# Patient Record
Sex: FEMALE | Race: WHITE | Employment: OTHER | URBAN - METROPOLITAN AREA
[De-identification: names, ages, dates, MRNs, and addresses within clinical notes are randomized per-mention and may not be internally consistent; named-entity substitution may affect disease eponyms.]

---

## 2017-05-10 ENCOUNTER — OFFICE VISIT (OUTPATIENT)
Dept: FAMILY MEDICINE CLINIC | Facility: CLINIC | Age: 77
End: 2017-05-10

## 2017-05-10 VITALS
OXYGEN SATURATION: 98 % | WEIGHT: 200 LBS | SYSTOLIC BLOOD PRESSURE: 138 MMHG | HEART RATE: 92 BPM | RESPIRATION RATE: 20 BRPM | TEMPERATURE: 98 F | DIASTOLIC BLOOD PRESSURE: 78 MMHG | BODY MASS INDEX: 33.32 KG/M2 | HEIGHT: 65 IN

## 2017-05-10 DIAGNOSIS — J02.9 PHARYNGITIS, UNSPECIFIED ETIOLOGY: Primary | ICD-10-CM

## 2017-05-10 PROCEDURE — 87880 STREP A ASSAY W/OPTIC: CPT | Performed by: NURSE PRACTITIONER

## 2017-05-10 PROCEDURE — 99213 OFFICE O/P EST LOW 20 MIN: CPT | Performed by: NURSE PRACTITIONER

## 2017-05-10 NOTE — PATIENT INSTRUCTIONS
Comfort measures explained and discussed:   · OTC Tylenol/ibuprofen as needed. · Push fluids- warm or cool liquids, whichever is soothing for patient. · Avoid caffeine. · Do not share utensils or drinks with anyone. · Good handwashing.    · Get ple

## 2017-05-10 NOTE — PROGRESS NOTES
CHIEF COMPLAINT:   Patient presents with:  Sore Throat        HPI:   Pancho Venegas is a 77year old female presents to clinic with complaint of sore throat. Patient has had for 1 days. Symptoms have been waxing and waning since onset.   Patient reports fo normocephalic  EYES: conjunctiva clear, EOM intact  EARS: TM's clear, non-injected, no bulging, retraction, or fluid bilaterally  NOSE: nostrils patent, scant nasal discharge, nasal mucosa pink  THROAT: oral mucosa pink, moist. Posterior pharynx erythemato

## 2017-05-15 RX ORDER — LISINOPRIL AND HYDROCHLOROTHIAZIDE 25; 20 MG/1; MG/1
TABLET ORAL
Qty: 90 TABLET | Refills: 0 | Status: SHIPPED | OUTPATIENT
Start: 2017-05-15 | End: 2017-07-06

## 2017-05-15 NOTE — TELEPHONE ENCOUNTER
Hypertensive Medications  Protocol Criteria:  · Appointment scheduled in the past 6 months or in the next 3 months  · BMP or CMP in the past 12 months  · Creatinine result < 2  Recent Visits       Provider Department Primary Dx    4 months ago Henok Laureano

## 2017-07-06 ENCOUNTER — OFFICE VISIT (OUTPATIENT)
Dept: INTERNAL MEDICINE CLINIC | Facility: CLINIC | Age: 77
End: 2017-07-06

## 2017-07-06 VITALS
HEART RATE: 78 BPM | HEIGHT: 64.5 IN | SYSTOLIC BLOOD PRESSURE: 117 MMHG | BODY MASS INDEX: 35.03 KG/M2 | TEMPERATURE: 98 F | WEIGHT: 207.69 LBS | DIASTOLIC BLOOD PRESSURE: 76 MMHG | RESPIRATION RATE: 16 BRPM

## 2017-07-06 DIAGNOSIS — Z78.0 POSTMENOPAUSAL: ICD-10-CM

## 2017-07-06 DIAGNOSIS — Z23 NEED FOR VACCINATION: ICD-10-CM

## 2017-07-06 DIAGNOSIS — Z01.419 ENCOUNTER FOR GYNECOLOGICAL EXAMINATION WITHOUT ABNORMAL FINDING: ICD-10-CM

## 2017-07-06 DIAGNOSIS — Z86.010 HISTORY OF COLON POLYPS: ICD-10-CM

## 2017-07-06 DIAGNOSIS — C50.912 MALIGNANT NEOPLASM OF LEFT FEMALE BREAST, UNSPECIFIED ESTROGEN RECEPTOR STATUS, UNSPECIFIED SITE OF BREAST (HCC): ICD-10-CM

## 2017-07-06 DIAGNOSIS — I10 ESSENTIAL HYPERTENSION: Primary | ICD-10-CM

## 2017-07-06 DIAGNOSIS — Z80.0 FAMILY HISTORY OF COLON CANCER: ICD-10-CM

## 2017-07-06 DIAGNOSIS — N95.1 MENOPAUSAL STATE: ICD-10-CM

## 2017-07-06 DIAGNOSIS — Z00.00 ENCOUNTER FOR ANNUAL HEALTH EXAMINATION: ICD-10-CM

## 2017-07-06 DIAGNOSIS — Z00.00 MEDICARE ANNUAL WELLNESS VISIT, INITIAL: ICD-10-CM

## 2017-07-06 PROCEDURE — 90670 PCV13 VACCINE IM: CPT | Performed by: INTERNAL MEDICINE

## 2017-07-06 PROCEDURE — G0009 ADMIN PNEUMOCOCCAL VACCINE: HCPCS | Performed by: INTERNAL MEDICINE

## 2017-07-06 PROCEDURE — G0101 CA SCREEN;PELVIC/BREAST EXAM: HCPCS | Performed by: INTERNAL MEDICINE

## 2017-07-06 PROCEDURE — G0438 PPPS, INITIAL VISIT: HCPCS | Performed by: INTERNAL MEDICINE

## 2017-07-06 RX ORDER — LISINOPRIL AND HYDROCHLOROTHIAZIDE 25; 20 MG/1; MG/1
1 TABLET ORAL
Qty: 90 TABLET | Refills: 1 | Status: SHIPPED | OUTPATIENT
Start: 2017-07-06 | End: 2018-03-01

## 2017-07-06 NOTE — PATIENT INSTRUCTIONS
Problem List Items Addressed This Visit        Unprioritized    Breast cancer (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 PT 1 cN0 M0.   She was treated with lumpectomy, sentinel node resection, rad stable and well controlled. No chest pain, palpitations, shortness of breath or lower extremity edema. Recheck labs have been ordered.          Relevant Medications    Lisinopril-Hydrochlorothiazide 20-25 MG Oral Tab    Other Relevant Orders    CBC WITH D Postmenopausal        Relevant Orders    XR DEXA BONE DENSITOMETRY (CPT=77080)    XR DEXA BONE DENSITOMETRY (CPT=77080)    Need for vaccination        Relevant Orders    PNEUMOCOCCAL VACC, 13 JI IM         Chandrika Strange's SCREENING SCHEDULE   Tests on thi found for this or any previous visit.  Limited to patients who meet one of the following criteria:   • Men who are 73-68 years old and have smoked more than 100 cigarettes in their lifetime   • Anyone with a family history    Colorectal Cancer Screening  Co Please get this Mammogram regularly   Immunizations      Influenza  Covered Annually No orders found for this or any previous visit.  Please get every year    Pneumococcal 13 (Prevnar)  Covered Once after 65   Orders placed or performed in visit on 07/06/17 Directives.

## 2017-07-06 NOTE — ASSESSMENT & PLAN NOTE
Family history of colon cancer– personal history of multiple colon polyps. Last colonoscopy done in 2013. Due for a follow-up evaluation–advised to follow-up with gastroenterology by November 2017.   Colonoscopy,5 Years due on 11/07/2017

## 2017-07-06 NOTE — ASSESSMENT & PLAN NOTE
Normal exam.  Labs as ordered. Skin check with multiple nevi–referral to dermatology provided. Vision and hearing seem normal at this time. Patient is status post bilateral IOL–done per [facility].   Yearly eye exams have been normal thus far wit

## 2017-07-06 NOTE — ASSESSMENT & PLAN NOTE
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 PT 1 cN0 M0. She was treated with lumpectomy, sentinel node resection, radiation therapy and hormonal therapy for 5 years.   She did have a stress test done in 201

## 2017-07-06 NOTE — ASSESSMENT & PLAN NOTE
Last cta in 2015 as follows  CONCLUSION:     #1 mild partially calcified plaque stenosis in the mid right coronary  artery of less than 30%     #2 the left anterior descending artery tapers distally but revealed no  significant stenosis     #3 moderate con

## 2017-07-06 NOTE — ASSESSMENT & PLAN NOTE
Patient is being monitored per colonoscopies. Currently asymptomatic. Recheck labs have been ordered.

## 2017-07-06 NOTE — PROGRESS NOTES
HPI:   French Goode is a 69year old female who presents for a Medicare Initial Annual Wellness visit (Once after 12 month Medicare anniversary) .       Her last annual assessment has been over 1 year: Annual Physical due on 05/26/1942         Patient Ca Unspecified essential hypertension. She  has a past surgical history that includes other surgical history; other surgical history; other surgical history; lumpectomy left (); radiation left (); and .     Her family history includes Breast Can strain to understand conversations:  No   I have to worry about missing the telephone ring or doorbell:  No I have trouble hearing conversations in a noisy background such as a crowded room or restaurant:  No   I get confused about where sounds come from: gallop   Abdomen:   Soft, non-tender, bowel sounds active all four quadrants,  no masses, no organomegaly   Pelvic: Deferred   Extremities: Extremities normal, atraumatic, no cyanosis or edema   Pulses: 2+ and symmetric   Skin: Skin color, texture, turgor EXAM    Postmenopausal  -     XR DEXA BONE DENSITOMETRY (CPT=77080); Future  -     XR DEXA BONE DENSITOMETRY (CPT=77080);  Future    Need for vaccination  -     PNEUMOCOCCAL VACC, 13 JI IM    Menopausal state Medicare annual wellness visit, initial    O aortic root.     Dictated by (CST):  Lb Becerra MD on 4/09/2015 at 11:41    Currently on lisinopril hydrochlorothiazide 20/25 1 tablet once daily. Blood pressure 117/76, pulse 78, temperature 97.9 °F (36.6 °C), temperature source Oral, resp.  rate masses. Colonoscopy,5 Years due on 11/07/2017    Immunization History   Administered Date(s) Administered   • TDAP 01/01/2010   • Zoster (Shingles) 08/01/2015     Prevnar 13 provided today. Menopausal state     DEXA scan has been ordered. Functional Ability     Bathing or Showering: Able without help    Toileting: Able without help    Dressing: Able without help    Eating: Able without help    Driving: Able without help    Preparing your meals: Able without help    Managing money/bills: ENTRY)    Recall \"Tree\": Correct (LATE ENTRY)       This section provided for quick review of chart, separate sheet to patient  [address] Internal Lab or Procedure External Lab or Procedure   Diabetes Screening 15 (Prevnar)  Covered Once after 65 No vaccine history found Please get once after your 65th birthday    Pneumococcal 23 (Pneumovax)  Covered Once after 65 No vaccine history found Please get once after your 65th birthday    Hepatitis B for Moderate/High R Spirometry Testing Annually Spirometry date:  No flowsheet data found.          Template: [facility] MEDICARE ANNUAL ASSESSMENT FEMALE [94581]

## 2017-07-07 ENCOUNTER — HOSPITAL ENCOUNTER (OUTPATIENT)
Dept: BONE DENSITY | Facility: HOSPITAL | Age: 77
Discharge: HOME | End: 2017-07-07
Attending: INTERNAL MEDICINE
Payer: MEDICARE

## 2017-07-07 ENCOUNTER — LAB ENCOUNTER (OUTPATIENT)
Dept: LAB | Facility: HOSPITAL | Age: 77
End: 2017-07-07
Attending: INTERNAL MEDICINE
Payer: MEDICARE

## 2017-07-07 DIAGNOSIS — Z78.0 POSTMENOPAUSAL: ICD-10-CM

## 2017-07-07 DIAGNOSIS — I10 ESSENTIAL HYPERTENSION: ICD-10-CM

## 2017-07-07 LAB
ALBUMIN SERPL BCP-MCNC: 3.7 G/DL (ref 3.5–4.8)
ALBUMIN/GLOB SERPL: 1.3 {RATIO} (ref 1–2)
ALP SERPL-CCNC: 60 U/L (ref 32–100)
ALT SERPL-CCNC: 18 U/L (ref 14–54)
ANION GAP SERPL CALC-SCNC: 9 MMOL/L (ref 0–18)
AST SERPL-CCNC: 18 U/L (ref 15–41)
BASOPHILS # BLD: 0 K/UL (ref 0–0.2)
BASOPHILS NFR BLD: 1 %
BILIRUB SERPL-MCNC: 0.8 MG/DL (ref 0.3–1.2)
BILIRUB UR QL: NEGATIVE
BUN SERPL-MCNC: 19 MG/DL (ref 8–20)
BUN/CREAT SERPL: 20.7 (ref 10–20)
CALCIUM SERPL-MCNC: 9.1 MG/DL (ref 8.5–10.5)
CHLORIDE SERPL-SCNC: 101 MMOL/L (ref 95–110)
CHOLEST SERPL-MCNC: 229 MG/DL (ref 110–200)
CO2 SERPL-SCNC: 29 MMOL/L (ref 22–32)
COLOR UR: YELLOW
CREAT SERPL-MCNC: 0.92 MG/DL (ref 0.5–1.5)
CREAT UR-MCNC: 146.9 MG/DL
EOSINOPHIL # BLD: 0.1 K/UL (ref 0–0.7)
EOSINOPHIL NFR BLD: 2 %
ERYTHROCYTE [DISTWIDTH] IN BLOOD BY AUTOMATED COUNT: 13.4 % (ref 11–15)
GLOBULIN PLAS-MCNC: 2.8 G/DL (ref 2.5–3.7)
GLUCOSE SERPL-MCNC: 84 MG/DL (ref 70–99)
GLUCOSE UR-MCNC: NEGATIVE MG/DL
HCT VFR BLD AUTO: 40.2 % (ref 35–48)
HDLC SERPL-MCNC: 50 MG/DL
HGB BLD-MCNC: 13.4 G/DL (ref 12–16)
HYALINE CASTS #/AREA URNS AUTO: 1 /LPF
KETONES UR-MCNC: NEGATIVE MG/DL
LDLC SERPL CALC-MCNC: 148 MG/DL (ref 0–99)
LYMPHOCYTES # BLD: 1.1 K/UL (ref 1–4)
LYMPHOCYTES NFR BLD: 15 %
MCH RBC QN AUTO: 30 PG (ref 27–32)
MCHC RBC AUTO-ENTMCNC: 33.4 G/DL (ref 32–37)
MCV RBC AUTO: 89.8 FL (ref 80–100)
MICROALBUMIN UR-MCNC: 0 MG/DL (ref 0–1.8)
MICROALBUMIN/CREAT UR: 0 MG/G{CREAT} (ref 0–20)
MONOCYTES # BLD: 0.5 K/UL (ref 0–1)
MONOCYTES NFR BLD: 6 %
NEUTROPHILS # BLD AUTO: 6 K/UL (ref 1.8–7.7)
NEUTROPHILS NFR BLD: 77 %
NITRITE UR QL STRIP.AUTO: NEGATIVE
NONHDLC SERPL-MCNC: 179 MG/DL
OSMOLALITY UR CALC.SUM OF ELEC: 289 MOSM/KG (ref 275–295)
PH UR: 7 [PH] (ref 5–8)
PLATELET # BLD AUTO: 231 K/UL (ref 140–400)
PMV BLD AUTO: 9.7 FL (ref 7.4–10.3)
POTASSIUM SERPL-SCNC: 3.6 MMOL/L (ref 3.3–5.1)
PROT SERPL-MCNC: 6.5 G/DL (ref 5.9–8.4)
PROT UR-MCNC: NEGATIVE MG/DL
RBC # BLD AUTO: 4.47 M/UL (ref 3.7–5.4)
RBC #/AREA URNS AUTO: 4 /HPF
SODIUM SERPL-SCNC: 139 MMOL/L (ref 136–144)
SP GR UR STRIP: 1.02 (ref 1–1.03)
TRIGL SERPL-MCNC: 154 MG/DL (ref 1–149)
TSH SERPL-ACNC: 3.85 UIU/ML (ref 0.45–5.33)
UROBILINOGEN UR STRIP-ACNC: <2
VIT C UR-MCNC: NEGATIVE MG/DL
WBC # BLD AUTO: 7.8 K/UL (ref 4–11)
WBC #/AREA URNS AUTO: 5 /HPF

## 2017-07-07 PROCEDURE — 77080 DXA BONE DENSITY AXIAL: CPT | Performed by: INTERNAL MEDICINE

## 2017-07-07 PROCEDURE — 80061 LIPID PANEL: CPT

## 2017-07-07 PROCEDURE — 82570 ASSAY OF URINE CREATININE: CPT

## 2017-07-07 PROCEDURE — 84443 ASSAY THYROID STIM HORMONE: CPT

## 2017-07-07 PROCEDURE — 85025 COMPLETE CBC W/AUTO DIFF WBC: CPT

## 2017-07-07 PROCEDURE — 80053 COMPREHEN METABOLIC PANEL: CPT

## 2017-07-07 PROCEDURE — 36415 COLL VENOUS BLD VENIPUNCTURE: CPT

## 2017-07-07 PROCEDURE — 82043 UR ALBUMIN QUANTITATIVE: CPT

## 2017-07-07 PROCEDURE — 81001 URINALYSIS AUTO W/SCOPE: CPT

## 2017-07-27 ENCOUNTER — TELEPHONE (OUTPATIENT)
Dept: INTERNAL MEDICINE CLINIC | Facility: CLINIC | Age: 77
End: 2017-07-27

## 2017-07-27 NOTE — TELEPHONE ENCOUNTER
Pt said Hossein Dong was to mail POA kit to her- did not receive  Discussed at last appt  Requesting to home address (confirmed)

## 2017-07-27 NOTE — TELEPHONE ENCOUNTER
Pt was called. Results below relayed, w/understanding.     Notes Recorded by Cheryl Cheney MD on 7/9/2017 at 8:06 PM CDT  The bone density scan looks normal.

## 2017-07-27 NOTE — TELEPHONE ENCOUNTER
\"A Personal Decision\" booklet m/o to pt as well. Pt was called and notified. Address on file verified w/pt.

## 2017-10-04 ENCOUNTER — TELEPHONE (OUTPATIENT)
Dept: GASTROENTEROLOGY | Facility: CLINIC | Age: 77
End: 2017-10-04

## 2017-10-04 DIAGNOSIS — Z86.010 HX OF COLONIC POLYPS: Primary | ICD-10-CM

## 2017-10-04 NOTE — TELEPHONE ENCOUNTER
The patient's chart has been reviewed. Okay to schedule pt for 5 year colonoscopy recall r/t history of colon polyps with Dr. Baldo Stephen. Advise IV [place] sedation with split dose Colyte (eRx) preparation.    May substitute Colyte/TriLyte as Belinda Penningtonmin

## 2017-10-04 NOTE — TELEPHONE ENCOUNTER
Pt called to schedule repeat CLN. Pt is aware of at least 72hr call back time. Please call [phone].  Pt states it is ok to leave a detailed message thank you

## 2017-10-04 NOTE — TELEPHONE ENCOUNTER
Last Procedure:  11/7/2012  Last Diagnosis:  family history of colon cancer and history of serrated adenoma  Recalled for (years): 5 years  Sedation used previously:  IV sedation  Last Prep Used (if known):  Miralax prep  Quality of prep (if known):  Prep

## 2017-10-05 ENCOUNTER — TELEPHONE (OUTPATIENT)
Dept: INTERNAL MEDICINE CLINIC | Facility: CLINIC | Age: 77
End: 2017-10-05

## 2017-10-05 DIAGNOSIS — N63.10 BREAST MASS, RIGHT: ICD-10-CM

## 2017-10-05 DIAGNOSIS — R92.2 DENSE BREAST TISSUE: Primary | ICD-10-CM

## 2017-10-05 DIAGNOSIS — Z98.890 HISTORY OF LUMPECTOMY OF LEFT BREAST: ICD-10-CM

## 2017-10-05 NOTE — TELEPHONE ENCOUNTER
Scheduled for:  Colonoscopy - 66400  Provider Name:  Dr. Chloe Bates  Date:  1/2/18  Location:  [facility]  Sedation:  IV  Time:  11:00 am (pt is aware to arrive at 10:00 am)  Prep:  Colyte, Prep instructions were given to pt over the phone, pt verbalized understanding.

## 2017-10-05 NOTE — TELEPHONE ENCOUNTER
TAD - please see message below and advise if ok to generate order for B/L diagnostic mammo w/US instead. (order pend for sign off)    (Last mammo - 11/08/2016 w/recommendation for six month B/L US f/u.  Pt never followed through and is now due for B/L mamm

## 2017-10-05 NOTE — TELEPHONE ENCOUNTER
Selene Fournier from [facility] called in stating pt's US of the breast does not qualify for this pt. Selene Fournier asking if a bilateral diagnostic mammogram with US if needed can be ordered for pt.   Selene Fournier also asking if someone from Dr. Colin Israel office can call pt t

## 2017-10-30 ENCOUNTER — TELEPHONE (OUTPATIENT)
Dept: GASTROENTEROLOGY | Facility: CLINIC | Age: 77
End: 2017-10-30

## 2017-10-30 DIAGNOSIS — Z86.010 HX OF COLONIC POLYPS: Primary | ICD-10-CM

## 2017-10-30 NOTE — TELEPHONE ENCOUNTER
Pt called to reschedule CLN 1/2/18. Please call [phone] thank you. Pt states it is ok to leave a message. Pt states she would like procedure done anytime after 1/15/18 and would prefer mornings.

## 2017-10-31 ENCOUNTER — HOSPITAL ENCOUNTER (OUTPATIENT)
Dept: MAMMOGRAPHY | Facility: HOSPITAL | Age: 77
Discharge: HOME | End: 2017-10-31
Attending: INTERNAL MEDICINE
Payer: MEDICARE

## 2017-10-31 DIAGNOSIS — N63.10 BREAST MASS, RIGHT: ICD-10-CM

## 2017-10-31 DIAGNOSIS — Z98.890 HISTORY OF LUMPECTOMY OF LEFT BREAST: ICD-10-CM

## 2017-10-31 DIAGNOSIS — R92.2 DENSE BREAST TISSUE: ICD-10-CM

## 2017-10-31 PROCEDURE — 77066 DX MAMMO INCL CAD BI: CPT | Performed by: INTERNAL MEDICINE

## 2017-11-01 NOTE — TELEPHONE ENCOUNTER
CBLM to schedule procedure. Please transfer to Terrie Corbett at ext [phone] or [phone] for scheduling. Or please transfer to [place] in GI if unavailable.

## 2017-11-02 NOTE — TELEPHONE ENCOUNTER
Rescheduled for:  Colonoscopy - 44457    Provider Name:  Dr. Jose Elias Abraham  Date:    FROM - 1/2/18                      TO - 1/17/18  Location:  [facility]  Sedation:  IV  Time:    FROM - 11:00 am                   TO - 9:15 am (pt is aware to arrive at 8:15 am)  Prep:  Col

## 2018-01-10 ENCOUNTER — TELEPHONE (OUTPATIENT)
Dept: GASTROENTEROLOGY | Facility: CLINIC | Age: 78
End: 2018-01-10

## 2018-01-10 DIAGNOSIS — Z86.010 PERSONAL HISTORY OF COLONIC POLYPS: Primary | ICD-10-CM

## 2018-01-10 NOTE — TELEPHONE ENCOUNTER
Rescheduled for:  Colonoscopy - 21842    Provider Name:  Dr. Nicky Nolan  Date:   FROM - 1/17/18                         TO - 3/5/18  Location:  [facility]  Sedation:  IV  Time:   FROM - 9:15 am                     TO - 9:15 am (pt is aware to arrive at 8:15 am)  Prep:  C

## 2018-01-15 ENCOUNTER — TELEPHONE (OUTPATIENT)
Dept: GASTROENTEROLOGY | Facility: CLINIC | Age: 78
End: 2018-01-15

## 2018-01-15 NOTE — TELEPHONE ENCOUNTER
I spoke with pt    Her concerns are related to the split dose prep and waking up at 3:15am to drink 2nd half of prep. She states she rescheduled and does not recall this time being as earlier previously.   I advised reasoning was because she was previously

## 2018-01-15 NOTE — TELEPHONE ENCOUNTER
Spoke with pt and reviewed new revised prep instructions.   She is in agreement with this plan and wrote down new instructions to follow

## 2018-03-01 RX ORDER — LISINOPRIL AND HYDROCHLOROTHIAZIDE 25; 20 MG/1; MG/1
1 TABLET ORAL
Qty: 90 TABLET | Refills: 1 | Status: SHIPPED | OUTPATIENT
Start: 2018-03-01 | End: 2018-03-02

## 2018-03-01 NOTE — TELEPHONE ENCOUNTER
pt is requesting a refill for LISINOP/HCTZ TAB send to [facility] and 1 week supply sent to local [facility] pharmacy in [facility] on file. Pt asking for it to be done asap please. She is running out.   Hypertensive Medications  Protocol Criteria:  · Appointment s

## 2018-03-02 ENCOUNTER — TELEPHONE (OUTPATIENT)
Dept: OTHER | Age: 78
End: 2018-03-02

## 2018-03-02 RX ORDER — LISINOPRIL AND HYDROCHLOROTHIAZIDE 25; 20 MG/1; MG/1
1 TABLET ORAL
Qty: 7 TABLET | Refills: 0 | Status: SHIPPED | OUTPATIENT
Start: 2018-03-02 | End: 2018-09-06

## 2018-03-05 ENCOUNTER — HOSPITAL ENCOUNTER (OUTPATIENT)
Facility: HOSPITAL | Age: 78
Setting detail: HOSPITAL OUTPATIENT SURGERY
Discharge: HOME | End: 2018-03-05
Attending: INTERNAL MEDICINE | Admitting: INTERNAL MEDICINE
Payer: MEDICARE

## 2018-03-05 ENCOUNTER — SURGERY (OUTPATIENT)
Age: 78
End: 2018-03-05

## 2018-03-05 VITALS
WEIGHT: 210 LBS | HEIGHT: 65 IN | OXYGEN SATURATION: 98 % | DIASTOLIC BLOOD PRESSURE: 90 MMHG | RESPIRATION RATE: 14 BRPM | HEART RATE: 72 BPM | SYSTOLIC BLOOD PRESSURE: 133 MMHG | BODY MASS INDEX: 34.99 KG/M2

## 2018-03-05 DIAGNOSIS — Z86.010 HX OF COLONIC POLYPS: ICD-10-CM

## 2018-03-05 PROCEDURE — 0DBK8ZX EXCISION OF ASCENDING COLON, VIA NATURAL OR ARTIFICIAL OPENING ENDOSCOPIC, DIAGNOSTIC: ICD-10-PCS | Performed by: INTERNAL MEDICINE

## 2018-03-05 PROCEDURE — 0DBN8ZX EXCISION OF SIGMOID COLON, VIA NATURAL OR ARTIFICIAL OPENING ENDOSCOPIC, DIAGNOSTIC: ICD-10-PCS | Performed by: INTERNAL MEDICINE

## 2018-03-05 PROCEDURE — 0DBL8ZX EXCISION OF TRANSVERSE COLON, VIA NATURAL OR ARTIFICIAL OPENING ENDOSCOPIC, DIAGNOSTIC: ICD-10-PCS | Performed by: INTERNAL MEDICINE

## 2018-03-05 PROCEDURE — 45385 COLONOSCOPY W/LESION REMOVAL: CPT | Performed by: INTERNAL MEDICINE

## 2018-03-05 PROCEDURE — G0500 MOD SEDAT ENDO SERVICE >5YRS: HCPCS | Performed by: INTERNAL MEDICINE

## 2018-03-05 RX ORDER — MIDAZOLAM HYDROCHLORIDE 1 MG/ML
1 INJECTION INTRAMUSCULAR; INTRAVENOUS EVERY 5 MIN PRN
Status: DISCONTINUED | OUTPATIENT
Start: 2018-03-05 | End: 2018-03-05

## 2018-03-05 RX ORDER — SODIUM CHLORIDE, SODIUM LACTATE, POTASSIUM CHLORIDE, CALCIUM CHLORIDE 600; 310; 30; 20 MG/100ML; MG/100ML; MG/100ML; MG/100ML
INJECTION, SOLUTION INTRAVENOUS CONTINUOUS
Status: DISCONTINUED | OUTPATIENT
Start: 2018-03-05 | End: 2018-03-05

## 2018-03-05 RX ORDER — MIDAZOLAM HYDROCHLORIDE 1 MG/ML
INJECTION INTRAMUSCULAR; INTRAVENOUS
Status: DISCONTINUED | OUTPATIENT
Start: 2018-03-05 | End: 2018-03-05

## 2018-03-05 RX ORDER — SODIUM CHLORIDE 0.9 % (FLUSH) 0.9 %
10 SYRINGE (ML) INJECTION AS NEEDED
Status: DISCONTINUED | OUTPATIENT
Start: 2018-03-05 | End: 2018-03-05

## 2018-03-05 NOTE — OPERATIVE REPORT
[facility] Endoscopy Report      Date of Procedure:  03/05/18      Preoperative Diagnosis:  1. Personal history of adenomatous colon polyps  2. Family history of colon cancer  3.   Colorectal cancer screening      Postoperative Diagnosis: removed as follows:    1. In the distal ascending/proximal transverse colon there was a 4 mm sessile polyp which was cold snare excised and retrieved via suction.   2.  In the mid transverse colon there was a 6 mm sessile polyp which was cold snare excised

## 2018-03-05 NOTE — OPERATIVE REPORT
History & Physical Examination    Patient Name: Cassandra Funez  MRN: [id number]  CSN: [id number]  YOB: 1940    Diagnosis: Personal history of adenomatous polyps, family history of colon cancer and colorectal cancer screening        Prescriptio SURGICAL HISTORY      Comment: Mastoidectomy  2007: RADIATION LEFT  Family History   Problem Relation Age of Onset   • Cancer Father      Cancer-brain   • Other[other] [OTHER] Mother    • Cancer Sister      Cancer-colon   • Breast Cancer Self 66     lt ana

## 2018-03-05 NOTE — H&P
History & Physical Examination    Patient Name: Rosanne Bangura  MRN: [id number]  CSN: [id number]  YOB: 1940    Diagnosis: Personal history of adenomatous polyps, family history of colon cancer and colorectal cancer screening        Prescriptio SURGICAL HISTORY      Comment: Mastoidectomy  2007: RADIATION LEFT  Family History   Problem Relation Age of Onset   • Cancer Father      Cancer-brain   • Other[other] [OTHER] Mother    • Cancer Sister      Cancer-colon   • Breast Cancer Self 66     lt ana

## 2018-03-12 ENCOUNTER — TELEPHONE (OUTPATIENT)
Dept: GASTROENTEROLOGY | Facility: CLINIC | Age: 78
End: 2018-03-12

## 2018-03-12 NOTE — TELEPHONE ENCOUNTER
----- Message from Jan Espinal MD sent at 3/10/2018  1:59 PM CST -----  I spoke to the patient. She is feeling well. She had #4 subcentimeter adenomatous polyps removed. I have discussed the significance.   Uncomplicated diverticulosis was prese

## 2018-04-10 ENCOUNTER — OFFICE VISIT (OUTPATIENT)
Dept: INTERNAL MEDICINE CLINIC | Facility: CLINIC | Age: 78
End: 2018-04-10

## 2018-04-10 VITALS
RESPIRATION RATE: 16 BRPM | DIASTOLIC BLOOD PRESSURE: 89 MMHG | WEIGHT: 215 LBS | HEIGHT: 64.5 IN | SYSTOLIC BLOOD PRESSURE: 157 MMHG | BODY MASS INDEX: 36.26 KG/M2 | HEART RATE: 80 BPM

## 2018-04-10 DIAGNOSIS — I10 ESSENTIAL HYPERTENSION: Primary | ICD-10-CM

## 2018-04-10 DIAGNOSIS — E78.2 MIXED HYPERLIPIDEMIA: ICD-10-CM

## 2018-04-10 PROCEDURE — 99214 OFFICE O/P EST MOD 30 MIN: CPT | Performed by: INTERNAL MEDICINE

## 2018-04-10 PROCEDURE — G0463 HOSPITAL OUTPT CLINIC VISIT: HCPCS | Performed by: INTERNAL MEDICINE

## 2018-04-10 NOTE — ASSESSMENT & PLAN NOTE
Blood pressure 157/89, pulse 80, resp. rate 16, height 5' 4.5\" (1.638 m), weight 215 lb (97.5 kg), not currently breastfeeding. Blood pressure remains quite elevated.   She has been under considerable amount of stress at this time which most likely is a c

## 2018-04-10 NOTE — ASSESSMENT & PLAN NOTE
Last labs completed in July 2017–elevated triglycerides as well as LDL cholesterol. Patient has been on diet control alone. She is due for recheck labs. Did have a CT angiogram in 2016 which did show about 30% plaque–nonobstructive in the LAD.   With zena

## 2018-04-10 NOTE — PROGRESS NOTES
HPI:    Patient ID: Anupama Mcintyre is a 69year old female.     Notes Recorded by Tyesha Alvarez MD on 3/10/2018 at 1:59 PM CST  I spoke to the patient. Kenton Gomez is feeling well.  She had #4 subcentimeter adenomatous polyps removed.  I have discussed the Allergic/Immunologic: Negative. Neurological: Negative. Hematological: Negative. Psychiatric/Behavioral: Negative.                Current Outpatient Prescriptions:  Lisinopril-Hydrochlorothiazide 20-25 MG Oral Tab Take 1 tablet by mouth once deo range of motion. Lymphadenopathy:     She has no cervical adenopathy. Neurological: She is alert and oriented to person, place, and time. She has normal reflexes. No cranial nerve deficit. She exhibits normal muscle tone.  Coordination normal.   Skin: N Random Urine [E]      Urinalysis, Routine [E]    Meds This Visit:  No prescriptions requested or ordered in this encounter    Imaging & Referrals:  None       #[id number]

## 2018-04-10 NOTE — PATIENT INSTRUCTIONS
Problem List Items Addressed This Visit        Unprioritized    Hypertension - Primary     Blood pressure 157/89, pulse 80, resp. rate 16, height 5' 4.5\" (1.638 m), weight 215 lb (97.5 kg), not currently breastfeeding.   Blood pressure remains quite elevat

## 2018-08-27 RX ORDER — LISINOPRIL AND HYDROCHLOROTHIAZIDE 25; 20 MG/1; MG/1
TABLET ORAL
Qty: 90 TABLET | Refills: 1 | Status: SHIPPED | OUTPATIENT
Start: 2018-08-27 | End: 2019-05-20

## 2018-09-06 ENCOUNTER — OFFICE VISIT (OUTPATIENT)
Dept: INTERNAL MEDICINE CLINIC | Facility: CLINIC | Age: 78
End: 2018-09-06
Payer: MEDICARE

## 2018-09-06 VITALS
BODY MASS INDEX: 35.73 KG/M2 | OXYGEN SATURATION: 97 % | HEIGHT: 64.5 IN | WEIGHT: 211.88 LBS | DIASTOLIC BLOOD PRESSURE: 80 MMHG | RESPIRATION RATE: 20 BRPM | HEART RATE: 77 BPM | TEMPERATURE: 98 F | SYSTOLIC BLOOD PRESSURE: 131 MMHG

## 2018-09-06 DIAGNOSIS — Z12.31 VISIT FOR SCREENING MAMMOGRAM: ICD-10-CM

## 2018-09-06 DIAGNOSIS — I10 ESSENTIAL HYPERTENSION: Primary | ICD-10-CM

## 2018-09-06 DIAGNOSIS — E78.2 MIXED HYPERLIPIDEMIA: ICD-10-CM

## 2018-09-06 PROCEDURE — G0463 HOSPITAL OUTPT CLINIC VISIT: HCPCS | Performed by: INTERNAL MEDICINE

## 2018-09-06 PROCEDURE — 99214 OFFICE O/P EST MOD 30 MIN: CPT | Performed by: INTERNAL MEDICINE

## 2018-09-06 NOTE — ASSESSMENT & PLAN NOTE
Blood pressure 131/80, pulse 77, temperature 98 °F (36.7 °C), temperature source Oral, resp. rate 20, height 5' 4.5\" (1.638 m), weight 211 lb 14.4 oz (96.1 kg), SpO2 97 %, not currently breastfeeding. Stable blood pressure, well controlled at this time.

## 2018-09-06 NOTE — PATIENT INSTRUCTIONS
Problem List Items Addressed This Visit        Unprioritized    Hypertension - Primary     Blood pressure 131/80, pulse 77, temperature 98 °F (36.7 °C), temperature source Oral, resp.  rate 20, height 5' 4.5\" (1.638 m), weight 211 lb 14.4 oz (96.1 kg), SpO

## 2018-09-06 NOTE — ASSESSMENT & PLAN NOTE
History of hyperlipidemia and has been on diet control alone. She does not want to start statins. She is due for labs–these have been ordered and are on the computer. She is encouraged to have these done prior to the next office visit fasting.

## 2018-09-06 NOTE — PROGRESS NOTES
HPI:    Patient ID: Won Reyez is a 69year old female. Labs ordered in April have not been completed. Last labs completed in July 2017 did show borderline elevation in the LDL cholesterol–at 148.   She did have about 30% nonobstructive plaque in the Review of Systems   Constitutional: Negative. Negative for malaise/fatigue. HENT: Negative. Eyes: Negative. Respiratory: Negative. Negative for shortness of breath. Cardiovascular: Negative. Negative for palpitations, orthopnea and PND. thyromegaly present. Cardiovascular: Normal rate, regular rhythm, normal heart sounds and intact distal pulses. No murmur heard. Pulmonary/Chest: Effort normal and breath sounds normal. She has no wheezes. She has no rales.  She exhibits no tenderness ([id number])          Return in about 2 months (around 11/6/2018), or if symptoms worsen or fail to improve. PT UNDERSTANDS AND AGREES TO FOLLOW DIRECTIONS AND ADVICE    No orders of the defined types were placed in this encounter.       Meds This Visit:

## 2018-09-18 ENCOUNTER — TELEPHONE (OUTPATIENT)
Dept: INTERNAL MEDICINE CLINIC | Facility: CLINIC | Age: 78
End: 2018-09-18

## 2018-09-18 ENCOUNTER — LAB ENCOUNTER (OUTPATIENT)
Dept: LAB | Facility: HOSPITAL | Age: 78
End: 2018-09-18
Attending: INTERNAL MEDICINE
Payer: MEDICARE

## 2018-09-18 DIAGNOSIS — E78.2 MIXED HYPERLIPIDEMIA: ICD-10-CM

## 2018-09-18 DIAGNOSIS — I10 ESSENTIAL HYPERTENSION: ICD-10-CM

## 2018-09-18 LAB
ALBUMIN SERPL BCP-MCNC: 3.8 G/DL (ref 3.5–4.8)
ALBUMIN/GLOB SERPL: 1.2 {RATIO} (ref 1–2)
ALP SERPL-CCNC: 55 U/L (ref 32–100)
ALT SERPL-CCNC: 19 U/L (ref 14–54)
ANION GAP SERPL CALC-SCNC: 7 MMOL/L (ref 0–18)
AST SERPL-CCNC: 22 U/L (ref 15–41)
BASOPHILS # BLD: 0.1 K/UL (ref 0–0.2)
BASOPHILS NFR BLD: 1 %
BILIRUB SERPL-MCNC: 0.7 MG/DL (ref 0.3–1.2)
BILIRUB UR QL: NEGATIVE
BUN SERPL-MCNC: 19 MG/DL (ref 8–20)
BUN/CREAT SERPL: 21.8 (ref 10–20)
CALCIUM SERPL-MCNC: 9.6 MG/DL (ref 8.5–10.5)
CHLORIDE SERPL-SCNC: 108 MMOL/L (ref 95–110)
CHOLEST SERPL-MCNC: 241 MG/DL (ref 110–200)
CO2 SERPL-SCNC: 26 MMOL/L (ref 22–32)
COLOR UR: YELLOW
CREAT SERPL-MCNC: 0.87 MG/DL (ref 0.5–1.5)
CREAT UR-MCNC: 250.8 MG/DL
EOSINOPHIL # BLD: 0.2 K/UL (ref 0–0.7)
EOSINOPHIL NFR BLD: 3 %
ERYTHROCYTE [DISTWIDTH] IN BLOOD BY AUTOMATED COUNT: 13.3 % (ref 11–15)
GLOBULIN PLAS-MCNC: 3.1 G/DL (ref 2.5–3.7)
GLUCOSE SERPL-MCNC: 94 MG/DL (ref 70–99)
GLUCOSE UR-MCNC: NEGATIVE MG/DL
HCT VFR BLD AUTO: 42.7 % (ref 35–48)
HDLC SERPL-MCNC: 57 MG/DL
HGB BLD-MCNC: 14.1 G/DL (ref 12–16)
HYALINE CASTS #/AREA URNS AUTO: 3 /LPF
KETONES UR-MCNC: NEGATIVE MG/DL
LDLC SERPL CALC-MCNC: 152 MG/DL (ref 0–99)
LYMPHOCYTES # BLD: 2.3 K/UL (ref 1–4)
LYMPHOCYTES NFR BLD: 37 %
MCH RBC QN AUTO: 29.9 PG (ref 27–32)
MCHC RBC AUTO-ENTMCNC: 33 G/DL (ref 32–37)
MCV RBC AUTO: 90.7 FL (ref 80–100)
MICROALBUMIN UR-MCNC: 3.1 MG/DL (ref 0–1.8)
MICROALBUMIN/CREAT UR: 12.4 MG/G{CREAT} (ref 0–20)
MONOCYTES # BLD: 0.4 K/UL (ref 0–1)
MONOCYTES NFR BLD: 7 %
NEUTROPHILS # BLD AUTO: 3.3 K/UL (ref 1.8–7.7)
NEUTROPHILS NFR BLD: 53 %
NITRITE UR QL STRIP.AUTO: NEGATIVE
NONHDLC SERPL-MCNC: 184 MG/DL
OSMOLALITY UR CALC.SUM OF ELEC: 294 MOSM/KG (ref 275–295)
PATIENT FASTING: YES
PH UR: 5 [PH] (ref 5–8)
PLATELET # BLD AUTO: 256 K/UL (ref 140–400)
PMV BLD AUTO: 9.2 FL (ref 7.4–10.3)
POTASSIUM SERPL-SCNC: 3.9 MMOL/L (ref 3.3–5.1)
PROT SERPL-MCNC: 6.9 G/DL (ref 5.9–8.4)
PROT UR-MCNC: NEGATIVE MG/DL
RBC # BLD AUTO: 4.71 M/UL (ref 3.7–5.4)
RBC #/AREA URNS AUTO: 9 /HPF
SODIUM SERPL-SCNC: 141 MMOL/L (ref 136–144)
SP GR UR STRIP: 1.02 (ref 1–1.03)
TRIGL SERPL-MCNC: 160 MG/DL (ref 1–149)
TSH SERPL-ACNC: 4.04 UIU/ML (ref 0.45–5.33)
UROBILINOGEN UR STRIP-ACNC: <2
VIT C UR-MCNC: 40 MG/DL
WBC # BLD AUTO: 6.3 K/UL (ref 4–11)
WBC #/AREA URNS AUTO: 28 /HPF

## 2018-09-18 PROCEDURE — 85025 COMPLETE CBC W/AUTO DIFF WBC: CPT

## 2018-09-18 PROCEDURE — 82570 ASSAY OF URINE CREATININE: CPT

## 2018-09-18 PROCEDURE — 82043 UR ALBUMIN QUANTITATIVE: CPT

## 2018-09-18 PROCEDURE — 36415 COLL VENOUS BLD VENIPUNCTURE: CPT

## 2018-09-18 PROCEDURE — 80053 COMPREHEN METABOLIC PANEL: CPT

## 2018-09-18 PROCEDURE — 80061 LIPID PANEL: CPT

## 2018-09-18 PROCEDURE — 81001 URINALYSIS AUTO W/SCOPE: CPT

## 2018-09-18 PROCEDURE — 84443 ASSAY THYROID STIM HORMONE: CPT

## 2018-09-18 NOTE — TELEPHONE ENCOUNTER
Pt called requesting zpack, [place] and would like to have medication on hand .  Please send to      [facility] [place] [address]

## 2018-09-19 RX ORDER — AZITHROMYCIN 250 MG/1
TABLET, FILM COATED ORAL
Qty: 1 PACKAGE | Refills: 0 | Status: SHIPPED | OUTPATIENT
Start: 2018-09-19 | End: 2018-11-16

## 2018-11-01 ENCOUNTER — HOSPITAL ENCOUNTER (OUTPATIENT)
Dept: MAMMOGRAPHY | Facility: HOSPITAL | Age: 78
Discharge: HOME | End: 2018-11-01
Attending: INTERNAL MEDICINE
Payer: MEDICARE

## 2018-11-01 DIAGNOSIS — Z12.31 VISIT FOR SCREENING MAMMOGRAM: ICD-10-CM

## 2018-11-01 PROCEDURE — 77063 BREAST TOMOSYNTHESIS BI: CPT | Performed by: INTERNAL MEDICINE

## 2018-11-01 PROCEDURE — 77067 SCR MAMMO BI INCL CAD: CPT | Performed by: INTERNAL MEDICINE

## 2018-11-06 ENCOUNTER — HOSPITAL ENCOUNTER (OUTPATIENT)
Dept: MAMMOGRAPHY | Facility: HOSPITAL | Age: 78
Discharge: HOME | End: 2018-11-06
Attending: INTERNAL MEDICINE
Payer: MEDICARE

## 2018-11-06 ENCOUNTER — HOSPITAL ENCOUNTER (OUTPATIENT)
Dept: ULTRASOUND IMAGING | Facility: HOSPITAL | Age: 78
Discharge: HOME | End: 2018-11-06
Attending: INTERNAL MEDICINE
Payer: MEDICARE

## 2018-11-06 DIAGNOSIS — R92.8 ABNORMAL MAMMOGRAM: ICD-10-CM

## 2018-11-06 PROCEDURE — 77061 BREAST TOMOSYNTHESIS UNI: CPT | Performed by: INTERNAL MEDICINE

## 2018-11-06 PROCEDURE — 76642 ULTRASOUND BREAST LIMITED: CPT | Performed by: INTERNAL MEDICINE

## 2018-11-06 PROCEDURE — 77065 DX MAMMO INCL CAD UNI: CPT | Performed by: INTERNAL MEDICINE

## 2018-11-16 ENCOUNTER — OFFICE VISIT (OUTPATIENT)
Dept: INTERNAL MEDICINE CLINIC | Facility: CLINIC | Age: 78
End: 2018-11-16
Payer: MEDICARE

## 2018-11-16 VITALS
HEIGHT: 64.5 IN | BODY MASS INDEX: 35.75 KG/M2 | HEART RATE: 79 BPM | DIASTOLIC BLOOD PRESSURE: 80 MMHG | WEIGHT: 212 LBS | RESPIRATION RATE: 16 BRPM | SYSTOLIC BLOOD PRESSURE: 136 MMHG

## 2018-11-16 DIAGNOSIS — R92.8 ABNORMAL MAMMOGRAM OF RIGHT BREAST: ICD-10-CM

## 2018-11-16 DIAGNOSIS — Z00.00 ENCOUNTER FOR ANNUAL HEALTH EXAMINATION: ICD-10-CM

## 2018-11-16 DIAGNOSIS — Z13.39 SCREENING FOR ALCOHOL PROBLEM: ICD-10-CM

## 2018-11-16 DIAGNOSIS — I10 ESSENTIAL HYPERTENSION: ICD-10-CM

## 2018-11-16 DIAGNOSIS — Z80.0 FAMILY HISTORY OF COLON CANCER: ICD-10-CM

## 2018-11-16 DIAGNOSIS — E78.2 MIXED HYPERLIPIDEMIA: ICD-10-CM

## 2018-11-16 DIAGNOSIS — Z23 NEED FOR VACCINATION: ICD-10-CM

## 2018-11-16 DIAGNOSIS — Z85.3 HISTORY OF BREAST CANCER: Primary | ICD-10-CM

## 2018-11-16 DIAGNOSIS — Z00.00 MEDICARE ANNUAL WELLNESS VISIT, SUBSEQUENT: ICD-10-CM

## 2018-11-16 PROCEDURE — G0442 ANNUAL ALCOHOL SCREEN 15 MIN: HCPCS | Performed by: INTERNAL MEDICINE

## 2018-11-16 PROCEDURE — G0439 PPPS, SUBSEQ VISIT: HCPCS | Performed by: INTERNAL MEDICINE

## 2018-11-16 PROCEDURE — 90732 PPSV23 VACC 2 YRS+ SUBQ/IM: CPT | Performed by: INTERNAL MEDICINE

## 2018-11-16 PROCEDURE — 99497 ADVNCD CARE PLAN 30 MIN: CPT | Performed by: INTERNAL MEDICINE

## 2018-11-16 PROCEDURE — G0447 BEHAVIOR COUNSEL OBESITY 15M: HCPCS | Performed by: INTERNAL MEDICINE

## 2018-11-16 PROCEDURE — G0009 ADMIN PNEUMOCOCCAL VACCINE: HCPCS | Performed by: INTERNAL MEDICINE

## 2018-11-16 NOTE — ASSESSMENT & PLAN NOTE
Normal exam.  Labs as ordered. Skin check looks normal excepting for senile purpura. Vision and hearing seem normal at this time. Patient is status post bilateral IOL–done per [facility].   Yearly eye exams have been normal thus far without glauc

## 2018-11-16 NOTE — ASSESSMENT & PLAN NOTE
Colonoscopy due on 03/05/2021  She has been asymptomatic at this time. No blood in stools, black stools or change in bowel habits.

## 2018-11-16 NOTE — PATIENT INSTRUCTIONS
Problem List Items Addressed This Visit        Unprioritized    Body mass index (BMI) of 35.0-35.9 in adult     Strict diet control to reduce portion sizes as well as starches in the diet.   Reduce fatty foods, fried foods, desserts, nuts, fruits and juices degeneration. Breast exam completed– normal to palpation with small palpable masses that are not adherent to the tissue underlying the skin. Mammogram just completed–follow-up requested in 6 months–orders provided. No changes in the nipples.   No dischar SCHEDULE   Tests on this list are recommended by your physician but may not be covered, or covered at this frequency, by your insurer. Please check with your insurance carrier before scheduling to verify coverage.    PREVENTATIVE SERVICES  INDICATIONS AND S Screening  Covered up to Age 76     Colonoscopy Screen   Covered every 10 years- more often if abnormal Colonoscopy due on 03/05/2021 Update Health Maintenance if applicable    Flex Sigmoidoscopy Screen  Covered every 5 years No results found for this or a placed or performed in visit on 07/06/17   • PNEUMOCOCCAL VACC, 13 JI IM    Please get once after your 65th birthday    Pneumococcal 23 (Pneumovax)  Covered Once after 65 Orders placed or performed in visit on 11/16/18   • PNEUMOCOCCAL IMM (PNEUMOVAX)

## 2018-11-16 NOTE — ASSESSMENT & PLAN NOTE
Diagnosed with left lower outer quadrant infiltrating well-differentiated ductal carcinoma– pT1 cN0 M0 in 2006  Treated with lumpectomy, sentinel node dissection, radiation treatment as well as hormonal treatment for 5 years.   She has remained disease-free

## 2018-11-16 NOTE — ASSESSMENT & PLAN NOTE
Strict diet control to reduce portion sizes as well as starches in the diet. Reduce fatty foods, fried foods, desserts, nuts, fruits and juices in the diet. Increase vegetables and lean protein. Exercise for about 15-20 minutes daily.   Follow-up at the

## 2018-11-16 NOTE — ASSESSMENT & PLAN NOTE
Lipid panel shows persistent elevation in the LDL cholesterol. She does not want to start on statins. Advised strict diet control and consider starting on red yeast rice capsules if willing and recheck labs in the next 4-6 months.

## 2018-11-16 NOTE — ASSESSMENT & PLAN NOTE
Blood pressure 136/80, pulse 79, resp. rate 16, height 5' 4.5\" (1.638 m), weight 212 lb (96.2 kg), not currently breastfeeding. Blood pressure remains stable on lisinopril hydrochlorothiazide 20/25 1 tablet daily.   Continue the same dose of medication

## 2018-11-16 NOTE — PROGRESS NOTES
HPI:   Guilford Dubin is a 69year old female who presents for a Medicare Subsequent Annual Wellness visit (Pt already had Initial Annual Wellness).     Her last annual assessment has been over 1 year: Annual Physical due on 07/06/2018         Fall/Risk As Hypertension     History of breast cancer     Menopausal state     Medicare annual wellness visit, subsequent     Mixed hyperlipidemia    Wt Readings from Last 3 Encounters:  11/16/18 : 212 lb (96.2 kg)  09/06/18 : 211 lb 14.4 oz (96.1 kg)  04/10/18 : 215 Cancer (age of onset: 73) in her self; Cancer in her father and sister; Other in her mother. SOCIAL HISTORY:   She  reports that  has never smoked. she has never used smokeless tobacco. She reports that she drinks alcohol.  She reports that she does not u understanding the speech of women and children:  No I have trouble understanding the speaker in a large room such as at a meeting or place of [place]:  Sometimes   Many people I talk to seem to mumble (or don't speak clearly):  Sometimes People get annoyed tenderness or effusion. Lymphadenopathy:     She has no cervical adenopathy. Neurological: She is alert and oriented to person, place, and time. She displays normal reflexes. No cranial nerve deficit, sensory deficit or motor deficit.  Coordination and management of an abnormal stress test–this did not show any significant coronary artery disease secondary to radiation.          Relevant Orders    NARCISA DIAGNOSTIC RIGHT (CPT=77065)    US BREAST RIGHT TARGETED(CPT=76442)    Hypertension     Blood pressure 13 Refuses the flu shot but due for the Pneumovax 23–provided today. Mixed hyperlipidemia     Lipid panel shows persistent elevation in the LDL cholesterol. She does not want to start on statins.   Advised strict diet control and consider starti Timothy Darling MD, 11/16/2018     General Health     In the past six months, have you lost more than 10 pounds without trying?: 2 - No  Has your appetite been poor?: No  How does the patient maintain a good energy level?: Daily Walks;Stretching  How would you saad Maintenance if applicable    Chlamydia  Annually if high risk No results found for: CHLAMYDIA No flowsheet data found.     Screening Mammogram      Mammogram  Annually to 76, then as discussed There are no preventive care reminders to display for this patie data found. Template: [facility] MEDICARE ANNUAL ASSESSMENT FEMALE Jessica Valadez [71790]  James Carbajal was screened today and had CAGE screening score of Total Score: 0 putting her at low risk of alcohol abuse.    James Carbajal is a 69year old fem

## 2019-05-20 RX ORDER — LISINOPRIL AND HYDROCHLOROTHIAZIDE 25; 20 MG/1; MG/1
TABLET ORAL
Qty: 90 TABLET | Refills: 1 | Status: SHIPPED | OUTPATIENT
Start: 2019-05-20 | End: 2019-11-10

## 2019-06-24 ENCOUNTER — LAB ENCOUNTER (OUTPATIENT)
Dept: LAB | Facility: HOSPITAL | Age: 79
End: 2019-06-24
Attending: INTERNAL MEDICINE
Payer: MEDICARE

## 2019-06-24 ENCOUNTER — HOSPITAL ENCOUNTER (OUTPATIENT)
Dept: MAMMOGRAPHY | Facility: HOSPITAL | Age: 79
Discharge: HOME | End: 2019-06-24
Attending: INTERNAL MEDICINE
Payer: MEDICARE

## 2019-06-24 ENCOUNTER — HOSPITAL ENCOUNTER (OUTPATIENT)
Dept: ULTRASOUND IMAGING | Facility: HOSPITAL | Age: 79
Discharge: HOME | End: 2019-06-24
Attending: INTERNAL MEDICINE
Payer: MEDICARE

## 2019-06-24 DIAGNOSIS — E78.2 MIXED HYPERLIPIDEMIA: ICD-10-CM

## 2019-06-24 DIAGNOSIS — Z85.3 HISTORY OF BREAST CANCER: ICD-10-CM

## 2019-06-24 DIAGNOSIS — R92.8 ABNORMAL MAMMOGRAM OF RIGHT BREAST: ICD-10-CM

## 2019-06-24 DIAGNOSIS — I10 ESSENTIAL HYPERTENSION: ICD-10-CM

## 2019-06-24 PROCEDURE — 80061 LIPID PANEL: CPT

## 2019-06-24 PROCEDURE — 77061 BREAST TOMOSYNTHESIS UNI: CPT | Performed by: INTERNAL MEDICINE

## 2019-06-24 PROCEDURE — 80053 COMPREHEN METABOLIC PANEL: CPT

## 2019-06-24 PROCEDURE — 85025 COMPLETE CBC W/AUTO DIFF WBC: CPT

## 2019-06-24 PROCEDURE — 76642 ULTRASOUND BREAST LIMITED: CPT | Performed by: INTERNAL MEDICINE

## 2019-06-24 PROCEDURE — 36415 COLL VENOUS BLD VENIPUNCTURE: CPT

## 2019-06-24 PROCEDURE — 77065 DX MAMMO INCL CAD UNI: CPT | Performed by: INTERNAL MEDICINE

## 2019-07-02 ENCOUNTER — OFFICE VISIT (OUTPATIENT)
Dept: INTERNAL MEDICINE CLINIC | Facility: CLINIC | Age: 79
End: 2019-07-02
Payer: MEDICARE

## 2019-07-02 VITALS
RESPIRATION RATE: 18 BRPM | HEIGHT: 64.5 IN | DIASTOLIC BLOOD PRESSURE: 82 MMHG | HEART RATE: 76 BPM | BODY MASS INDEX: 36.26 KG/M2 | WEIGHT: 215 LBS | SYSTOLIC BLOOD PRESSURE: 126 MMHG

## 2019-07-02 DIAGNOSIS — I10 ESSENTIAL HYPERTENSION: Primary | ICD-10-CM

## 2019-07-02 DIAGNOSIS — E78.2 MIXED HYPERLIPIDEMIA: ICD-10-CM

## 2019-07-02 DIAGNOSIS — Z78.0 MENOPAUSE: ICD-10-CM

## 2019-07-02 DIAGNOSIS — R92.8 ABNORMALITY OF BOTH BREASTS ON SCREENING MAMMOGRAM: ICD-10-CM

## 2019-07-02 DIAGNOSIS — N95.1 MENOPAUSAL STATE: ICD-10-CM

## 2019-07-02 PROCEDURE — 99214 OFFICE O/P EST MOD 30 MIN: CPT | Performed by: INTERNAL MEDICINE

## 2019-07-02 PROCEDURE — G0463 HOSPITAL OUTPT CLINIC VISIT: HCPCS | Performed by: INTERNAL MEDICINE

## 2019-07-02 NOTE — ASSESSMENT & PLAN NOTE
Panel shows improvement in the LDL cholesterol but significant elevation in the triglycerides.   Patient refuses to start on statins and hence was taking an over-the-counter red yeast rice Cereal.  She is advised to watch the starches, desserts, sugars in h

## 2019-07-02 NOTE — ASSESSMENT & PLAN NOTE
Blood pressure 126/82, pulse 76, resp. rate 18, height 5' 4.5\" (1.638 m), weight 215 lb (97.5 kg), not currently breastfeeding. Patient looks stable, well controlled on lisinopril hydrochlorothiazide 20/20 5-1 tablet daily.   Drink plenty of fluids to

## 2019-07-02 NOTE — PROGRESS NOTES
HPI:    Patient ID: Jaden Morelos is a 67year old female. Written by Cheryl Cheney MD on 6/25/2019  9:40 PM   The cholesterol panel shows elevation in the triglycerides as well as borderline elevation in the LDL cholesterol.    We will need to watch th aggravating her hyperlipidemia include fatty foods. Pertinent negatives include no leg pain or shortness of breath. Current antihyperlipidemic treatment includes exercise and diet change. The current treatment provides no improvement of lipids.  Compliance well-developed and well-nourished. HENT:   Right Ear: External ear normal.   Left Ear: External ear normal.   Nose: Nose normal.   Mouth/Throat: Oropharynx is clear and moist. No oropharyngeal exudate.    Eyes: Pupils are equal, round, and reactive to lig for a DEXA scan with the next mammogram.  Will follow-up after completed.          Relevant Orders    XR DEXA BONE DENSITOMETRY (CPT=77041)    Mixed hyperlipidemia     Panel shows improvement in the LDL cholesterol but significant elevation in the triglycer

## 2019-07-13 ENCOUNTER — APPOINTMENT (OUTPATIENT)
Dept: GENERAL RADIOLOGY | Facility: HOSPITAL | Age: 79
End: 2019-07-13
Attending: EMERGENCY MEDICINE
Payer: MEDICARE

## 2019-07-13 ENCOUNTER — HOSPITAL ENCOUNTER (EMERGENCY)
Facility: HOSPITAL | Age: 79
Discharge: HOME | End: 2019-07-13
Attending: EMERGENCY MEDICINE
Payer: MEDICARE

## 2019-07-13 VITALS
BODY MASS INDEX: 34.15 KG/M2 | RESPIRATION RATE: 20 BRPM | WEIGHT: 200 LBS | HEART RATE: 80 BPM | DIASTOLIC BLOOD PRESSURE: 97 MMHG | TEMPERATURE: 96 F | SYSTOLIC BLOOD PRESSURE: 181 MMHG | HEIGHT: 64 IN | OXYGEN SATURATION: 97 %

## 2019-07-13 DIAGNOSIS — S63.259A DISLOCATION OF FINGER, INITIAL ENCOUNTER: Primary | ICD-10-CM

## 2019-07-13 DIAGNOSIS — T14.8XXA AVULSION FRACTURE: ICD-10-CM

## 2019-07-13 PROCEDURE — 26725 TREAT FINGER FRACTURE EACH: CPT

## 2019-07-13 PROCEDURE — 73140 X-RAY EXAM OF FINGER(S): CPT | Performed by: EMERGENCY MEDICINE

## 2019-07-13 PROCEDURE — 99283 EMERGENCY DEPT VISIT LOW MDM: CPT

## 2019-07-13 NOTE — ED NOTES
DISCHARGE INSTRUCTIONS GIVEN TO PATIENT. VERBALIZED UNDERSTANDING. PATIENT IS A/OX4. NO DISTRESS.   LEFT AMBULATORY STEADY GAIT WITH FRIEND Problem: Goal Outcome Summary  Goal: Goal Outcome Summary  1) pt will be hemodynamically stable  2) pt will tolerate up titration of IV flolan   OT 6C:  Pt. tolerated amb. 1000+ ft. with iv pole and SBA on RA, o2 ranging 92%-97%, HR 80s-103.  Recommend d/c to home with A prn and OP CR; [facility] in [place].

## 2019-07-13 NOTE — ED PROVIDER NOTES
Patient Seen in: [facility] AND [facility] Emergency Department    History   Patient presents with:  Upper Extremity Injury (musculoskeletal)    Stated Complaint: finger injury    HPI    78-year-old female presents for complaint of deformity to her left middle f Current:BP (!) 127/101   Pulse 84   Temp (!) 96.4 °F (35.8 °C) (Temporal)   Resp 16   Ht 162.6 cm (5' 4\")   Wt 90.7 kg   LMP  (LMP Unknown)   SpO2 94%   BMI 34.33 kg/m²         Physical Exam   Constitutional: She is oriented to person, place, and time.  Sh PROCEDURE: XR FINGER(S) (MIN 2 VIEWS), LEFT 3RD (CPT=73140)  COMPARISON: [facility], XR FINGER(S) (MIN 2 VIEWS), LEFT 3RD (CPT=73140), 7/13/2019, 12:19. INDICATIONS: Left 3rd digit post reduction. TECHNIQUE: 3 views were obtained.    Daniela Barragan PROCEDURE: XR FINGER(S) (MIN 2 VIEWS), LEFT 3RD (CPT=73140)  COMPARISON: None. INDICATIONS: Left 3rd digit pain, swelling, and decreased mobility post fall today. TECHNIQUE: 3 views were obtained.    FINDINGS:  BONES: There is dorsal and ulnar dislocation We recommend that you schedule follow up care with a primary care provider within the next three months to obtain basic health screening including reassessment of your blood pressure.     Medications Prescribed:  Current Discharge Medication List

## 2019-11-10 RX ORDER — LISINOPRIL AND HYDROCHLOROTHIAZIDE 25; 20 MG/1; MG/1
TABLET ORAL
Qty: 90 TABLET | Refills: 1 | Status: SHIPPED | OUTPATIENT
Start: 2019-11-10 | End: 2020-05-05

## 2019-11-10 NOTE — TELEPHONE ENCOUNTER
Refill passed per [facility] protocol.   Hypertensive Medications  Protocol Criteria:  · Appointment scheduled in the past 6 months or in the next 3 months  · BMP or CMP in the past 12 months  · Creatinine result < 2  Recent Outpatient Visits

## 2019-12-06 ENCOUNTER — APPOINTMENT (OUTPATIENT)
Dept: LAB | Facility: HOSPITAL | Age: 79
End: 2019-12-06
Attending: INTERNAL MEDICINE
Payer: MEDICARE

## 2019-12-06 ENCOUNTER — HOSPITAL ENCOUNTER (OUTPATIENT)
Dept: BONE DENSITY | Facility: HOSPITAL | Age: 79
Discharge: HOME | End: 2019-12-06
Attending: INTERNAL MEDICINE
Payer: MEDICARE

## 2019-12-06 ENCOUNTER — HOSPITAL ENCOUNTER (OUTPATIENT)
Dept: MAMMOGRAPHY | Facility: HOSPITAL | Age: 79
Discharge: HOME | End: 2019-12-06
Attending: INTERNAL MEDICINE
Payer: MEDICARE

## 2019-12-06 DIAGNOSIS — R92.8 ABNORMALITY OF BOTH BREASTS ON SCREENING MAMMOGRAM: ICD-10-CM

## 2019-12-06 DIAGNOSIS — N95.1 MENOPAUSAL STATE: ICD-10-CM

## 2019-12-06 DIAGNOSIS — E78.2 MIXED HYPERLIPIDEMIA: ICD-10-CM

## 2019-12-06 DIAGNOSIS — Z78.0 MENOPAUSE: ICD-10-CM

## 2019-12-06 DIAGNOSIS — I10 ESSENTIAL HYPERTENSION: ICD-10-CM

## 2019-12-06 PROCEDURE — 80061 LIPID PANEL: CPT

## 2019-12-06 PROCEDURE — 36415 COLL VENOUS BLD VENIPUNCTURE: CPT

## 2019-12-06 PROCEDURE — 77066 DX MAMMO INCL CAD BI: CPT | Performed by: INTERNAL MEDICINE

## 2019-12-06 PROCEDURE — 77080 DXA BONE DENSITY AXIAL: CPT | Performed by: INTERNAL MEDICINE

## 2019-12-06 PROCEDURE — 77062 BREAST TOMOSYNTHESIS BI: CPT | Performed by: INTERNAL MEDICINE

## 2019-12-06 PROCEDURE — 80053 COMPREHEN METABOLIC PANEL: CPT

## 2019-12-10 ENCOUNTER — OFFICE VISIT (OUTPATIENT)
Dept: INTERNAL MEDICINE CLINIC | Facility: CLINIC | Age: 79
End: 2019-12-10
Payer: MEDICARE

## 2019-12-10 VITALS
HEART RATE: 80 BPM | HEIGHT: 64.5 IN | DIASTOLIC BLOOD PRESSURE: 78 MMHG | BODY MASS INDEX: 35.72 KG/M2 | SYSTOLIC BLOOD PRESSURE: 124 MMHG | WEIGHT: 211.81 LBS | RESPIRATION RATE: 20 BRPM | TEMPERATURE: 97 F

## 2019-12-10 DIAGNOSIS — Z80.0 FAMILY HISTORY OF COLON CANCER: ICD-10-CM

## 2019-12-10 DIAGNOSIS — N95.1 MENOPAUSAL STATE: ICD-10-CM

## 2019-12-10 DIAGNOSIS — E78.2 MIXED HYPERLIPIDEMIA: ICD-10-CM

## 2019-12-10 DIAGNOSIS — Z28.21 IMMUNIZATION NOT CARRIED OUT BECAUSE OF PATIENT REFUSAL: ICD-10-CM

## 2019-12-10 DIAGNOSIS — Z00.00 ENCOUNTER FOR ANNUAL HEALTH EXAMINATION: ICD-10-CM

## 2019-12-10 DIAGNOSIS — Z00.00 MEDICARE ANNUAL WELLNESS VISIT, SUBSEQUENT: Primary | ICD-10-CM

## 2019-12-10 DIAGNOSIS — I10 ESSENTIAL HYPERTENSION: ICD-10-CM

## 2019-12-10 DIAGNOSIS — Z86.010 HISTORY OF COLON POLYPS: ICD-10-CM

## 2019-12-10 DIAGNOSIS — Z85.3 HISTORY OF BREAST CANCER: ICD-10-CM

## 2019-12-10 PROCEDURE — G0447 BEHAVIOR COUNSEL OBESITY 15M: HCPCS | Performed by: INTERNAL MEDICINE

## 2019-12-10 PROCEDURE — 99497 ADVNCD CARE PLAN 30 MIN: CPT | Performed by: INTERNAL MEDICINE

## 2019-12-10 PROCEDURE — G0439 PPPS, SUBSEQ VISIT: HCPCS | Performed by: INTERNAL MEDICINE

## 2019-12-10 NOTE — PROGRESS NOTES
HPI:   Danna Jennings is a 67year old female who presents for a Medicare Subsequent Annual Wellness visit (Pt already had Initial Annual Wellness).       Annual Physical due on 12/10/2020        Fall/Risk Assessment   She has been screened for Falls and i Curtis Quiroga MD as PCP - General (Internal Medicine)  Ynes Benites MD as PCP - [facility]    Patient Active Problem List:     Family history of colon cancer     History of colon polyps     Hypertension     History of breast cancer     Menopausal state     Medicare a lumpectomy left (); radiation left (); ; and colonoscopy (N/A, 3/5/2018). Her family history includes Breast Cancer (age of onset: 72) in her self; Cancer in her father and sister; Other in her mother.    SOCIAL HISTORY:   She  reports that s noisy background such as a crowded room or restaurant:  Sometimes   I get confused about where sounds come from:  No I misunderstand some words in a sentence and need to ask people to repeat themselves:  No   I especially have trouble understanding the spe no mass, no nipple discharge, no skin change and no tenderness. Breasts are symmetrical.   Abdominal: Soft. Bowel sounds are normal. She exhibits no distension and no mass. There is no hepatosplenomegaly. There is no tenderness.  There is no rebound and no TRI  MULTIDOSE VIAL (98207) FLU CLINIC 11/16/2018        ASSESSMENT AND OTHER RELEVANT CHRONIC CONDITIONS:   David Merino is a 67year old female who presents for a Medicare Assessment.      Problem List Items Addressed This Visit        Unprioritized    B 07/06/2017      Pneumovax 23          11/16/2018      TDAP                  01/01/2010      Zoster Vaccine Live (Zostavax)                          08/01/2015 11/18/2015    Deferred                Date(s) Deferred    >=3 YRS TRI  MU annual health examination  -     ADVANCE CARE PLANNING FIRST 30 MINS         Diet assessment: good     PLAN:  The patient indicates understanding of these issues and agrees to the plan. Reinforced healthy diet, lifestyle, and exercise. Lab work ordered. (CPT=77080) 12/07/2019    No flowsheet data found. Pap and Pelvic      Pap: Every 3 yrs age 21-68 or Pap+HPV every 5 yrs age 33-67, age 72 and older at high risk There are no preventive care reminders to display for this patient.  Update [facility] Creatinine  Annually Creatinine (mg/dL)   Date Value   12/06/2019 0.88    No flowsheet data found. Drug Serum Conc  Annually No results found for: DIGOXIN, DIG, VALP No flowsheet data found.                Template: [facility]IL[facility] MEDICARE ANNUAL ASSESSMENT FEM

## 2019-12-10 NOTE — ASSESSMENT & PLAN NOTE
Normal exam.  Labs as ordered. Body mass index is 35.79 kg/m². Skin check normal.  No significant abnormal nevi. Breast exam completed–no palpable abnormalities, discharge from the nipples or axillary adenopathy.   Mammogram just completed looked stable

## 2019-12-10 NOTE — PATIENT INSTRUCTIONS
Problem List Items Addressed This Visit        Unprioritized    Body mass index (BMI) of 35.0-35.9 in adult    Relevant Orders    BEHAVIOR  OBESITY 15M    Family history of colon cancer    History of breast cancer    History of colon polyps     [place]air 11/18/2015    Deferred                Date(s) Deferred    >=3 YRS TRI  MULTIDOSE VIAL (70085) FLU CLINIC                          11/16/2018    Recommend shingles vaccine–Shingrix 2 doses, 2-4 months apart.   Patient is advised to discuss this with the phar pounds     Covered at least every 3 years,         Glucose (mg/dL)   Date Value   12/06/2019 75    Medicare covers annually or at 6-month intervals for prediabetic patients        Cardiovascular Disease Screening     Cholesterol, covered every 5 yrs includ related to osteoporosis or estrogen deficiency. Biennial benefit unless patient has history of long-term glucocorticoid use for medical condition    Last Dexa Scan:   XR DEXA BONE DENSITOMETRY (CPT=77080) 12/07/2019    No flowsheet data found.     Recomme by Medicare Part B) No orders found for this or any previous visit. This may be covered with your pharmacy  prescription benefits     Recommended Websites for Advanced Directives    [email or web address]  An informatio

## 2019-12-10 NOTE — ASSESSMENT & PLAN NOTE
Lipid panel shows improvement in the LDL cholesterol but continues to remain above accepted normal of about 100. Currently at 132. Continue on strict diet control. Restrict fatty foods, fried foods, desserts and sugars in the diet.   [facility]

## 2019-12-10 NOTE — ASSESSMENT & PLAN NOTE
Blood pressure 124/78, pulse 80, temperature 97.4 °F (36.3 °C), temperature source Oral, resp. rate 20, height 5' 4.5\" (1.638 m), weight 211 lb 12.8 oz (96.1 kg), not currently breastfeeding.   Stable blood pressure, well controlled on lisinopril hydrochlo

## 2019-12-10 NOTE — ASSESSMENT & PLAN NOTE
Family history of colon cancer, personal history of multiple colon polyps. Last colonoscopy 2017 and is due for a follow-up in 2021.

## 2020-02-17 ENCOUNTER — HOSPITAL ENCOUNTER (OUTPATIENT)
Dept: CT IMAGING | Facility: HOSPITAL | Age: 80
Discharge: HOME | End: 2020-02-17
Attending: INTERNAL MEDICINE

## 2020-02-17 DIAGNOSIS — Z13.9 ENCOUNTER FOR SCREENING: ICD-10-CM

## 2020-02-17 NOTE — IMAGING NOTE
Patient seen at [facility] for [facility]THS:    PRELIMINARY SCORE:  452    BP: 140/78  Cholesterol results from 12/6/2019 reviewed. All results and risk factors reviewed with patient; all questions and concerns addressed. Educational handouts provided.   Instructed ulysses

## 2020-03-12 ENCOUNTER — HOSPITAL ENCOUNTER (OUTPATIENT)
Dept: ULTRASOUND IMAGING | Facility: HOSPITAL | Age: 80
Discharge: HOME | End: 2020-03-12
Attending: INTERNAL MEDICINE

## 2020-03-12 DIAGNOSIS — Z13.9 ENCOUNTER FOR SCREENING: ICD-10-CM

## 2020-05-05 RX ORDER — LISINOPRIL AND HYDROCHLOROTHIAZIDE 25; 20 MG/1; MG/1
TABLET ORAL
Qty: 90 TABLET | Refills: 1 | Status: SHIPPED | OUTPATIENT
Start: 2020-05-05 | End: 2020-10-23

## 2020-07-02 ENCOUNTER — LAB ENCOUNTER (OUTPATIENT)
Dept: LAB | Facility: HOSPITAL | Age: 80
End: 2020-07-02
Attending: INTERNAL MEDICINE
Payer: MEDICARE

## 2020-07-02 DIAGNOSIS — I10 ESSENTIAL HYPERTENSION: ICD-10-CM

## 2020-07-02 LAB
ALBUMIN SERPL-MCNC: 3.6 G/DL (ref 3.4–5)
ALBUMIN/GLOB SERPL: 1 {RATIO} (ref 1–2)
ALP LIVER SERPL-CCNC: 76 U/L (ref 55–142)
ALT SERPL-CCNC: 23 U/L (ref 13–56)
ANION GAP SERPL CALC-SCNC: 3 MMOL/L (ref 0–18)
AST SERPL-CCNC: 9 U/L (ref 15–37)
BASOPHILS # BLD AUTO: 0.07 X10(3) UL (ref 0–0.2)
BASOPHILS NFR BLD AUTO: 0.9 %
BILIRUB SERPL-MCNC: 0.4 MG/DL (ref 0.1–2)
BILIRUB UR QL: NEGATIVE
BUN BLD-MCNC: 19 MG/DL (ref 7–18)
BUN/CREAT SERPL: 19.8 (ref 10–20)
CALCIUM BLD-MCNC: 10 MG/DL (ref 8.5–10.1)
CHLORIDE SERPL-SCNC: 109 MMOL/L (ref 98–112)
CHOLEST SMN-MCNC: 226 MG/DL (ref ?–200)
CO2 SERPL-SCNC: 31 MMOL/L (ref 21–32)
COLOR UR: YELLOW
CREAT BLD-MCNC: 0.96 MG/DL (ref 0.55–1.02)
DEPRECATED RDW RBC AUTO: 43 FL (ref 35.1–46.3)
EOSINOPHIL # BLD AUTO: 0.22 X10(3) UL (ref 0–0.7)
EOSINOPHIL NFR BLD AUTO: 2.8 %
ERYTHROCYTE [DISTWIDTH] IN BLOOD BY AUTOMATED COUNT: 12.9 % (ref 11–15)
GLOBULIN PLAS-MCNC: 3.6 G/DL (ref 2.8–4.4)
GLUCOSE BLD-MCNC: 83 MG/DL (ref 70–99)
GLUCOSE UR-MCNC: NEGATIVE MG/DL
HCT VFR BLD AUTO: 43 % (ref 35–48)
HDLC SERPL-MCNC: 45 MG/DL (ref 40–59)
HGB BLD-MCNC: 14.1 G/DL (ref 12–16)
IMM GRANULOCYTES # BLD AUTO: 0.01 X10(3) UL (ref 0–1)
IMM GRANULOCYTES NFR BLD: 0.1 %
KETONES UR-MCNC: NEGATIVE MG/DL
LDLC SERPL CALC-MCNC: 145 MG/DL (ref ?–100)
LYMPHOCYTES # BLD AUTO: 2.73 X10(3) UL (ref 1–4)
LYMPHOCYTES NFR BLD AUTO: 35.3 %
M PROTEIN MFR SERPL ELPH: 7.2 G/DL (ref 6.4–8.2)
MCH RBC QN AUTO: 30.1 PG (ref 26–34)
MCHC RBC AUTO-ENTMCNC: 32.8 G/DL (ref 31–37)
MCV RBC AUTO: 91.9 FL (ref 80–100)
MONOCYTES # BLD AUTO: 0.53 X10(3) UL (ref 0.1–1)
MONOCYTES NFR BLD AUTO: 6.9 %
NEUTROPHILS # BLD AUTO: 4.17 X10 (3) UL (ref 1.5–7.7)
NEUTROPHILS # BLD AUTO: 4.17 X10(3) UL (ref 1.5–7.7)
NEUTROPHILS NFR BLD AUTO: 54 %
NITRITE UR QL STRIP.AUTO: NEGATIVE
NONHDLC SERPL-MCNC: 181 MG/DL (ref ?–130)
OSMOLALITY SERPL CALC.SUM OF ELEC: 297 MOSM/KG (ref 275–295)
PATIENT FASTING Y/N/NP: YES
PATIENT FASTING Y/N/NP: YES
PH UR: 6 [PH] (ref 5–8)
PLATELET # BLD AUTO: 246 10(3)UL (ref 150–450)
POTASSIUM SERPL-SCNC: 3.9 MMOL/L (ref 3.5–5.1)
PROT UR-MCNC: 30 MG/DL
RBC # BLD AUTO: 4.68 X10(6)UL (ref 3.8–5.3)
RBC #/AREA URNS AUTO: 6 /HPF
SODIUM SERPL-SCNC: 143 MMOL/L (ref 136–145)
SP GR UR STRIP: 1.02 (ref 1–1.03)
TRIGL SERPL-MCNC: 180 MG/DL (ref 30–149)
UROBILINOGEN UR STRIP-ACNC: <2
VLDLC SERPL CALC-MCNC: 36 MG/DL (ref 0–30)
WBC # BLD AUTO: 7.7 X10(3) UL (ref 4–11)
WBC #/AREA URNS AUTO: 69 /HPF

## 2020-07-02 PROCEDURE — 81001 URINALYSIS AUTO W/SCOPE: CPT

## 2020-07-02 PROCEDURE — 85025 COMPLETE CBC W/AUTO DIFF WBC: CPT

## 2020-07-02 PROCEDURE — 80053 COMPREHEN METABOLIC PANEL: CPT

## 2020-07-02 PROCEDURE — 36415 COLL VENOUS BLD VENIPUNCTURE: CPT

## 2020-07-02 PROCEDURE — 80061 LIPID PANEL: CPT

## 2020-08-07 ENCOUNTER — OFFICE VISIT (OUTPATIENT)
Dept: INTERNAL MEDICINE CLINIC | Facility: CLINIC | Age: 80
End: 2020-08-07
Payer: MEDICARE

## 2020-08-07 VITALS
HEART RATE: 75 BPM | BODY MASS INDEX: 34.6 KG/M2 | WEIGHT: 205.13 LBS | DIASTOLIC BLOOD PRESSURE: 79 MMHG | SYSTOLIC BLOOD PRESSURE: 126 MMHG | HEIGHT: 64.5 IN

## 2020-08-07 DIAGNOSIS — E78.2 MIXED HYPERLIPIDEMIA: ICD-10-CM

## 2020-08-07 DIAGNOSIS — Z12.31 VISIT FOR SCREENING MAMMOGRAM: ICD-10-CM

## 2020-08-07 DIAGNOSIS — R82.81 PYURIA: ICD-10-CM

## 2020-08-07 DIAGNOSIS — R93.1 ELEVATED CORONARY ARTERY CALCIUM SCORE: ICD-10-CM

## 2020-08-07 DIAGNOSIS — I10 ESSENTIAL HYPERTENSION: Primary | ICD-10-CM

## 2020-08-07 PROCEDURE — G0463 HOSPITAL OUTPT CLINIC VISIT: HCPCS | Performed by: INTERNAL MEDICINE

## 2020-08-07 PROCEDURE — 99214 OFFICE O/P EST MOD 30 MIN: CPT | Performed by: INTERNAL MEDICINE

## 2020-08-07 NOTE — PATIENT INSTRUCTIONS
Problem List Items Addressed This Visit        Unprioritized    Hypertension - Primary     Blood pressure 126/79, pulse 75, height 5' 4.5\" (1.638 m), weight 205 lb 1.6 oz (93 kg), not currently breastfeeding.   Blood pressure looks stable, well controlled Visit for screening mammogram        Relevant Orders    [facility] LAKIHSA 2D+3D SCREENING BILAT (CPT=77067/68399)

## 2020-08-07 NOTE — ASSESSMENT & PLAN NOTE
Blood pressure 126/79, pulse 75, height 5' 4.5\" (1.638 m), weight 205 lb 1.6 oz (93 kg), not currently breastfeeding. Blood pressure looks stable, well controlled at this time on lisinopril hydrochlorothiazide 20/20 5-1 tablet daily.   Renal functions–sli

## 2020-08-07 NOTE — ASSESSMENT & PLAN NOTE
Persistent elevation in the LDL cholesterol. Currently at 145. Was 132 in the past.  This is higher than the upper limit of normal of about 100. Patient does watch her diet carefully.   She does walk on a regular basis and has not had any chest pain palp

## 2020-08-07 NOTE — PROGRESS NOTES
HPI:    Patient ID: Karin Logan is a 79year old female. Urine test continues to show a few pus cells. We will discuss this at the next visit.    The blood sugar, liver functions, electrolytes and calcium levels look normal.   The kidney functions ar diseases include obesity. Factors aggravating her hyperlipidemia include fatty foods. Pertinent negatives include no leg pain or shortness of breath. Current antihyperlipidemic treatment includes exercise and diet change.  The current treatment provides no HENT:   Right Ear: External ear normal.   Left Ear: External ear normal.   Nose: Nose normal.   Mouth/Throat: Oropharynx is clear and moist. No oropharyngeal exudate. Eyes: Pupils are equal, round, and reactive to light.  Conjunctivae and EOM are normal small sample of urine to provide for testing. Repeat urine test and kidney function test have been ordered, will follow-up after completion. Patient is otherwise asymptomatic.           Relevant Orders    BASIC METABOLIC PANEL (8) (Completed)    COMP META ordered in this encounter       Imaging & Referrals:  CARDIO - INTERNAL  NARCISA LAKISHA 2D+3D SCREENING BILAT (CPT=77067/23228)       ZA#[id number]

## 2020-08-18 ENCOUNTER — APPOINTMENT (OUTPATIENT)
Dept: LAB | Facility: HOSPITAL | Age: 80
End: 2020-08-18
Attending: INTERNAL MEDICINE
Payer: MEDICARE

## 2020-08-18 LAB
ANION GAP SERPL CALC-SCNC: 5 MMOL/L (ref 0–18)
BACTERIA UR QL AUTO: NEGATIVE /HPF
BILIRUB UR QL: NEGATIVE
BUN BLD-MCNC: 19 MG/DL (ref 7–18)
BUN/CREAT SERPL: 18.1 (ref 10–20)
CALCIUM BLD-MCNC: 10.1 MG/DL (ref 8.5–10.1)
CHLORIDE SERPL-SCNC: 108 MMOL/L (ref 98–112)
CO2 SERPL-SCNC: 28 MMOL/L (ref 21–32)
COLOR UR: YELLOW
CREAT BLD-MCNC: 1.05 MG/DL (ref 0.55–1.02)
GLUCOSE BLD-MCNC: 83 MG/DL (ref 70–99)
GLUCOSE UR-MCNC: NEGATIVE MG/DL
HYALINE CASTS #/AREA URNS AUTO: 3 /LPF
KETONES UR-MCNC: NEGATIVE MG/DL
NITRITE UR QL STRIP.AUTO: NEGATIVE
OSMOLALITY SERPL CALC.SUM OF ELEC: 293 MOSM/KG (ref 275–295)
PATIENT FASTING Y/N/NP: NO
PH UR: 6 [PH] (ref 5–8)
POTASSIUM SERPL-SCNC: 4.1 MMOL/L (ref 3.5–5.1)
PROT UR-MCNC: 30 MG/DL
RBC #/AREA URNS AUTO: 12 /HPF
SODIUM SERPL-SCNC: 141 MMOL/L (ref 136–145)
SP GR UR STRIP: 1.02 (ref 1–1.03)
UROBILINOGEN UR STRIP-ACNC: <2
WBC #/AREA URNS AUTO: 16 /HPF

## 2020-08-18 PROCEDURE — 36415 COLL VENOUS BLD VENIPUNCTURE: CPT | Performed by: INTERNAL MEDICINE

## 2020-08-18 PROCEDURE — 80048 BASIC METABOLIC PNL TOTAL CA: CPT | Performed by: INTERNAL MEDICINE

## 2020-08-18 PROCEDURE — 81001 URINALYSIS AUTO W/SCOPE: CPT | Performed by: INTERNAL MEDICINE

## 2020-08-18 PROCEDURE — 87086 URINE CULTURE/COLONY COUNT: CPT | Performed by: INTERNAL MEDICINE

## 2020-10-23 RX ORDER — LISINOPRIL AND HYDROCHLOROTHIAZIDE 25; 20 MG/1; MG/1
TABLET ORAL
Qty: 90 TABLET | Refills: 1 | Status: SHIPPED | OUTPATIENT
Start: 2020-10-23 | End: 2021-04-19

## 2020-11-14 ENCOUNTER — TELEPHONE (OUTPATIENT)
Dept: INTERNAL MEDICINE CLINIC | Facility: CLINIC | Age: 80
End: 2020-11-14

## 2020-11-14 DIAGNOSIS — Z20.828 EXPOSURE TO SARS-ASSOCIATED CORONAVIRUS: Primary | ICD-10-CM

## 2020-11-14 NOTE — TELEPHONE ENCOUNTER
Patient  asking to have a covid 19 test.  Patient was with a friend on Sunday 11-8-2020 who has now tested positive for COVID 19 on Wednesday 11-. Patient is asymptomatic at this time. Patient is asymptomatic. [facility] order pended.     Patient has b

## 2020-11-14 NOTE — TELEPHONE ENCOUNTER
Patient is requesting to be tested due to exposure but does not have symptoms. informed patient the nurse will back them back to discuss. Yes

## 2020-11-16 NOTE — TELEPHONE ENCOUNTER
Pt was called and was informed of Dr. Jose Francisco Mayo message below and she verbalized understanding. Pt has a appt for tomorrow.    Future Appointments   Date Time Provider Cata Presley   11/17/2020  9:00 AM [address] LAB EM [facility]   12/7/2020 12

## 2020-11-17 ENCOUNTER — APPOINTMENT (OUTPATIENT)
Dept: LAB | Facility: HOSPITAL | Age: 80
End: 2020-11-17
Attending: INTERNAL MEDICINE
Payer: MEDICARE

## 2020-11-17 DIAGNOSIS — Z20.828 EXPOSURE TO SARS-ASSOCIATED CORONAVIRUS: ICD-10-CM

## 2020-12-23 ENCOUNTER — TELEPHONE (OUTPATIENT)
Dept: CARDIOLOGY CLINIC | Facility: CLINIC | Age: 80
End: 2020-12-23

## 2020-12-23 NOTE — TELEPHONE ENCOUNTER
Patient declined opening appointment 01-4-21 with Dr. Hua Arita. A busy week for her so she will keep consult on 2-1/21.

## 2020-12-24 ENCOUNTER — HOSPITAL ENCOUNTER (OUTPATIENT)
Dept: MAMMOGRAPHY | Facility: HOSPITAL | Age: 80
Discharge: HOME | End: 2020-12-24
Attending: INTERNAL MEDICINE
Payer: MEDICARE

## 2020-12-24 ENCOUNTER — LAB ENCOUNTER (OUTPATIENT)
Dept: LAB | Facility: HOSPITAL | Age: 80
End: 2020-12-24
Attending: INTERNAL MEDICINE
Payer: MEDICARE

## 2020-12-24 DIAGNOSIS — Z12.31 VISIT FOR SCREENING MAMMOGRAM: ICD-10-CM

## 2020-12-24 DIAGNOSIS — I10 ESSENTIAL HYPERTENSION: ICD-10-CM

## 2020-12-24 PROCEDURE — 80061 LIPID PANEL: CPT

## 2020-12-24 PROCEDURE — 80053 COMPREHEN METABOLIC PANEL: CPT

## 2020-12-24 PROCEDURE — 81001 URINALYSIS AUTO W/SCOPE: CPT

## 2020-12-24 PROCEDURE — 84443 ASSAY THYROID STIM HORMONE: CPT

## 2020-12-24 PROCEDURE — 85025 COMPLETE CBC W/AUTO DIFF WBC: CPT

## 2020-12-24 PROCEDURE — 36415 COLL VENOUS BLD VENIPUNCTURE: CPT

## 2020-12-24 PROCEDURE — 77063 BREAST TOMOSYNTHESIS BI: CPT | Performed by: INTERNAL MEDICINE

## 2020-12-24 PROCEDURE — 77067 SCR MAMMO BI INCL CAD: CPT | Performed by: INTERNAL MEDICINE

## 2020-12-29 ENCOUNTER — OFFICE VISIT (OUTPATIENT)
Dept: INTERNAL MEDICINE CLINIC | Facility: CLINIC | Age: 80
End: 2020-12-29
Payer: MEDICARE

## 2020-12-29 VITALS
TEMPERATURE: 98 F | HEIGHT: 64.5 IN | HEART RATE: 80 BPM | WEIGHT: 206 LBS | RESPIRATION RATE: 20 BRPM | BODY MASS INDEX: 34.74 KG/M2 | DIASTOLIC BLOOD PRESSURE: 80 MMHG | SYSTOLIC BLOOD PRESSURE: 136 MMHG

## 2020-12-29 DIAGNOSIS — Z00.00 MEDICARE ANNUAL WELLNESS VISIT, SUBSEQUENT: Primary | ICD-10-CM

## 2020-12-29 DIAGNOSIS — Z85.3 HISTORY OF BREAST CANCER: ICD-10-CM

## 2020-12-29 DIAGNOSIS — Z00.00 ENCOUNTER FOR ANNUAL HEALTH EXAMINATION: ICD-10-CM

## 2020-12-29 DIAGNOSIS — Z86.010 HISTORY OF COLON POLYPS: ICD-10-CM

## 2020-12-29 DIAGNOSIS — E78.2 MIXED HYPERLIPIDEMIA: ICD-10-CM

## 2020-12-29 DIAGNOSIS — R31.21 ASYMPTOMATIC MICROSCOPIC HEMATURIA: ICD-10-CM

## 2020-12-29 DIAGNOSIS — Z80.0 FAMILY HISTORY OF COLON CANCER: ICD-10-CM

## 2020-12-29 DIAGNOSIS — I10 ESSENTIAL HYPERTENSION: ICD-10-CM

## 2020-12-29 PROCEDURE — G0439 PPPS, SUBSEQ VISIT: HCPCS | Performed by: INTERNAL MEDICINE

## 2020-12-29 PROCEDURE — G0447 BEHAVIOR COUNSEL OBESITY 15M: HCPCS | Performed by: INTERNAL MEDICINE

## 2020-12-29 NOTE — ASSESSMENT & PLAN NOTE
Lipid panel and liver function tests have been stable, LDL levels much better at this point. Patient prefers not to take medication. She has been exercising on a regular basis–walks about 3 miles daily.   She does not have any chest pain, palpitations or

## 2020-12-29 NOTE — PATIENT INSTRUCTIONS
Problem List Items Addressed This Visit        Unprioritized    Asymptomatic microscopic hematuria    Relevant Orders    US KIDNEY/BLADDER (CPT=76770)    Body mass index (BMI) of 35.0-35.9 in adult     Patient is advised to start on a strict diet control p abnormalities, discharge from the nipples or axillary adenopathy. Mammogram just completed looked stable. Repeat in 1 year, 2021 December. Bone density scan looked normal for age.   Due for a follow-up evaluation in 2021  No cervical or inguinal lymphade Orders    COMP METABOLIC PANEL (14)    CBC WITH DIFFERENTIAL WITH PLATELET    LIPID PANEL      Other Visit Diagnoses     Encounter for annual health examination        Body mass index (BMI) of 34.0-34.9 in adult        Relevant Orders    BEHAVIOR  O meet one of the following criteria:   • Men who are 73-68 years old and have smoked more than 100 cigarettes in their lifetime   • Anyone with a family history    Colorectal Cancer Screening  Covered up to Age 76     Colonoscopy Screen   Covered every 10 y Immunizations      Influenza  Covered Annually No orders found for this or any previous visit.  Please get every year    Pneumococcal 13 (Prevnar)  Covered Once after 65 Orders placed or performed in visit on 07/06/17   • PNEUMOCOCCAL VACC, 13 JI IM    P Directives.

## 2020-12-29 NOTE — ASSESSMENT & PLAN NOTE
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 She was treated with lumpectomy, sentinel node dissection, radiation as well as hormonal treatment for 5 years.   She has been asymptomatic and h

## 2020-12-29 NOTE — ASSESSMENT & PLAN NOTE
Patient is advised to start on a strict diet control protocol with reduction in starches, food, desserts in the diet. Smaller portion sizes of meals. Avoid unnecessary snacks. Continue to exercise as directed. Follow-up in about 6 months to 1 year.

## 2020-12-29 NOTE — ASSESSMENT & PLAN NOTE
Normal exam.  Labs as ordered. Body mass index is 34.81 kg/m². Skin check normal.  No significant abnormal nevi. Breast exam completed–no palpable abnormalities, discharge from the nipples or axillary adenopathy.   Mammogram just completed looked stable

## 2020-12-29 NOTE — ASSESSMENT & PLAN NOTE
Patient has been asymptomatic. No change in bowel movements, blood in stools or black stools. No anemia. Follow-up colonoscopy due in March 2021.

## 2020-12-29 NOTE — ASSESSMENT & PLAN NOTE
Family history of colon cancer as well as personal history of multiple colon polyps. Her last colonoscopy was completed in 2017 and due in 5 years–next due in March 2021.

## 2020-12-29 NOTE — PROGRESS NOTES
HPI:   Darrick Rodriguez is a 79year old female who presents for a Medicare Subsequent Annual Wellness visit (Pt already had Initial Annual Wellness).       Her last annual assessment has been over 1 year: Annual Physical due on 12/29/2021         Fall/Risk Care Team: Patient Care Team:  Lisa Rivera MD as PCP - General (Internal Medicine)  Lisa Rivera MD as PCP - [facility]    Patient Active Problem List:     Family history of colon cancer     History of colon polyps     Hypertension     History of breast can history that includes other surgical history; other surgical history; other surgical history; lumpectomy left (); radiation left (); ; and colonoscopy (N/A, 3/5/2018).     Her family history includes Breast Cancer (age of onset: 72) in her self; the telephone ring or doorbell: No I have trouble hearing conversations in a noisy background such as a crowded room or restaurant: Sometimes   I get confused about where sounds come from: No I misunderstand some words in a sentence and need to ask people change and no tenderness. Left breast exhibits no inverted nipple, no mass, no nipple discharge, no skin change and no tenderness. Breasts are symmetrical.   Abdominal: Soft. Bowel sounds are normal. She exhibits no distension and no mass.  There is no hepa Vaccine Live (Zostavax) 08/01/2015, 11/18/2015   Pended Date(s) Pended   • Influenza Vaccine Refused 12/10/2019   Deferred Date(s) Deferred   • >=3 YRS TRI  MULTIDOSE VIAL (79979) FLU CLINIC 11/16/2018        ASSESSMENT AND OTHER RELEVANT CHRONIC CONDITION fluids and recheck labs as directed. Slight hematuria. Follow-up ultrasound on the kidneys have been requested. Medicare annual wellness visit, subsequent - Primary     Normal exam.  Labs as ordered. Body mass index is 34.81 kg/m².    Skin chec regular basis–walks about 3 miles daily. She does not have any chest pain, palpitations or shortness of breath. CT calcium scoring heart scan with some plaque noted in the right coronary artery. She has had similar evaluation first CT angiogram in 2015. Cholesterol (mg/dL)   Date Value   12/24/2020 128 (H)        EKG - w/ Initial Preventative Physical Exam only, or if medically necessary Electrocardiogram date     Colorectal Cancer Screening      Colonoscopy Screen every 10 years Colonoscopy due on 03/05/ injectable drug abusers     Tetanus Toxoid  Only covered with a cut with metal- TD and TDaP Not covered by Medicare Part B) No vaccine history found This may be covered with your prescription benefits, but Medicare does not cover unless Medically needed restriction as well as avoidance of unnecessary snacks based on Chandrika Strange's interest and willingness to change.     Assist: We used behavior change techniques  Including self-help and counseling to aid in [address] achieving these agreed-upon goals

## 2020-12-29 NOTE — ASSESSMENT & PLAN NOTE
Blood pressure 136/80, pulse 80, temperature 97.8 °F (36.6 °C), temperature source Axillary, resp. rate 20, height 5' 4.5\" (1.638 m), weight 206 lb (93.4 kg), not currently breastfeeding. Blood pressures tend to fluctuate due to anxiety.   Currently on

## 2021-01-05 ENCOUNTER — TELEPHONE (OUTPATIENT)
Dept: GASTROENTEROLOGY | Facility: CLINIC | Age: 81
End: 2021-01-05

## 2021-01-05 NOTE — TELEPHONE ENCOUNTER
----- Message from Kurt Hilton, [address] sent at 3/12/2018 10:10 AM CDT -----  Regarding: Recall colon  Recall colon in 3 years per GS.  Colon done 3-5-18

## 2021-01-15 ENCOUNTER — TELEPHONE (OUTPATIENT)
Dept: GASTROENTEROLOGY | Facility: CLINIC | Age: 81
End: 2021-01-15

## 2021-01-15 DIAGNOSIS — Z86.010 HISTORY OF COLON POLYPS: ICD-10-CM

## 2021-01-15 DIAGNOSIS — Z80.0 FH: COLON CANCER: Primary | ICD-10-CM

## 2021-01-15 NOTE — TELEPHONE ENCOUNTER
Last Procedure, Date, MD:  Colonoscopy with polypectomy with Dr. Claudell Public on 03/05/2018    Last Diagnosis:  Cancer screen, family hx colon cancer, personal hx adenomatous colon polyps    Recalled for (mth/yrs): 3 years    Sedation used previously:  IV

## 2021-01-18 RX ORDER — SODIUM, POTASSIUM,MAG SULFATES 17.5-3.13G
SOLUTION, RECONSTITUTED, ORAL ORAL
Qty: 1 BOTTLE | Refills: 0 | Status: SHIPPED | OUTPATIENT
Start: 2021-01-18 | End: 2021-04-05

## 2021-01-18 NOTE — TELEPHONE ENCOUNTER
The patient's chart has been reviewed. Okay to schedule pt for 3 year colonoscopy recall r/t family history of colon cancer, history of colon polyps with Dr. López .      Advise IV twilight or MAC sedation with split dose Suprep or Colyte/TriLyte or e

## 2021-01-21 NOTE — TELEPHONE ENCOUNTER
Patient calling for update on scheduling colonoscopy, requesting any day for the last two weeks of March or after. Please call at:[phone],thanks.

## 2021-01-21 NOTE — TELEPHONE ENCOUNTER
Scheduled for:  Colonoscopy 07820   Provider Name:  Dr Malave Host  Date:  04/21/2021  Location:  [facility]  Sedation:  MAC  Time:  3277 (pt is aware to arrive at 0815)  Prep:  [facility] [address]  Meds/Allergies Reconciled?:  Physician reviewed  Diagnosis with codes:  Hx o

## 2021-02-01 ENCOUNTER — OFFICE VISIT (OUTPATIENT)
Dept: CARDIOLOGY CLINIC | Facility: CLINIC | Age: 81
End: 2021-02-01
Payer: MEDICARE

## 2021-02-01 VITALS
WEIGHT: 203 LBS | BODY MASS INDEX: 34.24 KG/M2 | HEIGHT: 64.5 IN | DIASTOLIC BLOOD PRESSURE: 86 MMHG | SYSTOLIC BLOOD PRESSURE: 164 MMHG | HEART RATE: 84 BPM

## 2021-02-01 DIAGNOSIS — I25.84 CORONARY ARTERY CALCIFICATION: Primary | ICD-10-CM

## 2021-02-01 DIAGNOSIS — I25.10 CORONARY ARTERY CALCIFICATION: Primary | ICD-10-CM

## 2021-02-01 DIAGNOSIS — E78.2 MIXED HYPERLIPIDEMIA: ICD-10-CM

## 2021-02-01 PROCEDURE — 99204 OFFICE O/P NEW MOD 45 MIN: CPT | Performed by: INTERNAL MEDICINE

## 2021-02-01 PROCEDURE — 93000 ELECTROCARDIOGRAM COMPLETE: CPT | Performed by: INTERNAL MEDICINE

## 2021-02-01 NOTE — PROGRESS NOTES
Name:  Espinoza Giles  :     Date of consultation:   2021    Referring physician: Jamil Powers MD    Reason for Visit:  Patient presents with:  Consult  Test Results: Heart scan  Hypertension  Hyperlipidemia      HPI:   Patient was seen her blood pressure    • History of perforated ear drum    • Unspecified essential hypertension       Social History:  Social History    Tobacco Use      Smoking status: Never Smoker      Smokeless tobacco: Never Used    Alcohol use:  Yes      Alcohol/week: 0.0 1.3 03/21/2015     12/24/2020    K 3.7 12/24/2020     12/24/2020    CO2 30.0 12/24/2020        ASSESSMENT AND PLAN   1. Coronary artery calcification  Her coronary calcium as above dominant 5 to 6 years.   Recommend repeat Dm stress test.  We

## 2021-02-02 ENCOUNTER — ORDER TRANSCRIPTION (OUTPATIENT)
Dept: ADMINISTRATIVE | Facility: HOSPITAL | Age: 81
End: 2021-02-02

## 2021-02-02 DIAGNOSIS — Z01.818 PREOP EXAMINATION: Primary | ICD-10-CM

## 2021-02-08 ENCOUNTER — HOSPITAL ENCOUNTER (OUTPATIENT)
Dept: ULTRASOUND IMAGING | Facility: HOSPITAL | Age: 81
Discharge: HOME | End: 2021-02-08
Attending: INTERNAL MEDICINE
Payer: MEDICARE

## 2021-02-08 DIAGNOSIS — R31.21 ASYMPTOMATIC MICROSCOPIC HEMATURIA: ICD-10-CM

## 2021-02-08 PROCEDURE — 76770 US EXAM ABDO BACK WALL COMP: CPT | Performed by: INTERNAL MEDICINE

## 2021-03-02 ENCOUNTER — TELEPHONE (OUTPATIENT)
Dept: GASTROENTEROLOGY | Facility: CLINIC | Age: 81
End: 2021-03-02

## 2021-03-02 DIAGNOSIS — Z86.010 PERSONAL HISTORY OF COLONIC POLYPS: Primary | ICD-10-CM

## 2021-03-02 DIAGNOSIS — Z80.0 FAMILY HISTORY OF COLON CANCER: ICD-10-CM

## 2021-03-02 NOTE — TELEPHONE ENCOUNTER
Rescheduled for:  Colonoscopy - 14473               Provider Name:  Dr. Hali Brothers  Date:  FROM - 4/21/21                TO - 6/22/21  Location:  [facility]  Sedation:  MAC  Time:  FROM - 9:15 am                TO - 11:15 am (pt is aware to arrive at 10:15 am)

## 2021-03-03 DIAGNOSIS — Z23 NEED FOR VACCINATION: ICD-10-CM

## 2021-03-06 ENCOUNTER — LAB ENCOUNTER (OUTPATIENT)
Dept: LAB | Facility: HOSPITAL | Age: 81
End: 2021-03-06
Attending: INTERNAL MEDICINE
Payer: MEDICARE

## 2021-03-06 DIAGNOSIS — Z01.818 PREOP EXAMINATION: ICD-10-CM

## 2021-03-06 LAB — SARS-COV-2 RNA RESP QL NAA+PROBE: NOT DETECTED

## 2021-03-09 ENCOUNTER — HOSPITAL ENCOUNTER (OUTPATIENT)
Dept: NUCLEAR MEDICINE | Facility: HOSPITAL | Age: 81
Discharge: HOME | End: 2021-03-09
Attending: INTERNAL MEDICINE
Payer: MEDICARE

## 2021-03-09 ENCOUNTER — HOSPITAL ENCOUNTER (OUTPATIENT)
Dept: CV DIAGNOSTICS | Facility: HOSPITAL | Age: 81
Discharge: HOME | End: 2021-03-09
Attending: INTERNAL MEDICINE
Payer: MEDICARE

## 2021-03-09 DIAGNOSIS — I25.10 CORONARY ARTERY CALCIFICATION: ICD-10-CM

## 2021-03-09 DIAGNOSIS — I25.84 CORONARY ARTERY CALCIFICATION: ICD-10-CM

## 2021-03-09 PROCEDURE — 93016 CV STRESS TEST SUPVJ ONLY: CPT | Performed by: INTERNAL MEDICINE

## 2021-03-09 PROCEDURE — 93017 CV STRESS TEST TRACING ONLY: CPT | Performed by: INTERNAL MEDICINE

## 2021-03-09 PROCEDURE — 78452 HT MUSCLE IMAGE SPECT MULT: CPT | Performed by: INTERNAL MEDICINE

## 2021-03-09 PROCEDURE — 93018 CV STRESS TEST I&R ONLY: CPT | Performed by: INTERNAL MEDICINE

## 2021-03-09 NOTE — IMAGING NOTE
Patient here in cardiac diagnostics for outpatient nuclear exercise stress test ordered by Dr. Mandeep Wiseman. Baseline resting ECG with ST-T wave abnormality. Asymptomatic. I reviewed baseline stress ECG with Dr. Jake White, cardiology go-to for today.  Orders received

## 2021-03-11 ENCOUNTER — TELEPHONE (OUTPATIENT)
Dept: CARDIOLOGY CLINIC | Facility: CLINIC | Age: 81
End: 2021-03-11

## 2021-03-11 DIAGNOSIS — R94.39 ABNORMAL STRESS TEST: Primary | ICD-10-CM

## 2021-03-11 RX ORDER — METOPROLOL TARTRATE 50 MG/1
50 TABLET, FILM COATED ORAL
Qty: 2 TABLET | Refills: 0 | Status: SHIPPED | OUTPATIENT
Start: 2021-03-11 | End: 2021-03-11

## 2021-03-11 NOTE — TELEPHONE ENCOUNTER
Spoke with Regis Zapien, reviewed stress test results, advised on  recommendation, patient will proceed with CTA test, advised about lopressor 50mg the night before the test and morning of the test another 50 mg. Patient verbalizes understanding.

## 2021-03-17 RX ORDER — METOPROLOL TARTRATE 5 MG/5ML
5 INJECTION INTRAVENOUS SEE ADMIN INSTRUCTIONS
Status: DISCONTINUED | OUTPATIENT
Start: 2021-03-18 | End: 2021-03-20

## 2021-03-17 RX ORDER — NITROGLYCERIN 0.4 MG/1
0.4 TABLET SUBLINGUAL ONCE
Status: DISCONTINUED | OUTPATIENT
Start: 2021-03-18 | End: 2021-03-20

## 2021-03-17 RX ORDER — DILTIAZEM HYDROCHLORIDE 5 MG/ML
5 INJECTION INTRAVENOUS SEE ADMIN INSTRUCTIONS
Status: DISCONTINUED | OUTPATIENT
Start: 2021-03-18 | End: 2021-03-20

## 2021-03-18 ENCOUNTER — CLINICAL ABSTRACT (OUTPATIENT)
Dept: CARDIOLOGY | Age: 81
End: 2021-03-18

## 2021-03-18 ENCOUNTER — HOSPITAL ENCOUNTER (OUTPATIENT)
Dept: CT IMAGING | Facility: HOSPITAL | Age: 81
Discharge: HOME | End: 2021-03-18
Attending: INTERNAL MEDICINE
Payer: MEDICARE

## 2021-03-18 VITALS
HEART RATE: 70 BPM | OXYGEN SATURATION: 98 % | SYSTOLIC BLOOD PRESSURE: 124 MMHG | RESPIRATION RATE: 17 BRPM | HEIGHT: 64 IN | DIASTOLIC BLOOD PRESSURE: 67 MMHG | BODY MASS INDEX: 34.83 KG/M2 | WEIGHT: 204 LBS

## 2021-03-18 DIAGNOSIS — R94.39 ABNORMAL STRESS TEST: ICD-10-CM

## 2021-03-18 LAB — CREAT BLD-MCNC: 0.9 MG/DL

## 2021-03-18 PROCEDURE — 75574 CT ANGIO HRT W/3D IMAGE: CPT | Performed by: INTERNAL MEDICINE

## 2021-03-18 PROCEDURE — 82565 ASSAY OF CREATININE: CPT

## 2021-03-18 RX ORDER — DILTIAZEM HYDROCHLORIDE 5 MG/ML
INJECTION INTRAVENOUS
Status: DISPENSED
Start: 2021-03-18 | End: 2021-03-18

## 2021-03-18 RX ORDER — METOPROLOL TARTRATE 5 MG/5ML
INJECTION INTRAVENOUS
Status: DISPENSED
Start: 2021-03-18 | End: 2021-03-18

## 2021-03-18 RX ADMIN — DILTIAZEM HYDROCHLORIDE 5 MG: 5 INJECTION INTRAVENOUS at 11:30:00

## 2021-03-18 RX ADMIN — METOPROLOL TARTRATE 5 MG: 5 INJECTION INTRAVENOUS at 10:55:00

## 2021-03-18 RX ADMIN — METOPROLOL TARTRATE 5 MG: 5 INJECTION INTRAVENOUS at 11:05:00

## 2021-03-18 RX ADMIN — DILTIAZEM HYDROCHLORIDE 5 MG: 5 INJECTION INTRAVENOUS at 11:41:00

## 2021-03-18 RX ADMIN — Medication 100 MG: at 09:00:00

## 2021-03-18 RX ADMIN — DILTIAZEM HYDROCHLORIDE 5 MG: 5 INJECTION INTRAVENOUS at 11:20:00

## 2021-03-18 RX ADMIN — Medication 100 MG: at 09:45:00

## 2021-03-18 RX ADMIN — DILTIAZEM HYDROCHLORIDE 5 MG: 5 INJECTION INTRAVENOUS at 11:35:00

## 2021-03-18 RX ADMIN — METOPROLOL TARTRATE 5 MG: 5 INJECTION INTRAVENOUS at 10:50:00

## 2021-03-18 RX ADMIN — METOPROLOL TARTRATE 5 MG: 5 INJECTION INTRAVENOUS at 11:00:00

## 2021-03-18 RX ADMIN — DILTIAZEM HYDROCHLORIDE 5 MG: 5 INJECTION INTRAVENOUS at 11:25:00

## 2021-03-18 NOTE — IMAGING NOTE
0845 TO RAD HOLDING       HX TAKEN :    Associated DX: Abnormal stress test , NKDA, NO PT C/O AT THIS TIME     0845 PT CONSENTED     BASELINE VITAL SIGNS   HR 76   /74 BMI 35 / LBS     CTA ORDERED BY DUGLAS  WAS PT GIVEN CTA  PREMEDS YES     0900

## 2021-03-19 ENCOUNTER — TELEPHONE (OUTPATIENT)
Dept: CARDIOLOGY CLINIC | Facility: CLINIC | Age: 81
End: 2021-03-19

## 2021-03-19 NOTE — TELEPHONE ENCOUNTER
Dicussed in length CTA results with [facility], she is questioning  necessity for angiogram at this time, advised to see Maria Luisa Overton, appointment made for Monday 3/22/21      MANISHA Rae Em Cardio Clinical Staff  CTA is abnormal, per Dr. Deb Gan pt need

## 2021-03-22 ENCOUNTER — OFFICE VISIT (OUTPATIENT)
Dept: CARDIOLOGY CLINIC | Facility: CLINIC | Age: 81
End: 2021-03-22
Payer: MEDICARE

## 2021-03-22 VITALS
HEIGHT: 64 IN | BODY MASS INDEX: 35 KG/M2 | WEIGHT: 205 LBS | DIASTOLIC BLOOD PRESSURE: 82 MMHG | SYSTOLIC BLOOD PRESSURE: 129 MMHG | HEART RATE: 88 BPM

## 2021-03-22 DIAGNOSIS — I25.84 CORONARY ARTERY CALCIFICATION: Primary | ICD-10-CM

## 2021-03-22 DIAGNOSIS — I25.10 CORONARY ARTERY CALCIFICATION: Primary | ICD-10-CM

## 2021-03-22 PROCEDURE — 99214 OFFICE O/P EST MOD 30 MIN: CPT | Performed by: NURSE PRACTITIONER

## 2021-03-22 NOTE — PROGRESS NOTES
Lorenzo Monryo is a 79year old female. Patient presents with: Follow - Up: Coronary artery calcifiction  Testing    HPI:   Patient comes in today for follow-up.  She sees Dr. Ojeda Page was seen in February she has a previous history of nonobstructive coronary [facility] perforated ear drum    • Unspecified essential hypertension       Social History:  Social History    Tobacco Use      Smoking status: Never Smoker      Smokeless tobacco: Never Used    Vaping Use      Vaping Use: Never used    Alcohol use:  Yes      Alcohol

## 2021-03-24 ENCOUNTER — NURSE ONLY (OUTPATIENT)
Dept: LAB | Facility: HOSPITAL | Age: 81
End: 2021-03-24
Attending: INTERNAL MEDICINE
Payer: MEDICARE

## 2021-03-24 ENCOUNTER — CLINICAL ABSTRACT (OUTPATIENT)
Dept: CARDIOLOGY | Age: 81
End: 2021-03-24

## 2021-03-24 DIAGNOSIS — Z01.818 PRE-OP TESTING: ICD-10-CM

## 2021-03-24 LAB
ANION GAP SERPL CALC-SCNC: 3 MMOL/L (ref 0–18)
BUN BLD-MCNC: 17 MG/DL (ref 7–18)
BUN SERPL-MCNC: 17 MG/DL
BUN/CREAT SERPL: 19.3 (ref 10–20)
CALCIUM BLD-MCNC: 10.3 MG/DL (ref 8.5–10.1)
CALCIUM SERPL-MCNC: 10.3 MG/DL
CHLORIDE SERPL-SCNC: 107 MMOL/L
CHLORIDE SERPL-SCNC: 107 MMOL/L (ref 98–112)
CO2 SERPL-SCNC: 32 MMOL/L (ref 21–32)
COVID-19 HIGH SENSITIVITY PCR: NORMAL
CREAT BLD-MCNC: 0.88 MG/DL
CREAT SERPL-MCNC: 0.88 MG/DL
DEPRECATED RDW RBC AUTO: 43.8 FL (ref 35.1–46.3)
ERYTHROCYTE [DISTWIDTH] IN BLOOD BY AUTOMATED COUNT: 13 % (ref 11–15)
GLUCOSE BLD-MCNC: 81 MG/DL (ref 70–99)
GLUCOSE SERPL-MCNC: 81 MG/DL
HCT VFR BLD AUTO: 42.6 %
HCT VFR BLD CALC: 42.6 %
HGB BLD-MCNC: 13.5 G/DL
HGB BLD-MCNC: 13.5 G/DL
MCH RBC QN AUTO: 29.2 PG (ref 26–34)
MCHC RBC AUTO-ENTMCNC: 31.7 G/DL (ref 31–37)
MCV RBC AUTO: 92 FL
OSMOLALITY SERPL CALC.SUM OF ELEC: 295 MOSM/KG (ref 275–295)
PATIENT FASTING Y/N/NP: YES
PLATELET # BLD AUTO: 241 10(3)UL (ref 150–450)
PLATELET # BLD: 241 K/MCL
POTASSIUM SERPL-SCNC: 3.9 MMOL/L
POTASSIUM SERPL-SCNC: 3.9 MMOL/L (ref 3.5–5.1)
RBC # BLD AUTO: 4.63 X10(6)UL
RBC # BLD: 4.63 10*6/UL
SARS-COV-2 RNA RESP QL NAA+PROBE: NOT DETECTED
SODIUM SERPL-SCNC: 142 MMOL/L
SODIUM SERPL-SCNC: 142 MMOL/L (ref 136–145)
WBC # BLD AUTO: 6.6 X10(3) UL (ref 4–11)
WBC # BLD: 6.6 K/MCL

## 2021-03-24 PROCEDURE — 85027 COMPLETE CBC AUTOMATED: CPT

## 2021-03-24 PROCEDURE — 36415 COLL VENOUS BLD VENIPUNCTURE: CPT

## 2021-03-24 PROCEDURE — 80048 BASIC METABOLIC PNL TOTAL CA: CPT

## 2021-03-25 ENCOUNTER — CLINICAL ABSTRACT (OUTPATIENT)
Dept: CARDIOLOGY | Age: 81
End: 2021-03-25

## 2021-03-25 ENCOUNTER — TELEPHONE (OUTPATIENT)
Dept: CARDIOLOGY CLINIC | Facility: CLINIC | Age: 81
End: 2021-03-25

## 2021-03-25 NOTE — TELEPHONE ENCOUNTER
pt. requesting to speak to the nurse about instructions for angiogram sched for tomorrow at 9:45am., Pt. Is not sure of what meds to take or stop. Pt. Wants to know if she needs to stop eating at a certain time? Pt. States that no one has called her.

## 2021-03-25 NOTE — TELEPHONE ENCOUNTER
Called and spoke to [facility], she states she just spoke with cath lab and all questions answered.

## 2021-03-26 ENCOUNTER — HOSPITAL ENCOUNTER (OUTPATIENT)
Dept: INTERVENTIONAL RADIOLOGY/VASCULAR | Facility: HOSPITAL | Age: 81
Discharge: HOME | End: 2021-03-26
Attending: INTERNAL MEDICINE | Admitting: INTERNAL MEDICINE
Payer: MEDICARE

## 2021-03-26 VITALS
HEIGHT: 64 IN | DIASTOLIC BLOOD PRESSURE: 79 MMHG | BODY MASS INDEX: 35 KG/M2 | SYSTOLIC BLOOD PRESSURE: 150 MMHG | OXYGEN SATURATION: 98 % | WEIGHT: 205 LBS | TEMPERATURE: 98 F | HEART RATE: 80 BPM | RESPIRATION RATE: 20 BRPM

## 2021-03-26 DIAGNOSIS — Z01.818 PRE-OP TESTING: Primary | ICD-10-CM

## 2021-03-26 DIAGNOSIS — R93.89 ABNORMAL COMPUTED TOMOGRAPHY ANGIOGRAPHY (CTA): ICD-10-CM

## 2021-03-26 PROCEDURE — 99153 MOD SED SAME PHYS/QHP EA: CPT

## 2021-03-26 PROCEDURE — 4A033BC MEASUREMENT OF ARTERIAL PRESSURE, CORONARY, PERCUTANEOUS APPROACH: ICD-10-PCS | Performed by: INTERNAL MEDICINE

## 2021-03-26 PROCEDURE — 99152 MOD SED SAME PHYS/QHP 5/>YRS: CPT

## 2021-03-26 PROCEDURE — B2151ZZ FLUOROSCOPY OF LEFT HEART USING LOW OSMOLAR CONTRAST: ICD-10-PCS | Performed by: INTERNAL MEDICINE

## 2021-03-26 PROCEDURE — B2111ZZ FLUOROSCOPY OF MULTIPLE CORONARY ARTERIES USING LOW OSMOLAR CONTRAST: ICD-10-PCS | Performed by: INTERNAL MEDICINE

## 2021-03-26 PROCEDURE — 027135Z DILATION OF CORONARY ARTERY, TWO ARTERIES WITH TWO DRUG-ELUTING INTRALUMINAL DEVICES, PERCUTANEOUS APPROACH: ICD-10-PCS | Performed by: INTERNAL MEDICINE

## 2021-03-26 PROCEDURE — 99152 MOD SED SAME PHYS/QHP 5/>YRS: CPT | Performed by: INTERNAL MEDICINE

## 2021-03-26 PROCEDURE — 93458 L HRT ARTERY/VENTRICLE ANGIO: CPT

## 2021-03-26 PROCEDURE — 36415 COLL VENOUS BLD VENIPUNCTURE: CPT

## 2021-03-26 PROCEDURE — 4A023N7 MEASUREMENT OF CARDIAC SAMPLING AND PRESSURE, LEFT HEART, PERCUTANEOUS APPROACH: ICD-10-PCS | Performed by: INTERNAL MEDICINE

## 2021-03-26 PROCEDURE — 93458 L HRT ARTERY/VENTRICLE ANGIO: CPT | Performed by: INTERNAL MEDICINE

## 2021-03-26 PROCEDURE — 93571 IV DOP VEL&/PRESS C FLO 1ST: CPT | Performed by: INTERNAL MEDICINE

## 2021-03-26 PROCEDURE — 93571 IV DOP VEL&/PRESS C FLO 1ST: CPT

## 2021-03-26 PROCEDURE — 92928 PRQ TCAT PLMT NTRAC ST 1 LES: CPT | Performed by: INTERNAL MEDICINE

## 2021-03-26 RX ORDER — CLOPIDOGREL BISULFATE 75 MG/1
75 TABLET ORAL DAILY
Status: DISCONTINUED | OUTPATIENT
Start: 2021-03-27 | End: 2021-03-26

## 2021-03-26 RX ORDER — MIDAZOLAM HYDROCHLORIDE 1 MG/ML
INJECTION INTRAMUSCULAR; INTRAVENOUS
Status: COMPLETED
Start: 2021-03-26 | End: 2021-03-26

## 2021-03-26 RX ORDER — CLOPIDOGREL BISULFATE 75 MG/1
TABLET ORAL
Status: COMPLETED
Start: 2021-03-26 | End: 2021-03-26

## 2021-03-26 RX ORDER — SODIUM CHLORIDE 9 MG/ML
INJECTION, SOLUTION INTRAVENOUS CONTINUOUS
Status: DISCONTINUED | OUTPATIENT
Start: 2021-03-26 | End: 2021-03-26

## 2021-03-26 RX ORDER — ASPIRIN 81 MG/1
81 TABLET ORAL DAILY
Qty: 30 TABLET | Refills: 1 | Status: SHIPPED | OUTPATIENT
Start: 2021-03-27

## 2021-03-26 RX ORDER — ASPIRIN 81 MG/1
81 TABLET ORAL DAILY
Status: DISCONTINUED | OUTPATIENT
Start: 2021-03-27 | End: 2021-03-26

## 2021-03-26 RX ORDER — CLOPIDOGREL BISULFATE 75 MG/1
75 TABLET ORAL DAILY
Qty: 30 TABLET | Refills: 3 | Status: SHIPPED | OUTPATIENT
Start: 2021-03-27 | End: 2021-04-05

## 2021-03-26 RX ORDER — VERAPAMIL HYDROCHLORIDE 2.5 MG/ML
INJECTION, SOLUTION INTRAVENOUS
Status: COMPLETED
Start: 2021-03-26 | End: 2021-03-26

## 2021-03-26 RX ORDER — SODIUM CHLORIDE 9 MG/ML
INJECTION, SOLUTION INTRAVENOUS
Status: DISCONTINUED | OUTPATIENT
Start: 2021-03-26 | End: 2021-03-26

## 2021-03-26 RX ORDER — ROSUVASTATIN CALCIUM 20 MG/1
20 TABLET, COATED ORAL NIGHTLY
Qty: 30 TABLET | Refills: 1 | Status: SHIPPED | OUTPATIENT
Start: 2021-03-26 | End: 2021-04-05

## 2021-03-26 RX ORDER — LIDOCAINE HYDROCHLORIDE 20 MG/ML
INJECTION, SOLUTION EPIDURAL; INFILTRATION; INTRACAUDAL; PERINEURAL
Status: COMPLETED
Start: 2021-03-26 | End: 2021-03-26

## 2021-03-26 RX ORDER — NITROGLYCERIN 20 MG/100ML
INJECTION INTRAVENOUS
Status: COMPLETED
Start: 2021-03-26 | End: 2021-03-26

## 2021-03-26 RX ORDER — HEPARIN SODIUM 1000 [USP'U]/ML
INJECTION, SOLUTION INTRAVENOUS; SUBCUTANEOUS
Status: COMPLETED
Start: 2021-03-26 | End: 2021-03-26

## 2021-03-26 NOTE — PROCEDURES
[facility]    [facility] Cardiac Cath Procedure Note  Espinoza Tisha Patient Status:  Outpatient in a Bed    1940 MRN [id number]   Location [facility] Attending Jamie Ramirez, [address] [facility] Day # 0 PCP Maryuri Farmer FERNANDO  Post-dil:  none      RCA intervention  Lesion Characteristics- not torturous, moderately calcified. Type non-C lesion. Pre-intervention stenosis 99%, Post intervention stenosis 0%.   Pre ANGELICA 3, Post ANGELICA 3.      Guide Catheter: JR4  Wire: Marco KIM

## 2021-03-26 NOTE — DIETARY NOTE
NUTRITION EDUCATION NOTE    Received consult for nutrition education per cardiac rehab order set. Appropriate education and handout(s) provided. See education section of Epic for specifics.     Sima Small, [address] [place] [address], [facility]

## 2021-03-26 NOTE — IVS NOTE
DISCHARGE NOTE     Pt is able to sit up and ambulate without difficulty. Pt voided and tolerated fluids and food. Procedural site remains dry and intact with good circulation, motion, and sensation. No signs and symptoms of bleeding/hematoma noted.  Instruc

## 2021-03-26 NOTE — INTERVAL H&P NOTE
Pre-op Diagnosis: * No pre-op diagnosis entered *    The above referenced H&P was reviewed by Rigoberto Bautista MD on 3/26/2021, the patient was examined and no significant changes have occurred in the patient's condition since the H&P was performed.   I discusse

## 2021-03-26 NOTE — CARDIAC REHAB
Cardiac Rehab Phase I    Activity:  Distance Bedrest post procedure  Assistance needed   Patient tolerated activity . Education:  Handouts provided and reviewed: [address] Diet: Healthy Cardiac diet reviewed.     Disease Proces

## 2021-04-02 ENCOUNTER — OFFICE VISIT (OUTPATIENT)
Dept: CARDIOLOGY CLINIC | Facility: CLINIC | Age: 81
End: 2021-04-02
Payer: MEDICARE

## 2021-04-02 VITALS
SYSTOLIC BLOOD PRESSURE: 142 MMHG | BODY MASS INDEX: 36.02 KG/M2 | DIASTOLIC BLOOD PRESSURE: 77 MMHG | RESPIRATION RATE: 18 BRPM | HEIGHT: 64 IN | HEART RATE: 86 BPM | WEIGHT: 211 LBS

## 2021-04-02 DIAGNOSIS — I25.84 CORONARY ARTERY CALCIFICATION: Primary | ICD-10-CM

## 2021-04-02 DIAGNOSIS — I25.10 CORONARY ARTERY CALCIFICATION: Primary | ICD-10-CM

## 2021-04-02 DIAGNOSIS — I25.10 CORONARY ARTERY DISEASE INVOLVING NATIVE CORONARY ARTERY OF NATIVE HEART WITHOUT ANGINA PECTORIS: ICD-10-CM

## 2021-04-02 PROCEDURE — 1111F DSCHRG MED/CURRENT MED MERGE: CPT | Performed by: NURSE PRACTITIONER

## 2021-04-02 PROCEDURE — 99214 OFFICE O/P EST MOD 30 MIN: CPT | Performed by: NURSE PRACTITIONER

## 2021-04-02 RX ORDER — ROSUVASTATIN CALCIUM 20 MG/1
20 TABLET, COATED ORAL NIGHTLY
Qty: 90 TABLET | Refills: 3 | Status: CANCELLED | OUTPATIENT
Start: 2021-04-02

## 2021-04-02 RX ORDER — CLOPIDOGREL BISULFATE 75 MG/1
75 TABLET ORAL DAILY
Qty: 90 TABLET | Refills: 3 | Status: CANCELLED | OUTPATIENT
Start: 2021-04-02

## 2021-04-02 NOTE — PROGRESS NOTES
Garrett Anderson is a 79year old female. Patient presents with: Follow - Up: cath rt writh radial   Hypertension  CAD: PTCA w/ stent 3/2021    HPI:   Patient comes in today for follow-up.  She sees Dr. Mansi Yarbrough has previous history of nonobstructive coronary dise Take  by mouth. • Multiple Vitamin (MULTIVITAMINS) Oral Cap Take  by mouth. • omega-3 fatty acids (FISH OIL) 1000 MG Oral Cap Take 1 capsule by mouth daily.         Past Medical History:   Diagnosis Date   • Breast cancer [facility])    • Coronary atheros her to go to cardiac rehab. She will see Dr. Kasey Perrin next week for her regular scheduled appointment     The patient indicates understanding of these issues and agrees to the plan. The patient is asked to return in 1 week.       Samuel Llanes, APRN  4/2/2

## 2021-04-05 ENCOUNTER — OFFICE VISIT (OUTPATIENT)
Dept: CARDIOLOGY CLINIC | Facility: CLINIC | Age: 81
End: 2021-04-05
Payer: MEDICARE

## 2021-04-05 VITALS
BODY MASS INDEX: 35 KG/M2 | HEIGHT: 64 IN | HEART RATE: 73 BPM | DIASTOLIC BLOOD PRESSURE: 73 MMHG | SYSTOLIC BLOOD PRESSURE: 136 MMHG | WEIGHT: 205 LBS

## 2021-04-05 DIAGNOSIS — E78.2 MIXED HYPERLIPIDEMIA: ICD-10-CM

## 2021-04-05 DIAGNOSIS — I25.10 CORONARY ARTERY DISEASE INVOLVING NATIVE CORONARY ARTERY OF NATIVE HEART WITHOUT ANGINA PECTORIS: Primary | ICD-10-CM

## 2021-04-05 PROCEDURE — 99214 OFFICE O/P EST MOD 30 MIN: CPT | Performed by: INTERNAL MEDICINE

## 2021-04-05 RX ORDER — CLOPIDOGREL BISULFATE 75 MG/1
75 TABLET ORAL DAILY
Qty: 90 TABLET | Refills: 3 | Status: SHIPPED | OUTPATIENT
Start: 2021-04-05 | End: 2021-05-17

## 2021-04-05 RX ORDER — ROSUVASTATIN CALCIUM 20 MG/1
20 TABLET, COATED ORAL NIGHTLY
Qty: 90 TABLET | Refills: 3 | Status: SHIPPED | OUTPATIENT
Start: 2021-04-05 | End: 2021-05-17

## 2021-04-05 NOTE — PROGRESS NOTES
Name:  Layne Page  :     Date of consultation:   2021    Referring physician: Pj Israel MD    Reason for Visit:  Patient presents with: Follow - Up:  Annual  CAD  Hypertension      HPI:   Patient is here for follow-up after stenting Relation Age of Onset   • Cancer Father         Cancer-brain   • Other (Other) Mother    • Cancer Sister         Cancer-colon   • Breast Cancer Self 73        lt breast      Past Medical History:   Diagnosis Date   • Breast cancer [facility])    • Coronary ather ANIONGAP 3 03/24/2021    GFRNAA 63 03/24/2021    GFRAA 72 03/24/2021    CA 10.3 (H) 03/24/2021    OSMOCALC 295 03/24/2021    ALKPHO 77 12/24/2020    AST 18 12/24/2020    ALT 23 12/24/2020    ALKPHOS 58 03/21/2015    BILT 0.5 12/24/2020    TP 7.2 12/24/2020

## 2021-04-19 NOTE — TELEPHONE ENCOUNTER
•  LISINOPRIL-HYDROCHLOROTHIAZIDE 20-25 MG Oral Tab, TAKE 1 TABLET ONCE DAILY, Disp: 90 tablet, Rfl: 1

## 2021-04-21 RX ORDER — LISINOPRIL AND HYDROCHLOROTHIAZIDE 25; 20 MG/1; MG/1
1 TABLET ORAL DAILY
Qty: 90 TABLET | Refills: 1 | Status: SHIPPED | OUTPATIENT
Start: 2021-04-21 | End: 2021-07-19

## 2021-04-28 ENCOUNTER — TELEPHONE (OUTPATIENT)
Dept: CARDIOLOGY CLINIC | Facility: CLINIC | Age: 81
End: 2021-04-28

## 2021-05-03 ENCOUNTER — CARDPULM VISIT (OUTPATIENT)
Dept: CARDIAC REHAB | Facility: HOSPITAL | Age: 81
End: 2021-05-03
Attending: INTERNAL MEDICINE
Payer: MEDICARE

## 2021-05-03 ENCOUNTER — ORDER TRANSCRIPTION (OUTPATIENT)
Dept: CARDIAC REHAB | Facility: HOSPITAL | Age: 81
End: 2021-05-03

## 2021-05-03 DIAGNOSIS — Z98.61 S/P PTCA (PERCUTANEOUS TRANSLUMINAL CORONARY ANGIOPLASTY): Primary | ICD-10-CM

## 2021-05-10 ENCOUNTER — CARDPULM VISIT (OUTPATIENT)
Dept: CARDIAC REHAB | Facility: HOSPITAL | Age: 81
End: 2021-05-10
Attending: INTERNAL MEDICINE
Payer: MEDICARE

## 2021-05-10 PROCEDURE — 93798 PHYS/QHP OP CAR RHAB W/ECG: CPT

## 2021-05-12 ENCOUNTER — CARDPULM VISIT (OUTPATIENT)
Dept: CARDIAC REHAB | Facility: HOSPITAL | Age: 81
End: 2021-05-12
Attending: INTERNAL MEDICINE
Payer: MEDICARE

## 2021-05-12 PROCEDURE — 93798 PHYS/QHP OP CAR RHAB W/ECG: CPT

## 2021-05-14 ENCOUNTER — CARDPULM VISIT (OUTPATIENT)
Dept: CARDIAC REHAB | Facility: HOSPITAL | Age: 81
End: 2021-05-14
Attending: INTERNAL MEDICINE
Payer: MEDICARE

## 2021-05-14 PROCEDURE — 93798 PHYS/QHP OP CAR RHAB W/ECG: CPT

## 2021-05-17 ENCOUNTER — CARDPULM VISIT (OUTPATIENT)
Dept: CARDIAC REHAB | Facility: HOSPITAL | Age: 81
End: 2021-05-17
Attending: INTERNAL MEDICINE
Payer: MEDICARE

## 2021-05-17 ENCOUNTER — TELEPHONE (OUTPATIENT)
Dept: CARDIOLOGY CLINIC | Facility: CLINIC | Age: 81
End: 2021-05-17

## 2021-05-17 PROCEDURE — 93798 PHYS/QHP OP CAR RHAB W/ECG: CPT

## 2021-05-17 RX ORDER — CLOPIDOGREL BISULFATE 75 MG/1
75 TABLET ORAL DAILY
Qty: 90 TABLET | Refills: 3 | Status: SHIPPED | OUTPATIENT
Start: 2021-05-17

## 2021-05-17 RX ORDER — ROSUVASTATIN CALCIUM 20 MG/1
20 TABLET, COATED ORAL NIGHTLY
Qty: 90 TABLET | Refills: 3 | Status: SHIPPED | OUTPATIENT
Start: 2021-05-17

## 2021-05-17 NOTE — TELEPHONE ENCOUNTER
Refills for rosuvastatin and clopidogrel previously sent 4/5/21 to [facility]. For 1 yr supply.  Refill sent to [facility] at patient's request.

## 2021-05-17 NOTE — TELEPHONE ENCOUNTER
Pt called in to get refills on medication plavix and Crestor.  She would like it sent to pharmacy [facility] [address], [address], [phone], [phone] going forward for 3 mo

## 2021-05-19 ENCOUNTER — CARDPULM VISIT (OUTPATIENT)
Dept: CARDIAC REHAB | Facility: HOSPITAL | Age: 81
End: 2021-05-19
Attending: INTERNAL MEDICINE
Payer: MEDICARE

## 2021-05-19 PROCEDURE — 93798 PHYS/QHP OP CAR RHAB W/ECG: CPT

## 2021-05-21 ENCOUNTER — CARDPULM VISIT (OUTPATIENT)
Dept: CARDIAC REHAB | Facility: HOSPITAL | Age: 81
End: 2021-05-21
Attending: INTERNAL MEDICINE
Payer: MEDICARE

## 2021-05-21 PROCEDURE — 93798 PHYS/QHP OP CAR RHAB W/ECG: CPT

## 2021-05-24 ENCOUNTER — CARDPULM VISIT (OUTPATIENT)
Dept: CARDIAC REHAB | Facility: HOSPITAL | Age: 81
End: 2021-05-24
Attending: INTERNAL MEDICINE
Payer: MEDICARE

## 2021-05-24 PROCEDURE — 93798 PHYS/QHP OP CAR RHAB W/ECG: CPT

## 2021-05-26 ENCOUNTER — CARDPULM VISIT (OUTPATIENT)
Dept: CARDIAC REHAB | Facility: HOSPITAL | Age: 81
End: 2021-05-26
Attending: INTERNAL MEDICINE
Payer: MEDICARE

## 2021-05-26 PROCEDURE — 93798 PHYS/QHP OP CAR RHAB W/ECG: CPT

## 2021-05-28 ENCOUNTER — CARDPULM VISIT (OUTPATIENT)
Dept: CARDIAC REHAB | Facility: HOSPITAL | Age: 81
End: 2021-05-28
Attending: INTERNAL MEDICINE
Payer: MEDICARE

## 2021-05-28 PROCEDURE — 93798 PHYS/QHP OP CAR RHAB W/ECG: CPT

## 2021-06-01 ENCOUNTER — CARDPULM VISIT (OUTPATIENT)
Dept: CARDIAC REHAB | Facility: HOSPITAL | Age: 81
End: 2021-06-01
Attending: INTERNAL MEDICINE
Payer: MEDICARE

## 2021-06-01 PROCEDURE — 93798 PHYS/QHP OP CAR RHAB W/ECG: CPT

## 2021-06-02 ENCOUNTER — CARDPULM VISIT (OUTPATIENT)
Dept: CARDIAC REHAB | Facility: HOSPITAL | Age: 81
End: 2021-06-02
Attending: INTERNAL MEDICINE
Payer: MEDICARE

## 2021-06-02 PROCEDURE — 93798 PHYS/QHP OP CAR RHAB W/ECG: CPT

## 2021-06-07 ENCOUNTER — CARDPULM VISIT (OUTPATIENT)
Dept: CARDIAC REHAB | Facility: HOSPITAL | Age: 81
End: 2021-06-07
Attending: INTERNAL MEDICINE
Payer: MEDICARE

## 2021-06-07 PROCEDURE — 93798 PHYS/QHP OP CAR RHAB W/ECG: CPT

## 2021-06-09 ENCOUNTER — CARDPULM VISIT (OUTPATIENT)
Dept: CARDIAC REHAB | Facility: HOSPITAL | Age: 81
End: 2021-06-09
Attending: INTERNAL MEDICINE
Payer: MEDICARE

## 2021-06-09 PROCEDURE — 93798 PHYS/QHP OP CAR RHAB W/ECG: CPT

## 2021-06-11 ENCOUNTER — CARDPULM VISIT (OUTPATIENT)
Dept: CARDIAC REHAB | Facility: HOSPITAL | Age: 81
End: 2021-06-11
Attending: INTERNAL MEDICINE
Payer: MEDICARE

## 2021-06-11 PROCEDURE — 93798 PHYS/QHP OP CAR RHAB W/ECG: CPT

## 2021-06-14 ENCOUNTER — CARDPULM VISIT (OUTPATIENT)
Dept: CARDIAC REHAB | Facility: HOSPITAL | Age: 81
End: 2021-06-14
Attending: INTERNAL MEDICINE
Payer: MEDICARE

## 2021-06-14 PROCEDURE — 93798 PHYS/QHP OP CAR RHAB W/ECG: CPT

## 2021-06-16 ENCOUNTER — CARDPULM VISIT (OUTPATIENT)
Dept: CARDIAC REHAB | Facility: HOSPITAL | Age: 81
End: 2021-06-16
Attending: INTERNAL MEDICINE
Payer: MEDICARE

## 2021-06-16 PROCEDURE — 93798 PHYS/QHP OP CAR RHAB W/ECG: CPT

## 2021-06-17 ENCOUNTER — TELEPHONE (OUTPATIENT)
Dept: GASTROENTEROLOGY | Facility: CLINIC | Age: 81
End: 2021-06-17

## 2021-06-17 DIAGNOSIS — Z86.010 PERSONAL HISTORY OF COLONIC POLYPS: Primary | ICD-10-CM

## 2021-06-17 DIAGNOSIS — Z80.0 FAMILY HISTORY OF COLON CANCER: ICD-10-CM

## 2021-06-17 NOTE — TELEPHONE ENCOUNTER
Patient had stent to RCA and circ on 3/26/21 and needs to be on Plavix for 1 year, if routine colonoscopy she should wait for 1 year from stent placement, if required sooner we will review with   Called patient, she told me this is  5 year follow up

## 2021-06-17 NOTE — TELEPHONE ENCOUNTER
Pt calling to cancel CLN 6/22/21. Pt states based on cardiologist recommendation she is unable to complete CLN until next year.

## 2021-06-17 NOTE — TELEPHONE ENCOUNTER
Patient called in stating she has procedure on the 22nd and wondering if she can take Plavix medication.  Please follow up

## 2021-06-17 NOTE — TELEPHONE ENCOUNTER
Dr. Gill Price RN's-    Patient scheduled for colonoscopy with Dr. Laura Hamilton 6/22/21. Please advise on cardiac orders prior to procedure. Currently on Plavix. Thank you!

## 2021-06-17 NOTE — TELEPHONE ENCOUNTER
Dr. Kelly porter! Pt has been canceled. Please see other TE from 6/17/21 regarding notes from cardiology. Thank you!

## 2021-06-17 NOTE — TELEPHONE ENCOUNTER
Per pt, procedure needs to wait until 2022 per her cardiologist & recent stents. Canceled in [address] I sent an electronic CANCEL request to Endo Scheduling and received a confirmation today.           Canceled for:  Colonoscopy - 63988               Pro

## 2021-06-18 ENCOUNTER — CARDPULM VISIT (OUTPATIENT)
Dept: CARDIAC REHAB | Facility: HOSPITAL | Age: 81
End: 2021-06-18
Attending: INTERNAL MEDICINE
Payer: MEDICARE

## 2021-06-18 PROCEDURE — 93798 PHYS/QHP OP CAR RHAB W/ECG: CPT

## 2021-06-21 ENCOUNTER — CARDPULM VISIT (OUTPATIENT)
Dept: CARDIAC REHAB | Facility: HOSPITAL | Age: 81
End: 2021-06-21
Attending: INTERNAL MEDICINE
Payer: MEDICARE

## 2021-06-21 PROCEDURE — 93798 PHYS/QHP OP CAR RHAB W/ECG: CPT

## 2021-06-23 ENCOUNTER — CARDPULM VISIT (OUTPATIENT)
Dept: CARDIAC REHAB | Facility: HOSPITAL | Age: 81
End: 2021-06-23
Attending: INTERNAL MEDICINE
Payer: MEDICARE

## 2021-06-23 PROCEDURE — 93798 PHYS/QHP OP CAR RHAB W/ECG: CPT

## 2021-06-28 ENCOUNTER — CARDPULM VISIT (OUTPATIENT)
Dept: CARDIAC REHAB | Facility: HOSPITAL | Age: 81
End: 2021-06-28
Attending: INTERNAL MEDICINE
Payer: MEDICARE

## 2021-06-28 PROCEDURE — 93798 PHYS/QHP OP CAR RHAB W/ECG: CPT

## 2021-06-30 ENCOUNTER — CARDPULM VISIT (OUTPATIENT)
Dept: CARDIAC REHAB | Facility: HOSPITAL | Age: 81
End: 2021-06-30
Attending: INTERNAL MEDICINE
Payer: MEDICARE

## 2021-06-30 PROCEDURE — 93798 PHYS/QHP OP CAR RHAB W/ECG: CPT

## 2021-07-02 ENCOUNTER — CARDPULM VISIT (OUTPATIENT)
Dept: CARDIAC REHAB | Facility: HOSPITAL | Age: 81
End: 2021-07-02
Attending: INTERNAL MEDICINE
Payer: MEDICARE

## 2021-07-02 PROCEDURE — 93798 PHYS/QHP OP CAR RHAB W/ECG: CPT

## 2021-07-06 ENCOUNTER — CARDPULM VISIT (OUTPATIENT)
Dept: CARDIAC REHAB | Facility: HOSPITAL | Age: 81
End: 2021-07-06
Attending: INTERNAL MEDICINE
Payer: MEDICARE

## 2021-07-06 PROCEDURE — 93798 PHYS/QHP OP CAR RHAB W/ECG: CPT

## 2021-07-09 ENCOUNTER — CARDPULM VISIT (OUTPATIENT)
Dept: CARDIAC REHAB | Facility: HOSPITAL | Age: 81
End: 2021-07-09
Attending: INTERNAL MEDICINE
Payer: MEDICARE

## 2021-07-09 PROCEDURE — 93798 PHYS/QHP OP CAR RHAB W/ECG: CPT

## 2021-07-12 ENCOUNTER — CARDPULM VISIT (OUTPATIENT)
Dept: CARDIAC REHAB | Facility: HOSPITAL | Age: 81
End: 2021-07-12
Attending: INTERNAL MEDICINE
Payer: MEDICARE

## 2021-07-12 PROCEDURE — 93798 PHYS/QHP OP CAR RHAB W/ECG: CPT

## 2021-07-14 ENCOUNTER — CARDPULM VISIT (OUTPATIENT)
Dept: CARDIAC REHAB | Facility: HOSPITAL | Age: 81
End: 2021-07-14
Attending: INTERNAL MEDICINE
Payer: MEDICARE

## 2021-07-14 PROCEDURE — 93798 PHYS/QHP OP CAR RHAB W/ECG: CPT

## 2021-07-16 ENCOUNTER — CARDPULM VISIT (OUTPATIENT)
Dept: CARDIAC REHAB | Facility: HOSPITAL | Age: 81
End: 2021-07-16
Attending: INTERNAL MEDICINE
Payer: MEDICARE

## 2021-07-16 PROCEDURE — 93798 PHYS/QHP OP CAR RHAB W/ECG: CPT

## 2021-07-19 ENCOUNTER — CARDPULM VISIT (OUTPATIENT)
Dept: CARDIAC REHAB | Facility: HOSPITAL | Age: 81
End: 2021-07-19
Attending: INTERNAL MEDICINE
Payer: MEDICARE

## 2021-07-19 ENCOUNTER — TELEPHONE (OUTPATIENT)
Dept: INTERNAL MEDICINE CLINIC | Facility: CLINIC | Age: 81
End: 2021-07-19

## 2021-07-19 PROCEDURE — 93798 PHYS/QHP OP CAR RHAB W/ECG: CPT

## 2021-07-19 RX ORDER — LISINOPRIL AND HYDROCHLOROTHIAZIDE 25; 20 MG/1; MG/1
1 TABLET ORAL DAILY
Qty: 90 TABLET | Refills: 0 | Status: SHIPPED | OUTPATIENT
Start: 2021-07-19 | End: 2021-10-25

## 2021-07-19 NOTE — TELEPHONE ENCOUNTER
Patient called and she need her refill to go to [facility] [address] [place] [address], [address] [phone], [phone]   Outpatient Medication Detail     Disp Refills Start End    Lisinopril-hydroCHLOROthiazide 20-25 MG Oral

## 2021-07-21 ENCOUNTER — CARDPULM VISIT (OUTPATIENT)
Dept: CARDIAC REHAB | Facility: HOSPITAL | Age: 81
End: 2021-07-21
Attending: INTERNAL MEDICINE
Payer: MEDICARE

## 2021-07-21 PROCEDURE — 93798 PHYS/QHP OP CAR RHAB W/ECG: CPT

## 2021-07-23 ENCOUNTER — CARDPULM VISIT (OUTPATIENT)
Dept: CARDIAC REHAB | Facility: HOSPITAL | Age: 81
End: 2021-07-23
Attending: INTERNAL MEDICINE
Payer: MEDICARE

## 2021-07-23 ENCOUNTER — TELEPHONE (OUTPATIENT)
Dept: CARDIOLOGY CLINIC | Facility: CLINIC | Age: 81
End: 2021-07-23

## 2021-07-23 PROCEDURE — 93798 PHYS/QHP OP CAR RHAB W/ECG: CPT

## 2021-07-23 NOTE — TELEPHONE ENCOUNTER
[facility] ONEILMOHSEN, she is asking which medications to hold before the stress test. Advised her someone for hospital will call her the night before with instructions.  On her order  She had instructions to call MD office which medications to hold, advised her to

## 2021-07-24 ENCOUNTER — LAB ENCOUNTER (OUTPATIENT)
Dept: LAB | Facility: HOSPITAL | Age: 81
End: 2021-07-24
Attending: INTERNAL MEDICINE
Payer: MEDICARE

## 2021-07-24 DIAGNOSIS — I25.10 CORONARY ARTERY DISEASE INVOLVING NATIVE CORONARY ARTERY OF NATIVE HEART WITHOUT ANGINA PECTORIS: ICD-10-CM

## 2021-07-24 LAB — SARS-COV-2 RNA RESP QL NAA+PROBE: NOT DETECTED

## 2021-07-26 ENCOUNTER — CARDPULM VISIT (OUTPATIENT)
Dept: CARDIAC REHAB | Facility: HOSPITAL | Age: 81
End: 2021-07-26
Attending: INTERNAL MEDICINE
Payer: MEDICARE

## 2021-07-26 PROCEDURE — 93798 PHYS/QHP OP CAR RHAB W/ECG: CPT

## 2021-07-27 ENCOUNTER — HOSPITAL ENCOUNTER (OUTPATIENT)
Dept: NUCLEAR MEDICINE | Facility: HOSPITAL | Age: 81
Discharge: HOME | End: 2021-07-27
Attending: INTERNAL MEDICINE
Payer: MEDICARE

## 2021-07-27 ENCOUNTER — HOSPITAL ENCOUNTER (OUTPATIENT)
Dept: CV DIAGNOSTICS | Facility: HOSPITAL | Age: 81
Discharge: HOME | End: 2021-07-27
Attending: INTERNAL MEDICINE
Payer: MEDICARE

## 2021-07-27 DIAGNOSIS — I25.10 CORONARY ARTERY DISEASE INVOLVING NATIVE CORONARY ARTERY OF NATIVE HEART WITHOUT ANGINA PECTORIS: ICD-10-CM

## 2021-07-27 PROCEDURE — 78452 HT MUSCLE IMAGE SPECT MULT: CPT | Performed by: INTERNAL MEDICINE

## 2021-07-27 PROCEDURE — 93018 CV STRESS TEST I&R ONLY: CPT | Performed by: INTERNAL MEDICINE

## 2021-07-27 PROCEDURE — 93016 CV STRESS TEST SUPVJ ONLY: CPT | Performed by: INTERNAL MEDICINE

## 2021-07-27 PROCEDURE — 93017 CV STRESS TEST TRACING ONLY: CPT | Performed by: INTERNAL MEDICINE

## 2021-07-28 ENCOUNTER — CARDPULM VISIT (OUTPATIENT)
Dept: CARDIAC REHAB | Facility: HOSPITAL | Age: 81
End: 2021-07-28
Attending: INTERNAL MEDICINE
Payer: MEDICARE

## 2021-07-28 PROCEDURE — 93798 PHYS/QHP OP CAR RHAB W/ECG: CPT

## 2021-07-30 ENCOUNTER — CARDPULM VISIT (OUTPATIENT)
Dept: CARDIAC REHAB | Facility: HOSPITAL | Age: 81
End: 2021-07-30
Attending: INTERNAL MEDICINE
Payer: MEDICARE

## 2021-07-30 PROCEDURE — 93798 PHYS/QHP OP CAR RHAB W/ECG: CPT

## 2021-08-02 ENCOUNTER — OFFICE VISIT (OUTPATIENT)
Dept: CARDIOLOGY CLINIC | Facility: CLINIC | Age: 81
End: 2021-08-02
Payer: MEDICARE

## 2021-08-02 ENCOUNTER — CARDPULM VISIT (OUTPATIENT)
Dept: CARDIAC REHAB | Facility: HOSPITAL | Age: 81
End: 2021-08-02
Attending: INTERNAL MEDICINE
Payer: MEDICARE

## 2021-08-02 VITALS
SYSTOLIC BLOOD PRESSURE: 133 MMHG | DIASTOLIC BLOOD PRESSURE: 78 MMHG | BODY MASS INDEX: 34.66 KG/M2 | WEIGHT: 203 LBS | HEIGHT: 64 IN | HEART RATE: 57 BPM

## 2021-08-02 DIAGNOSIS — I25.10 CORONARY ARTERY DISEASE INVOLVING NATIVE CORONARY ARTERY OF NATIVE HEART WITHOUT ANGINA PECTORIS: Primary | ICD-10-CM

## 2021-08-02 DIAGNOSIS — E78.2 MIXED HYPERLIPIDEMIA: ICD-10-CM

## 2021-08-02 PROCEDURE — 99214 OFFICE O/P EST MOD 30 MIN: CPT | Performed by: INTERNAL MEDICINE

## 2021-08-02 PROCEDURE — 93798 PHYS/QHP OP CAR RHAB W/ECG: CPT

## 2021-08-02 NOTE — PROGRESS NOTES
Name:  Lorenzo Monroy  :     Date of consultation:   2021    Referring physician: Nadiya Bravo MD    Reason for Visit:  Patient presents with:   Follow - Up: L Heart Cath 21  Test Results: stress test 21    CAD      HPI:   Thania Onset   • Cancer Father         Cancer-brain   • Other (Other) Mother    • Cancer Sister         Cancer-colon   • Breast Cancer Self 73        lt breast      Past Medical History:   Diagnosis Date   • Breast cancer [facility])    • Coronary atherosclerosis    • 03/24/2021    GFRNAA 63 03/24/2021    GFRAA 72 03/24/2021    CA 10.3 (H) 03/24/2021    OSMOCALC 295 03/24/2021    ALKPHO 77 12/24/2020    AST 18 12/24/2020    ALT 23 12/24/2020    ALKPHOS 58 03/21/2015    BILT 0.5 12/24/2020    TP 7.2 12/24/2020    ALB 3.5

## 2021-08-04 ENCOUNTER — CARDPULM VISIT (OUTPATIENT)
Dept: CARDIAC REHAB | Facility: HOSPITAL | Age: 81
End: 2021-08-04
Attending: INTERNAL MEDICINE
Payer: MEDICARE

## 2021-08-04 PROCEDURE — 93798 PHYS/QHP OP CAR RHAB W/ECG: CPT

## 2021-08-06 ENCOUNTER — CARDPULM VISIT (OUTPATIENT)
Dept: CARDIAC REHAB | Facility: HOSPITAL | Age: 81
End: 2021-08-06
Attending: INTERNAL MEDICINE
Payer: MEDICARE

## 2021-08-06 PROCEDURE — 93798 PHYS/QHP OP CAR RHAB W/ECG: CPT

## 2021-08-09 ENCOUNTER — APPOINTMENT (OUTPATIENT)
Dept: CARDIAC REHAB | Facility: HOSPITAL | Age: 81
End: 2021-08-09
Attending: INTERNAL MEDICINE
Payer: MEDICARE

## 2021-10-25 RX ORDER — LISINOPRIL AND HYDROCHLOROTHIAZIDE 25; 20 MG/1; MG/1
1 TABLET ORAL DAILY
Qty: 90 TABLET | Refills: 1 | Status: SHIPPED | OUTPATIENT
Start: 2021-10-25

## 2021-10-25 NOTE — TELEPHONE ENCOUNTER
Refill passed per [address] [place] protocol.    Requested Prescriptions   Pending Prescriptions Disp Refills    LISINOPRIL-HYDROCHLOROTHIAZIDE 20-25 MG Oral Tab [Pharmacy Med Name: LISINOP/HCTZ TAB 20-25MG] 90 tablet 0     Sig: TAKE 1 TABLET DAILY        Hyp

## 2022-01-14 ENCOUNTER — TELEPHONE (OUTPATIENT)
Dept: GASTROENTEROLOGY | Facility: CLINIC | Age: 82
End: 2022-01-14

## 2022-01-14 DIAGNOSIS — Z86.010 PERSONAL HISTORY OF COLONIC POLYPS: Primary | ICD-10-CM

## 2022-01-14 DIAGNOSIS — Z80.0 FAMILY HISTORY OF COLON CANCER: ICD-10-CM

## 2022-01-14 NOTE — TELEPHONE ENCOUNTER
Pt had to cancel colon last June because she had an angioplasty, no new issues, she is calling to reschedule.

## 2022-01-21 NOTE — TELEPHONE ENCOUNTER
Scheduled for:  Colonoscopy - 53959               Provider Name:  Dr. Lassiter  Date:  6/22/22  Dirk Rivera  Time:  11:15 am (pt is aware to arrive at 10:15 am)  Prep:  Suprep, Prep instructions were given to pt over the phone, pt ve

## 2022-01-22 RX ORDER — SODIUM, POTASSIUM,MAG SULFATES 17.5-3.13G
SOLUTION, RECONSTITUTED, ORAL ORAL
Qty: 1 EACH | Refills: 0 | Status: SHIPPED | OUTPATIENT
Start: 2022-01-22

## 2022-02-01 ENCOUNTER — LAB ENCOUNTER (OUTPATIENT)
Dept: LAB | Facility: HOSPITAL | Age: 82
End: 2022-02-01
Attending: INTERNAL MEDICINE
Payer: MEDICARE

## 2022-02-01 DIAGNOSIS — I25.84 CORONARY ARTERY CALCIFICATION: ICD-10-CM

## 2022-02-01 DIAGNOSIS — I25.10 CORONARY ARTERY CALCIFICATION: ICD-10-CM

## 2022-02-01 DIAGNOSIS — E78.2 MIXED HYPERLIPIDEMIA: ICD-10-CM

## 2022-02-01 LAB
ALT SERPL-CCNC: 22 U/L
ANION GAP SERPL CALC-SCNC: 5 MMOL/L (ref 0–18)
AST SERPL-CCNC: 17 U/L (ref 15–37)
BUN BLD-MCNC: 14 MG/DL (ref 7–18)
BUN/CREAT SERPL: 17.3 (ref 10–20)
CHLORIDE SERPL-SCNC: 109 MMOL/L (ref 98–112)
CHOLEST SERPL-MCNC: 145 MG/DL (ref ?–200)
CO2 SERPL-SCNC: 29 MMOL/L (ref 21–32)
CREAT BLD-MCNC: 0.81 MG/DL
DEPRECATED RDW RBC AUTO: 43.3 FL (ref 35.1–46.3)
ERYTHROCYTE [DISTWIDTH] IN BLOOD BY AUTOMATED COUNT: 12.6 % (ref 11–15)
FASTING PATIENT LIPID ANSWER: YES
FASTING STATUS PATIENT QL REPORTED: YES
GLUCOSE BLD-MCNC: 85 MG/DL (ref 70–99)
HCT VFR BLD AUTO: 41.2 %
HDLC SERPL-MCNC: 56 MG/DL (ref 40–59)
HGB BLD-MCNC: 13.1 G/DL
LDLC SERPL CALC-MCNC: 65 MG/DL (ref ?–100)
MCH RBC QN AUTO: 29.7 PG (ref 26–34)
MCHC RBC AUTO-ENTMCNC: 31.8 G/DL (ref 31–37)
MCV RBC AUTO: 93.4 FL
NONHDLC SERPL-MCNC: 89 MG/DL (ref ?–130)
OSMOLALITY SERPL CALC.SUM OF ELEC: 296 MOSM/KG (ref 275–295)
PLATELET # BLD AUTO: 260 10(3)UL (ref 150–450)
POTASSIUM SERPL-SCNC: 4.1 MMOL/L (ref 3.5–5.1)
RBC # BLD AUTO: 4.41 X10(6)UL
SODIUM SERPL-SCNC: 143 MMOL/L (ref 136–145)
TRIGL SERPL-MCNC: 142 MG/DL (ref 30–149)
VLDLC SERPL CALC-MCNC: 21 MG/DL (ref 0–30)
WBC # BLD AUTO: 6.3 X10(3) UL (ref 4–11)

## 2022-02-01 PROCEDURE — 84460 ALANINE AMINO (ALT) (SGPT): CPT

## 2022-02-01 PROCEDURE — 80048 BASIC METABOLIC PNL TOTAL CA: CPT

## 2022-02-01 PROCEDURE — 36415 COLL VENOUS BLD VENIPUNCTURE: CPT

## 2022-02-01 PROCEDURE — 84450 TRANSFERASE (AST) (SGOT): CPT

## 2022-02-01 PROCEDURE — 85027 COMPLETE CBC AUTOMATED: CPT

## 2022-02-01 PROCEDURE — 80061 LIPID PANEL: CPT

## 2022-02-08 ENCOUNTER — OFFICE VISIT (OUTPATIENT)
Dept: INTERNAL MEDICINE CLINIC | Facility: CLINIC | Age: 82
End: 2022-02-08
Payer: MEDICARE

## 2022-02-08 VITALS
TEMPERATURE: 98 F | DIASTOLIC BLOOD PRESSURE: 78 MMHG | SYSTOLIC BLOOD PRESSURE: 128 MMHG | HEIGHT: 64 IN | HEART RATE: 70 BPM | WEIGHT: 195 LBS | RESPIRATION RATE: 18 BRPM | BODY MASS INDEX: 33.29 KG/M2

## 2022-02-08 DIAGNOSIS — I65.23 CAROTID ATHEROSCLEROSIS, BILATERAL: ICD-10-CM

## 2022-02-08 DIAGNOSIS — Z00.00 ENCOUNTER FOR ANNUAL HEALTH EXAMINATION: ICD-10-CM

## 2022-02-08 DIAGNOSIS — I25.10 CORONARY ARTERY DISEASE INVOLVING NATIVE CORONARY ARTERY OF NATIVE HEART WITHOUT ANGINA PECTORIS: ICD-10-CM

## 2022-02-08 DIAGNOSIS — Z86.010 HISTORY OF COLON POLYPS: ICD-10-CM

## 2022-02-08 DIAGNOSIS — I10 PRIMARY HYPERTENSION: ICD-10-CM

## 2022-02-08 DIAGNOSIS — Z00.00 MEDICARE ANNUAL WELLNESS VISIT, SUBSEQUENT: Primary | ICD-10-CM

## 2022-02-08 DIAGNOSIS — Z78.0 MENOPAUSE: ICD-10-CM

## 2022-02-08 DIAGNOSIS — E78.2 MIXED HYPERLIPIDEMIA: ICD-10-CM

## 2022-02-08 DIAGNOSIS — Z85.3 HISTORY OF BREAST CANCER: ICD-10-CM

## 2022-02-08 DIAGNOSIS — I73.9 CLAUDICATION (HCC): ICD-10-CM

## 2022-02-08 DIAGNOSIS — R31.21 ASYMPTOMATIC MICROSCOPIC HEMATURIA: ICD-10-CM

## 2022-02-08 DIAGNOSIS — Z92.89 HX OF SCREENING MAMMOGRAPHY: ICD-10-CM

## 2022-02-08 DIAGNOSIS — Z12.31 VISIT FOR SCREENING MAMMOGRAM: ICD-10-CM

## 2022-02-08 PROCEDURE — G0447 BEHAVIOR COUNSEL OBESITY 15M: HCPCS | Performed by: INTERNAL MEDICINE

## 2022-02-08 PROCEDURE — 99497 ADVNCD CARE PLAN 30 MIN: CPT | Performed by: INTERNAL MEDICINE

## 2022-02-08 PROCEDURE — G0439 PPPS, SUBSEQ VISIT: HCPCS | Performed by: INTERNAL MEDICINE

## 2022-02-08 NOTE — ASSESSMENT & PLAN NOTE
Asymptomatic microscopic hematuria but patient has been advised to recollect urine at this time but drink plenty of fluids and provided adequate sample to ascertain.

## 2022-02-08 NOTE — ASSESSMENT & PLAN NOTE
Lipid panel and liver function test have been stable, she is currently on rosuvastatin at 20 mg daily which she has tolerated well. She will need to continue to monitor for atherosclerosis.

## 2022-02-08 NOTE — ASSESSMENT & PLAN NOTE
Carotid atherosclerosis as noted on the peripheral vascular scanning last year. Repeat ultrasound of the carotids ordered.

## 2022-02-08 NOTE — ASSESSMENT & PLAN NOTE
Family history of colon cancer as well as personal history of multiple colon polyps. She is due for a colonoscopy, this had to be postponed due to her angioplasty status. This has been rescheduled for June 2022. She has not had any change in her bowel movements, blood in her stools or black stools.

## 2022-02-08 NOTE — ASSESSMENT & PLAN NOTE
Deep aching pain posterior thigh and leg usually upon walking, resolves after rest.  Tends to have recurrent symptoms which has limited walking capacity. She also has some rest pain at night around the lateral and anterior thigh and she needs to massage this area before she can fall asleep finally. No associated tingling or numbness in the leg. She has had a history of sciatica in the past but this symptom seems different. No recent injuries to the back. X-ray of the lumbar spine and arterial Doppler study of the legs ordered. We will follow-up after completion.

## 2022-02-08 NOTE — ASSESSMENT & PLAN NOTE
Wt Readings from Last 6 Encounters:  02/08/22 : 195 lb (88.5 kg)  08/02/21 : 203 lb (92.1 kg)  04/05/21 : 205 lb (93 kg)  04/02/21 : 211 lb (95.7 kg)  03/25/21 : 205 lb (93 kg)  03/22/21 : 205 lb (93 kg)  Patient has been watching her diet and has gradually lost weight. Continue to monitor for unnecessary snacks, reduce portion sizes and increase vegetables in the diet.

## 2022-02-08 NOTE — ASSESSMENT & PLAN NOTE
Patient is status post cardiac cath with intervention in March 2021. She underwent successful PCI of the RCA and circumflex with EVA stents in each. FFR negative LAD diagonal perforation. Medical management. Residual moderate LAD diagonal disease. Ejection fraction was normal.    She has completed cardiac rehab. She is currently being monitored by Dr. August Velazquez. She has been on Plavix and aspirin.

## 2022-04-04 ENCOUNTER — HOSPITAL ENCOUNTER (OUTPATIENT)
Dept: GENERAL RADIOLOGY | Facility: HOSPITAL | Age: 82
Discharge: HOME | End: 2022-04-04
Attending: INTERNAL MEDICINE
Payer: MEDICARE

## 2022-04-04 ENCOUNTER — HOSPITAL ENCOUNTER (OUTPATIENT)
Dept: BONE DENSITY | Facility: HOSPITAL | Age: 82
Discharge: HOME | End: 2022-04-04
Attending: INTERNAL MEDICINE
Payer: MEDICARE

## 2022-04-04 ENCOUNTER — HOSPITAL ENCOUNTER (OUTPATIENT)
Dept: MAMMOGRAPHY | Facility: HOSPITAL | Age: 82
Discharge: HOME | End: 2022-04-04
Attending: INTERNAL MEDICINE
Payer: MEDICARE

## 2022-04-04 DIAGNOSIS — Z12.31 VISIT FOR SCREENING MAMMOGRAM: ICD-10-CM

## 2022-04-04 DIAGNOSIS — Z78.0 MENOPAUSE: ICD-10-CM

## 2022-04-04 DIAGNOSIS — I73.9 CLAUDICATION (HCC): ICD-10-CM

## 2022-04-04 PROCEDURE — 77067 SCR MAMMO BI INCL CAD: CPT | Performed by: INTERNAL MEDICINE

## 2022-04-04 PROCEDURE — 77080 DXA BONE DENSITY AXIAL: CPT | Performed by: INTERNAL MEDICINE

## 2022-04-04 PROCEDURE — 77063 BREAST TOMOSYNTHESIS BI: CPT | Performed by: INTERNAL MEDICINE

## 2022-04-04 PROCEDURE — 72110 X-RAY EXAM L-2 SPINE 4/>VWS: CPT | Performed by: INTERNAL MEDICINE

## 2022-04-15 ENCOUNTER — HOSPITAL ENCOUNTER (OUTPATIENT)
Dept: ULTRASOUND IMAGING | Facility: HOSPITAL | Age: 82
Discharge: HOME | End: 2022-04-15
Attending: INTERNAL MEDICINE
Payer: MEDICARE

## 2022-04-15 DIAGNOSIS — I65.23 CAROTID ATHEROSCLEROSIS, BILATERAL: ICD-10-CM

## 2022-04-15 DIAGNOSIS — I73.9 CLAUDICATION (HCC): ICD-10-CM

## 2022-04-15 PROCEDURE — 93880 EXTRACRANIAL BILAT STUDY: CPT | Performed by: INTERNAL MEDICINE

## 2022-04-15 PROCEDURE — 93925 LOWER EXTREMITY STUDY: CPT | Performed by: INTERNAL MEDICINE

## 2022-06-22 ENCOUNTER — ANESTHESIA EVENT (OUTPATIENT)
Dept: ENDOSCOPY | Facility: HOSPITAL | Age: 82
End: 2022-06-22
Payer: MEDICARE

## 2022-06-22 ENCOUNTER — ANESTHESIA (OUTPATIENT)
Dept: ENDOSCOPY | Facility: HOSPITAL | Age: 82
End: 2022-06-22
Payer: MEDICARE

## 2022-06-22 ENCOUNTER — HOSPITAL ENCOUNTER (OUTPATIENT)
Facility: HOSPITAL | Age: 82
Setting detail: HOSPITAL OUTPATIENT SURGERY
Discharge: HOME | End: 2022-06-22
Attending: INTERNAL MEDICINE | Admitting: INTERNAL MEDICINE
Payer: MEDICARE

## 2022-06-22 VITALS
BODY MASS INDEX: 34.55 KG/M2 | DIASTOLIC BLOOD PRESSURE: 86 MMHG | TEMPERATURE: 98 F | WEIGHT: 195 LBS | HEART RATE: 69 BPM | RESPIRATION RATE: 20 BRPM | OXYGEN SATURATION: 96 % | SYSTOLIC BLOOD PRESSURE: 148 MMHG | HEIGHT: 63 IN

## 2022-06-22 DIAGNOSIS — Z86.010 PERSONAL HISTORY OF COLONIC POLYPS: ICD-10-CM

## 2022-06-22 DIAGNOSIS — Z80.0 FAMILY HISTORY OF COLON CANCER: ICD-10-CM

## 2022-06-22 PROCEDURE — 0DBK8ZX EXCISION OF ASCENDING COLON, VIA NATURAL OR ARTIFICIAL OPENING ENDOSCOPIC, DIAGNOSTIC: ICD-10-PCS | Performed by: INTERNAL MEDICINE

## 2022-06-22 PROCEDURE — 45385 COLONOSCOPY W/LESION REMOVAL: CPT | Performed by: INTERNAL MEDICINE

## 2022-06-22 RX ORDER — LIDOCAINE HYDROCHLORIDE 10 MG/ML
INJECTION, SOLUTION EPIDURAL; INFILTRATION; INTRACAUDAL; PERINEURAL AS NEEDED
Status: DISCONTINUED | OUTPATIENT
Start: 2022-06-22 | End: 2022-06-22 | Stop reason: SURG

## 2022-06-22 RX ORDER — SODIUM CHLORIDE, SODIUM LACTATE, POTASSIUM CHLORIDE, CALCIUM CHLORIDE 600; 310; 30; 20 MG/100ML; MG/100ML; MG/100ML; MG/100ML
INJECTION, SOLUTION INTRAVENOUS CONTINUOUS
Status: DISCONTINUED | OUTPATIENT
Start: 2022-06-22 | End: 2022-06-22

## 2022-06-22 RX ADMIN — SODIUM CHLORIDE, SODIUM LACTATE, POTASSIUM CHLORIDE, CALCIUM CHLORIDE: 600; 310; 30; 20 INJECTION, SOLUTION INTRAVENOUS at 11:46:00

## 2022-06-22 RX ADMIN — SODIUM CHLORIDE, SODIUM LACTATE, POTASSIUM CHLORIDE, CALCIUM CHLORIDE: 600; 310; 30; 20 INJECTION, SOLUTION INTRAVENOUS at 12:11:00

## 2022-06-22 RX ADMIN — LIDOCAINE HYDROCHLORIDE 50 MG: 10 INJECTION, SOLUTION EPIDURAL; INFILTRATION; INTRACAUDAL; PERINEURAL at 11:51:00

## 2022-06-22 NOTE — OPERATIVE REPORT
[facility] Endoscopy Report      Date of Procedure:  06/22/22      Preoperative Diagnosis:  1. Personal history of adenomatous colon polyps  2. Family history of colon cancer      Postoperative Diagnosis:  1. Colon polyp  2. Pancolonic diverticulosis      Procedure:    Colonoscopy with polypectomy      Surgeon:  Sal Cid M.D. Anesthesia:  Monitored anesthesia care  Cecal withdrawal time: 18 minutes  EBL:  Insignificant      Brief History: This is a 81year old female who presents for a surveillance/screening colonoscopy the setting of a history of adenomatous colon polyps and a family history of colon cancer in her sister around the age of 61. The patient's last colonoscopy was approximately 4 years prior with removal of #4 subcentimeter tubular adenomas. A 3-year surveillance/screening examination was advised. The patient has been asymptomatic from a lower gastrointestinal tract standpoint. Technique:  After informed consent, the patient was placed in the left lateral recumbent position. Digital rectal examination revealed no palpable intraluminal abnormalities. An Olympus variable stiffness 190 series HD colonoscope was inserted into the rectum and advanced under direct vision by following the lumen to the terminal ileum. The colon was examined upon withdrawal in the left lateral recumbent position. Findings:  The preparation of the colon was very good. The terminal ileum was examined for 5 cm and visually normal.  The ileocecal valve was well preserved. The visualized colonic mucosa from the cecum to the anal verge was normal with an intact vascular pattern. In the distal ascending colon there was a 3-4 mm sessile polyp which was cold snare excised and retrieved. No ongoing bleeding. There were a few tiny diverticula scattered throughout the colon and a more prominent diverticulum in the sigmoid without current signs of complication.   There were no other colonic polyps, mass lesions, vascular anomalies or signs of inflammation seen. Retroflexion in the rectum revealed no abnormalities. The procedure was well tolerated without immediate complication. Impression:  1. Diminutive ascending colon polyp  2. Uncomplicated pancolonic diverticulosis    Recommendations:  1. High-fiber diet. 2.  Follow-up biopsy results. 3.  Probable observation unless any new symptoms or signs are noted.         Koki Ken MD  6/22/2022

## 2022-06-22 NOTE — ANESTHESIA POSTPROCEDURE EVALUATION
Patient: Holland Couch    Procedure Summary     Date: 06/22/22 Room / Location: [facility] ENDOSCOPY 04 / [facility] ENDOSCOPY    Anesthesia Start: 6573 Anesthesia Stop: 9123    Procedure: COLONOSCOPY (N/A ) Diagnosis:       Personal history of colonic polyps      Family history of colon cancer      (Colon Polyp, Diverticulosis)    Surgeons: Garcia Castro MD Anesthesiologist: Jesse Cortes CRNA    Anesthesia Type: MAC ASA Status: 3          Anesthesia Type: MAC    Vitals Value Taken Time   /64 06/22/22 1219   Temp 36.0   06/22/22 1219   Pulse 71 06/22/22 1219   Resp 16 06/22/22 1219   SpO2 98 06/22/22 1219       [facility] Post Evaluation:   Patient Evaluated in PACU  Patient Participation: complete - patient participated  Level of Consciousness: awake  Pain Score: 0  Pain Management: adequate  Airway Patency:patent  Dental exam unchanged from preop  Yes    Cardiovascular Status: acceptable  Respiratory Status: acceptable  Postoperative Hydration acceptable      Justin Barcenas CRNA  6/22/2022 12:19 PM

## 2022-06-23 ENCOUNTER — TELEPHONE (OUTPATIENT)
Dept: GASTROENTEROLOGY | Facility: CLINIC | Age: 82
End: 2022-06-23

## 2022-06-24 ENCOUNTER — TELEPHONE (OUTPATIENT)
Dept: GASTROENTEROLOGY | Facility: CLINIC | Age: 82
End: 2022-06-24

## 2022-06-24 NOTE — TELEPHONE ENCOUNTER
----- Message from Coralee Phoenix, MD sent at 6/24/2022  5:42 PM CDT -----  I spoke to [facility]. She is feeling well. She had a solitary subcentimeter serrated adenoma removed. I discussed the significance. I have recommended a high-fiber diet for diverticulosis. I would not recommend a surveillance colonoscopy in light of age unless any new symptoms or signs are noted. The patient will contact us if this is the case. GI RNs: Please enter in health maintenance that a colonoscopy was performed. No recall.

## 2022-07-07 ENCOUNTER — OFFICE VISIT (OUTPATIENT)
Dept: FAMILY MEDICINE CLINIC | Facility: CLINIC | Age: 82
End: 2022-07-07
Payer: MEDICARE

## 2022-07-07 VITALS
HEART RATE: 76 BPM | BODY MASS INDEX: 34.31 KG/M2 | DIASTOLIC BLOOD PRESSURE: 69 MMHG | HEIGHT: 64 IN | WEIGHT: 201 LBS | SYSTOLIC BLOOD PRESSURE: 116 MMHG

## 2022-07-07 DIAGNOSIS — M25.552 LATERAL PAIN OF LEFT HIP: Primary | ICD-10-CM

## 2022-07-07 DIAGNOSIS — I73.9 CLAUDICATION (HCC): ICD-10-CM

## 2022-07-07 PROCEDURE — 99203 OFFICE O/P NEW LOW 30 MIN: CPT | Performed by: STUDENT IN AN ORGANIZED HEALTH CARE EDUCATION/TRAINING PROGRAM

## 2022-07-07 PROCEDURE — 1125F AMNT PAIN NOTED PAIN PRSNT: CPT | Performed by: STUDENT IN AN ORGANIZED HEALTH CARE EDUCATION/TRAINING PROGRAM

## 2022-07-07 NOTE — PATIENT INSTRUCTIONS
Aleve (Naproxen) 1 tab twice a day  OR  Advil (Ibuprofen) 2 tab twice a day  For 5 days    Monitor for clinical improvement  Plan for physical therapy and exercises  If no improvement, follow-up with Ortho

## 2022-08-26 ENCOUNTER — HOSPITAL ENCOUNTER (OUTPATIENT)
Dept: GENERAL RADIOLOGY | Facility: HOSPITAL | Age: 82
Discharge: HOME | End: 2022-08-26
Attending: ORTHOPAEDIC SURGERY
Payer: MEDICARE

## 2022-08-26 ENCOUNTER — OFFICE VISIT (OUTPATIENT)
Dept: ORTHOPEDICS CLINIC | Facility: CLINIC | Age: 82
End: 2022-08-26
Payer: MEDICARE

## 2022-08-26 VITALS — BODY MASS INDEX: 34.31 KG/M2 | HEIGHT: 64 IN | WEIGHT: 201 LBS

## 2022-08-26 DIAGNOSIS — M54.16 LUMBAR RADICULOPATHY: Primary | ICD-10-CM

## 2022-08-26 DIAGNOSIS — M25.559 HIP PAIN: ICD-10-CM

## 2022-08-26 DIAGNOSIS — M16.12 PRIMARY OSTEOARTHRITIS OF LEFT HIP: ICD-10-CM

## 2022-08-26 PROCEDURE — 1126F AMNT PAIN NOTED NONE PRSNT: CPT | Performed by: ORTHOPAEDIC SURGERY

## 2022-08-26 PROCEDURE — 73502 X-RAY EXAM HIP UNI 2-3 VIEWS: CPT | Performed by: ORTHOPAEDIC SURGERY

## 2022-08-26 PROCEDURE — 99204 OFFICE O/P NEW MOD 45 MIN: CPT | Performed by: ORTHOPAEDIC SURGERY

## 2022-08-26 RX ORDER — MELOXICAM 7.5 MG/1
7.5 TABLET ORAL DAILY
Qty: 30 TABLET | Refills: 1 | Status: SHIPPED | OUTPATIENT
Start: 2022-08-26

## 2022-08-29 ENCOUNTER — OFFICE VISIT (OUTPATIENT)
Dept: PHYSICAL THERAPY | Facility: HOSPITAL | Age: 82
End: 2022-08-29
Attending: ORTHOPAEDIC SURGERY
Payer: MEDICARE

## 2022-08-29 PROCEDURE — 97110 THERAPEUTIC EXERCISES: CPT

## 2022-08-29 PROCEDURE — 97162 PT EVAL MOD COMPLEX 30 MIN: CPT

## 2022-09-01 ENCOUNTER — OFFICE VISIT (OUTPATIENT)
Dept: PHYSICAL THERAPY | Facility: HOSPITAL | Age: 82
End: 2022-09-01
Attending: ORTHOPAEDIC SURGERY
Payer: MEDICARE

## 2022-09-01 PROCEDURE — 97140 MANUAL THERAPY 1/> REGIONS: CPT

## 2022-09-01 PROCEDURE — 97110 THERAPEUTIC EXERCISES: CPT

## 2022-09-07 ENCOUNTER — OFFICE VISIT (OUTPATIENT)
Dept: PHYSICAL THERAPY | Facility: HOSPITAL | Age: 82
End: 2022-09-07
Attending: ORTHOPAEDIC SURGERY
Payer: MEDICARE

## 2022-09-07 PROCEDURE — 97110 THERAPEUTIC EXERCISES: CPT

## 2022-09-07 PROCEDURE — 97140 MANUAL THERAPY 1/> REGIONS: CPT

## 2022-09-08 ENCOUNTER — TELEPHONE (OUTPATIENT)
Dept: ORTHOPEDICS CLINIC | Facility: CLINIC | Age: 82
End: 2022-09-08

## 2022-09-08 DIAGNOSIS — M54.16 LUMBAR RADICULOPATHY: Primary | ICD-10-CM

## 2022-09-08 DIAGNOSIS — M25.559 HIP PAIN: ICD-10-CM

## 2022-09-08 DIAGNOSIS — M16.12 PRIMARY OSTEOARTHRITIS OF LEFT HIP: ICD-10-CM

## 2022-09-08 NOTE — TELEPHONE ENCOUNTER
Left hip, groin, left lower back states having hard time walking due to weakness and pain on left side. Patient states she tolerates physical therapy just fine but after leg goes back to normal. Patient is going to [facility] September 25th wondering if you have any other recommendations.

## 2022-09-09 NOTE — TELEPHONE ENCOUNTER
Spoke with patient looks like physiatry referral was never placed. Writer placed order. Patient will try and call back.

## 2022-09-09 NOTE — TELEPHONE ENCOUNTER
Spoke to patient and relayed Dr. Seth Grief message as shown below. Patient verbalized understanding and requesting pain clinic phone number to be sent via [address] Message sent.

## 2022-09-12 ENCOUNTER — OFFICE VISIT (OUTPATIENT)
Dept: PHYSICAL THERAPY | Facility: HOSPITAL | Age: 82
End: 2022-09-12
Attending: ORTHOPAEDIC SURGERY
Payer: MEDICARE

## 2022-09-12 PROCEDURE — 97110 THERAPEUTIC EXERCISES: CPT

## 2022-09-12 PROCEDURE — 97140 MANUAL THERAPY 1/> REGIONS: CPT

## 2022-09-15 ENCOUNTER — TELEPHONE (OUTPATIENT)
Dept: ORTHOPEDICS CLINIC | Facility: CLINIC | Age: 82
End: 2022-09-15

## 2022-09-15 NOTE — TELEPHONE ENCOUNTER
Called patient states she just wants to do physical therapy and does not want to go to physiatry right now.

## 2022-09-15 NOTE — TELEPHONE ENCOUNTER
Per Markus Lakhani calling to speak to RN to clarify referral. States pt called to schedule for pain center, but order is for physiatry.  Please advise

## 2022-09-16 ENCOUNTER — OFFICE VISIT (OUTPATIENT)
Dept: PHYSICAL THERAPY | Facility: HOSPITAL | Age: 82
End: 2022-09-16
Attending: ORTHOPAEDIC SURGERY
Payer: MEDICARE

## 2022-09-16 PROCEDURE — 97140 MANUAL THERAPY 1/> REGIONS: CPT

## 2022-09-16 PROCEDURE — 97110 THERAPEUTIC EXERCISES: CPT

## 2022-09-16 NOTE — TELEPHONE ENCOUNTER
Called patient and discussed that all of the neuro and physiatrist are in the same office Explained what a physiatrist does and confirmed the phone number.  Pt. Will continue PT in [place]

## 2022-09-16 NOTE — TELEPHONE ENCOUNTER
Patient indicates she is already having physical therapy, and has had seven sessions. Patient needs clarification as to what is physiatry and why she is being referred. Please call today if possible at [phone],thanks.

## 2022-09-19 ENCOUNTER — OFFICE VISIT (OUTPATIENT)
Dept: PHYSICAL MEDICINE AND REHAB | Facility: CLINIC | Age: 82
End: 2022-09-19

## 2022-09-19 ENCOUNTER — TELEPHONE (OUTPATIENT)
Dept: PHYSICAL MEDICINE AND REHAB | Facility: CLINIC | Age: 82
End: 2022-09-19

## 2022-09-19 VITALS
DIASTOLIC BLOOD PRESSURE: 80 MMHG | WEIGHT: 199.63 LBS | HEIGHT: 64 IN | OXYGEN SATURATION: 97 % | BODY MASS INDEX: 34.08 KG/M2 | HEART RATE: 67 BPM | SYSTOLIC BLOOD PRESSURE: 158 MMHG

## 2022-09-19 DIAGNOSIS — M25.552 HIP PAIN, CHRONIC, LEFT: Primary | ICD-10-CM

## 2022-09-19 DIAGNOSIS — G89.29 HIP PAIN, CHRONIC, LEFT: Primary | ICD-10-CM

## 2022-09-19 DIAGNOSIS — M79.18 MYOFASCIAL PAIN SYNDROME OF LUMBAR SPINE: ICD-10-CM

## 2022-09-19 PROCEDURE — 99204 OFFICE O/P NEW MOD 45 MIN: CPT | Performed by: PHYSICAL MEDICINE & REHABILITATION

## 2022-09-19 RX ORDER — MELOXICAM 15 MG/1
15 TABLET ORAL DAILY
Qty: 30 TABLET | Refills: 1 | Status: SHIPPED | OUTPATIENT
Start: 2022-09-19 | End: 2022-10-19

## 2022-09-19 NOTE — PATIENT INSTRUCTIONS
1. Continue physical therapy  2. Discontinue other NSAIDS (Aleve) and start taking Meloxicam daily. 3. I have ordered an injection for your left hip, which we will schedule if needed at a future visit. 4. Follow up with me once back from vacation.

## 2022-09-19 NOTE — TELEPHONE ENCOUNTER
Per Medicare guidelines-no authorization required for Lidocaine/Kenalog injections     Left hip steroid injection with ultrasound guidance CPT 86782+    Status: Authorization is not required-Covered Benefit    Per Dr. Tee Shallow not call to schedule, she will call back when in town from [place]

## 2022-09-21 ENCOUNTER — OFFICE VISIT (OUTPATIENT)
Dept: PHYSICAL THERAPY | Facility: HOSPITAL | Age: 82
End: 2022-09-21
Attending: ORTHOPAEDIC SURGERY

## 2022-09-21 PROCEDURE — 97140 MANUAL THERAPY 1/> REGIONS: CPT

## 2022-09-21 PROCEDURE — 97110 THERAPEUTIC EXERCISES: CPT

## 2022-09-23 ENCOUNTER — OFFICE VISIT (OUTPATIENT)
Dept: PHYSICAL THERAPY | Facility: HOSPITAL | Age: 82
End: 2022-09-23
Attending: ORTHOPAEDIC SURGERY

## 2022-09-23 PROCEDURE — 97110 THERAPEUTIC EXERCISES: CPT

## 2022-09-23 PROCEDURE — 97140 MANUAL THERAPY 1/> REGIONS: CPT

## 2022-11-07 ENCOUNTER — OFFICE VISIT (OUTPATIENT)
Dept: PHYSICAL THERAPY | Facility: HOSPITAL | Age: 82
End: 2022-11-07
Attending: ORTHOPAEDIC SURGERY
Payer: MEDICARE

## 2022-11-07 PROCEDURE — 97110 THERAPEUTIC EXERCISES: CPT

## 2022-11-09 ENCOUNTER — OFFICE VISIT (OUTPATIENT)
Dept: ORTHOPEDICS CLINIC | Facility: CLINIC | Age: 82
End: 2022-11-09
Payer: MEDICARE

## 2022-11-09 DIAGNOSIS — M54.16 LUMBAR RADICULOPATHY: Primary | ICD-10-CM

## 2022-11-09 PROCEDURE — 1126F AMNT PAIN NOTED NONE PRSNT: CPT | Performed by: ORTHOPAEDIC SURGERY

## 2022-11-09 PROCEDURE — 99213 OFFICE O/P EST LOW 20 MIN: CPT | Performed by: ORTHOPAEDIC SURGERY

## 2022-11-10 ENCOUNTER — APPOINTMENT (OUTPATIENT)
Dept: PHYSICAL THERAPY | Facility: HOSPITAL | Age: 82
End: 2022-11-10
Attending: ORTHOPAEDIC SURGERY
Payer: MEDICARE

## 2022-11-14 ENCOUNTER — APPOINTMENT (OUTPATIENT)
Dept: PHYSICAL THERAPY | Facility: HOSPITAL | Age: 82
End: 2022-11-14
Attending: ORTHOPAEDIC SURGERY
Payer: MEDICARE

## 2022-11-14 ENCOUNTER — OFFICE VISIT (OUTPATIENT)
Dept: PHYSICAL MEDICINE AND REHAB | Facility: CLINIC | Age: 82
End: 2022-11-14
Payer: MEDICARE

## 2022-11-14 VITALS
DIASTOLIC BLOOD PRESSURE: 62 MMHG | WEIGHT: 197.13 LBS | SYSTOLIC BLOOD PRESSURE: 140 MMHG | BODY MASS INDEX: 33.66 KG/M2 | HEIGHT: 64 IN

## 2022-11-14 DIAGNOSIS — M25.552 HIP PAIN, CHRONIC, LEFT: ICD-10-CM

## 2022-11-14 DIAGNOSIS — M79.18 MYOFASCIAL PAIN SYNDROME OF LUMBAR SPINE: Primary | ICD-10-CM

## 2022-11-14 DIAGNOSIS — G89.29 HIP PAIN, CHRONIC, LEFT: ICD-10-CM

## 2022-11-14 PROCEDURE — 99214 OFFICE O/P EST MOD 30 MIN: CPT | Performed by: PHYSICAL MEDICINE & REHABILITATION

## 2022-11-14 NOTE — PROGRESS NOTES
RETURN PATIENT VISIT    CHIEF COMPLAINT  Low back/left hip/left lower extremity pain    INTERVAL HISTORY  Yaneth Dunn is a 81year old who was last seen in clinic on 9/19/2022. Patient has returned from [place] and she endorses significant improvement in her low back as well as left hip pains. She was previously experiencing significant left groin pain as well as left lower extremity radicular symptoms. She was participating in physical therapy and at times being unable to ambulate without support. She endorses after initiation of meloxicam daily she has had a significant improvement in her symptoms with the therapy and with the medication. She has begun to wean down off of the meloxicam taking it every 4 to 5 days at this point. She will continue to wean down and plans to split her tabs in half as she would like to eventually come off of this medication. She denies any red flag symptoms, denies any progression of her symptoms. Currently denies any low back, left lower extremity left hip or knee pain. She is very happy with her progress. REVIEW OF SYSTEMS  Review of systems was completed with the patient today as pertinent to today's visit    PHYSICAL EXAMINATION  CONSTITUTIONAL: Well-appearing, in no apparent distress  EYES: No scleral icterus or conjunctival hemorrhage  CARDIOVASCULAR: Skin warm and well-perfused, no peripheral edema  RESPIRATORY: Breathing unlabored without accessory muscle use  PSYCHIATRIC: Alert, cooperative, appropriate mood and affect  SKIN: No lesions or rashes on exposed skin  MUSCULOSKELETAL: Good mobility of lumbar spine in flexion and extension, no exacerbation in symptoms. Good range of motion in bilateral hips with internal and external rotation without exacerbation of symptoms. NEUROLOGIC: Well-maintained strength in lower extremities, sensation grossly intact light touch.     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 IMPRESSION/DIAGNOSIS  Myofascial pain syndrome of lumbar spine  (primary encounter diagnosis)  Hip pain, chronic, left      TREATMENT/PLAN    Patient will follow-up with me as needed in the future. I have instructed the patient on how to wean down from her medication, she will continue to wean down on her meloxicam splitting her tabs in half taking once a week as needed for pain. Future dose adjustments or medication alterations can be considered depending on her progress. Education was provided regarding the above impression/diagnosis and treatment options/plan were discussed. All questions were answered during today's visit. Patient will contact clinic if any other questions or concerns.     Calin Brennan DO  Physical Medicine and Rehabilitation / [facility]

## 2022-11-14 NOTE — PATIENT INSTRUCTIONS
Continue to wean down on your Meloxicam, considering splitting your tabs in half. Follow up with me in the future as needed and we can discuss any interventions to your back/hip/knee.

## 2022-11-17 ENCOUNTER — APPOINTMENT (OUTPATIENT)
Dept: PHYSICAL THERAPY | Facility: HOSPITAL | Age: 82
End: 2022-11-17
Attending: ORTHOPAEDIC SURGERY
Payer: MEDICARE

## 2022-11-18 ENCOUNTER — TELEPHONE (OUTPATIENT)
Dept: FAMILY MEDICINE CLINIC | Facility: CLINIC | Age: 82
End: 2022-11-18

## 2022-11-18 RX ORDER — LISINOPRIL AND HYDROCHLOROTHIAZIDE 25; 20 MG/1; MG/1
1 TABLET ORAL DAILY
Qty: 90 TABLET | Refills: 1 | Status: SHIPPED | OUTPATIENT
Start: 2022-11-18

## 2022-11-21 ENCOUNTER — APPOINTMENT (OUTPATIENT)
Dept: PHYSICAL THERAPY | Facility: HOSPITAL | Age: 82
End: 2022-11-21
Attending: ORTHOPAEDIC SURGERY
Payer: MEDICARE

## 2023-02-15 ENCOUNTER — OFFICE VISIT (OUTPATIENT)
Dept: FAMILY MEDICINE CLINIC | Facility: CLINIC | Age: 83
End: 2023-02-15

## 2023-02-15 VITALS
SYSTOLIC BLOOD PRESSURE: 152 MMHG | HEART RATE: 81 BPM | HEIGHT: 64 IN | OXYGEN SATURATION: 99 % | BODY MASS INDEX: 34.83 KG/M2 | WEIGHT: 204 LBS | DIASTOLIC BLOOD PRESSURE: 66 MMHG

## 2023-02-15 DIAGNOSIS — I73.9 CLAUDICATION (HCC): ICD-10-CM

## 2023-02-15 DIAGNOSIS — E78.2 MIXED HYPERLIPIDEMIA: ICD-10-CM

## 2023-02-15 DIAGNOSIS — I25.10 CORONARY ARTERY DISEASE INVOLVING NATIVE CORONARY ARTERY OF NATIVE HEART WITHOUT ANGINA PECTORIS: ICD-10-CM

## 2023-02-15 DIAGNOSIS — Z00.00 ENCOUNTER FOR ANNUAL HEALTH EXAMINATION: Primary | ICD-10-CM

## 2023-02-15 DIAGNOSIS — N95.1 MENOPAUSAL STATE: ICD-10-CM

## 2023-02-15 DIAGNOSIS — Z86.010 HISTORY OF COLON POLYPS: ICD-10-CM

## 2023-02-15 DIAGNOSIS — Z85.3 HISTORY OF BREAST CANCER: ICD-10-CM

## 2023-02-15 DIAGNOSIS — M25.552 LATERAL PAIN OF LEFT HIP: ICD-10-CM

## 2023-02-15 DIAGNOSIS — R31.21 ASYMPTOMATIC MICROSCOPIC HEMATURIA: ICD-10-CM

## 2023-02-15 DIAGNOSIS — I10 PRIMARY HYPERTENSION: ICD-10-CM

## 2023-02-15 PROBLEM — E66.01 MORBID (SEVERE) OBESITY DUE TO EXCESS CALORIES (HCC): Status: ACTIVE | Noted: 2023-02-15

## 2023-02-15 PROCEDURE — G0439 PPPS, SUBSEQ VISIT: HCPCS | Performed by: STUDENT IN AN ORGANIZED HEALTH CARE EDUCATION/TRAINING PROGRAM

## 2023-02-15 PROCEDURE — 1126F AMNT PAIN NOTED NONE PRSNT: CPT | Performed by: STUDENT IN AN ORGANIZED HEALTH CARE EDUCATION/TRAINING PROGRAM

## 2023-02-15 RX ORDER — ATORVASTATIN CALCIUM 80 MG/1
80 TABLET, FILM COATED ORAL NIGHTLY
Qty: 90 TABLET | Refills: 1 | COMMUNITY
Start: 2023-02-15

## 2023-02-28 ENCOUNTER — LAB ENCOUNTER (OUTPATIENT)
Dept: LAB | Facility: HOSPITAL | Age: 83
End: 2023-02-28
Attending: STUDENT IN AN ORGANIZED HEALTH CARE EDUCATION/TRAINING PROGRAM
Payer: MEDICARE

## 2023-02-28 DIAGNOSIS — N95.1 MENOPAUSAL STATE: ICD-10-CM

## 2023-02-28 DIAGNOSIS — I10 PRIMARY HYPERTENSION: ICD-10-CM

## 2023-02-28 LAB
ALBUMIN SERPL-MCNC: 3.6 G/DL (ref 3.4–5)
ALBUMIN/GLOB SERPL: 1.1 {RATIO} (ref 1–2)
ALP LIVER SERPL-CCNC: 71 U/L
ALT SERPL-CCNC: 24 U/L
ANION GAP SERPL CALC-SCNC: 6 MMOL/L (ref 0–18)
AST SERPL-CCNC: 17 U/L (ref 15–37)
BASOPHILS # BLD AUTO: 0.11 X10(3) UL (ref 0–0.2)
BASOPHILS NFR BLD AUTO: 1.5 %
BILIRUB SERPL-MCNC: 0.7 MG/DL (ref 0.1–2)
BILIRUB UR QL: NEGATIVE
BUN BLD-MCNC: 18 MG/DL (ref 7–18)
BUN/CREAT SERPL: 20.5 (ref 10–20)
CALCIUM BLD-MCNC: 10.4 MG/DL (ref 8.5–10.1)
CHLORIDE SERPL-SCNC: 109 MMOL/L (ref 98–112)
CHOLEST SERPL-MCNC: 143 MG/DL (ref ?–200)
CLARITY UR: CLEAR
CO2 SERPL-SCNC: 29 MMOL/L (ref 21–32)
COLOR UR: YELLOW
CREAT BLD-MCNC: 0.88 MG/DL
DEPRECATED RDW RBC AUTO: 42.9 FL (ref 35.1–46.3)
EOSINOPHIL # BLD AUTO: 0.19 X10(3) UL (ref 0–0.7)
EOSINOPHIL NFR BLD AUTO: 2.7 %
ERYTHROCYTE [DISTWIDTH] IN BLOOD BY AUTOMATED COUNT: 12.6 % (ref 11–15)
FASTING PATIENT LIPID ANSWER: YES
FASTING STATUS PATIENT QL REPORTED: YES
GFR SERPLBLD BASED ON 1.73 SQ M-ARVRAT: 66 ML/MIN/1.73M2 (ref 60–?)
GLOBULIN PLAS-MCNC: 3.4 G/DL (ref 2.8–4.4)
GLUCOSE BLD-MCNC: 94 MG/DL (ref 70–99)
GLUCOSE UR-MCNC: NORMAL MG/DL
HCT VFR BLD AUTO: 41.2 %
HDLC SERPL-MCNC: 60 MG/DL (ref 40–59)
HGB BLD-MCNC: 13.6 G/DL
IMM GRANULOCYTES # BLD AUTO: 0.01 X10(3) UL (ref 0–1)
IMM GRANULOCYTES NFR BLD: 0.1 %
KETONES UR-MCNC: NEGATIVE MG/DL
LDLC SERPL CALC-MCNC: 59 MG/DL (ref ?–100)
LEUKOCYTE ESTERASE UR QL STRIP.AUTO: 250
LYMPHOCYTES # BLD AUTO: 2.99 X10(3) UL (ref 1–4)
LYMPHOCYTES NFR BLD AUTO: 42 %
MCH RBC QN AUTO: 30.5 PG (ref 26–34)
MCHC RBC AUTO-ENTMCNC: 33 G/DL (ref 31–37)
MCV RBC AUTO: 92.4 FL
MONOCYTES # BLD AUTO: 0.52 X10(3) UL (ref 0.1–1)
MONOCYTES NFR BLD AUTO: 7.3 %
NEUTROPHILS # BLD AUTO: 3.3 X10 (3) UL (ref 1.5–7.7)
NEUTROPHILS # BLD AUTO: 3.3 X10(3) UL (ref 1.5–7.7)
NEUTROPHILS NFR BLD AUTO: 46.4 %
NITRITE UR QL STRIP.AUTO: NEGATIVE
NONHDLC SERPL-MCNC: 83 MG/DL (ref ?–130)
OSMOLALITY SERPL CALC.SUM OF ELEC: 300 MOSM/KG (ref 275–295)
PH UR: 6.5 [PH] (ref 5–8)
PLATELET # BLD AUTO: 224 10(3)UL (ref 150–450)
POTASSIUM SERPL-SCNC: 3.7 MMOL/L (ref 3.5–5.1)
PROT SERPL-MCNC: 7 G/DL (ref 6.4–8.2)
RBC # BLD AUTO: 4.46 X10(6)UL
RBC #/AREA URNS AUTO: >10 /HPF
SODIUM SERPL-SCNC: 144 MMOL/L (ref 136–145)
SP GR UR STRIP: 1.02 (ref 1–1.03)
T4 FREE SERPL-MCNC: 0.8 NG/DL (ref 0.8–1.7)
TRIGL SERPL-MCNC: 137 MG/DL (ref 30–149)
TSI SER-ACNC: 4.28 MIU/ML (ref 0.36–3.74)
UROBILINOGEN UR STRIP-ACNC: NORMAL
VIT B12 SERPL-MCNC: 582 PG/ML (ref 193–986)
VLDLC SERPL CALC-MCNC: 20 MG/DL (ref 0–30)
WBC # BLD AUTO: 7.1 X10(3) UL (ref 4–11)

## 2023-02-28 PROCEDURE — 36415 COLL VENOUS BLD VENIPUNCTURE: CPT

## 2023-02-28 PROCEDURE — 84439 ASSAY OF FREE THYROXINE: CPT

## 2023-02-28 PROCEDURE — 84443 ASSAY THYROID STIM HORMONE: CPT

## 2023-02-28 PROCEDURE — 80061 LIPID PANEL: CPT

## 2023-02-28 PROCEDURE — 80053 COMPREHEN METABOLIC PANEL: CPT

## 2023-02-28 PROCEDURE — 82607 VITAMIN B-12: CPT

## 2023-02-28 PROCEDURE — 81001 URINALYSIS AUTO W/SCOPE: CPT

## 2023-02-28 PROCEDURE — 85025 COMPLETE CBC W/AUTO DIFF WBC: CPT

## 2023-04-10 ENCOUNTER — TELEPHONE (OUTPATIENT)
Dept: FAMILY MEDICINE CLINIC | Facility: CLINIC | Age: 83
End: 2023-04-10

## 2023-04-10 DIAGNOSIS — I25.10 CORONARY ARTERY DISEASE INVOLVING NATIVE CORONARY ARTERY OF NATIVE HEART WITHOUT ANGINA PECTORIS: ICD-10-CM

## 2023-04-10 DIAGNOSIS — E78.2 MIXED HYPERLIPIDEMIA: ICD-10-CM

## 2023-04-10 RX ORDER — ATORVASTATIN CALCIUM 80 MG/1
80 TABLET, FILM COATED ORAL NIGHTLY
Qty: 90 TABLET | Refills: 3 | Status: CANCELLED | OUTPATIENT
Start: 2023-04-10

## 2023-04-10 NOTE — TELEPHONE ENCOUNTER
Patient called saying when she last saw Francosie Wright they spoke about a cheaper option for rosuvastatin since it's $90 for a 90 day supply.  I see that atorvastatin 80 mg is on file as historical. Please send new prescription

## 2023-04-11 NOTE — TELEPHONE ENCOUNTER
Disregard request. Received call from patient stating that she will continue with Rosuvastatin. Patient called her insurance and medication for 90 days will cost her $11.45.

## 2023-04-12 RX ORDER — ROSUVASTATIN CALCIUM 20 MG/1
20 TABLET, COATED ORAL NIGHTLY
Refills: 0 | COMMUNITY
Start: 2023-04-12

## 2023-05-22 NOTE — TELEPHONE ENCOUNTER
Please review. Protocol failed / Has no protocol. Requested Prescriptions   Pending Prescriptions Disp Refills    LISINOPRIL-HYDROCHLOROTHIAZIDE 20-25 MG Oral Tab [Pharmacy Med Name: Lisinopril/Hydrochlorothiazide 20-25 Mg Tab Sol] 90 tablet 0     Sig: Take 1 tablet by mouth daily.        Hypertensive Medications Protocol Failed - 5/21/2023  1:30 AM        Failed - Last BP reading less than 140/90     BP Readings from Last 1 Encounters:  02/15/23 : 152/66                Passed - In person appointment in the past 12 or next 3 months     Recent Outpatient Visits              3 months ago Encounter for annual health examination    [facility], [address] [place]    Office Visit    6 months ago Myofascial pain syndrome of lumbar spine    [facility], [address], [place], [place]    Office Visit    6 months ago Lumbar radiculopathy    [facility], [address], [place]    Office Visit    6 months ago     [address], [place]    Office Visit    8 months ago     [id number] [address],     Office Visit          Future Appointments         Provider Department Appt Notes    In 1 month [facility] [place] [address] for Health Last mamm 4/4/22               Passed - CMP or BMP in past 6 months     Recent Results (from the past 4392 hour(s))   COMP METABOLIC PANEL (14)    Collection Time: 02/28/23  7:31 AM   Result Value Ref Range    Glucose 94 70 - 99 mg/dL    Sodium 144 136 - 145 mmol/L    Potassium 3.7 3.5 - 5.1 mmol/L    Chloride 109 98 - 112 mmol/L    CO2 29.0 21.0 - 32.0 mmol/L    Anion Gap 6 0 - 18 mmol/L    BUN 18 7 - 18 mg/dL    Creatinine 0.88 0.55 - 1.02 mg/dL    BUN/CREA Ratio 20.5 (H) 10.0 - 20.0    Calcium, Total 10.4 (H) 8.5 - 10.1 mg/dL    Calculated Osmolality 300 (H) 275 - 295 mOsm/kg    eGFR-Cr 66 >=60 mL/min/1.73m2    ALT 24 13 - 56 U/L    AST 17 15 - 37 U/L    Alkaline Phosphatase 71 55 - 142 U/L    Bilirubin, Total 0.7 0.1 - 2.0 mg/dL    Total Protein 7.0 6.4 - 8.2 g/dL    Albumin 3.6 3.4 - 5.0 g/dL    Globulin  3.4 2.8 - 4.4 g/dL    A/G Ratio 1.1 1.0 - 2.0    Patient Fasting for CMP? Yes      *Note: Due to a large number of results and/or encounters for the requested time period, some results have not been displayed. A complete set of results can be found in Results Review.                  Passed - In person appointment or virtual visit in the past 6 months     Recent Outpatient Visits              3 months ago Encounter for annual health examination    Moreno Abbott MD    Office Visit    6 months ago Myofascial pain syndrome of lumbar spine    [facility], [address], [place], [place]    Office Visit    6 months ago Lumbar radiculopathy    [facility],[address], [address], [place]    Office Visit    6 months ago     [address], [place]    Office Visit    8 months ago     [address] [facility],     Office Visit          Future Appointments         Provider Department Appt Notes    In 1 month [facility] [place] [address] for Health Last mamm 4/4/22               Passed - [facility] or GFRNAA > 50     GFR Evaluation  EGFRCR: 66 , resulted on 2/28/2023               Future Appointments         Provider Department Appt Notes    In 1 month [facility] [place] [address] for Health Last mamm 4/4/22           Recent Outpatient Visits              3 months ago Encounter for annual health examination    Amor Krishnan Marry [place], MD    Office Visit    6 months ago Myofascial pain syndrome of lumbar spine    [facility], [address], [address] Anguiano DO    Office Visit    6 months ago Lumbar radiculopathy    [facility], [address], Rolf Matos MD    Office Visit    6 months ago     [address] [place], [place]    Office Visit    8 months ago     [address] [place], [place]    Office Visit         kins

## 2023-05-22 NOTE — TELEPHONE ENCOUNTER
Please review. Protocol failed or has no protocol. Requested Prescriptions   Pending Prescriptions Disp Refills    LISINOPRIL-HYDROCHLOROTHIAZIDE 20-25 MG Oral Tab [Pharmacy Med Name: Lisinopril/Hydrochlorothiazide 20-25 Mg Tab Sol] 90 tablet 0     Sig: Take 1 tablet by mouth daily.        Hypertensive Medications Protocol Failed - 5/21/2023  1:30 AM        Failed - Last BP reading less than 140/90     BP Readings from Last 1 Encounters:  02/15/23 : 152/66                Passed - In person appointment in the past 12 or next 3 months     Recent Outpatient Visits              3 months ago Encounter for annual health examination    [address], [address] Ashely Ware MD    Office Visit    6 months ago Myofascial pain syndrome of lumbar spine    [facility], [address], [place], [place]    Office Visit    6 months ago Lumbar radiculopathy    [facility], [address], Justo Matos MD    Office Visit    6 months ago     [address] [place], [place]    Office Visit    8 months ago     [address] [facility],     Office Visit          Future Appointments         Provider Department Appt Notes    In 1 month [facility] [address] [facility] Last mamm 4/4/22               Passed - CMP or BMP in past 6 months     Recent Results (from the past 4392 hour(s))   COMP METABOLIC PANEL (14)    Collection Time: 02/28/23  7:31 AM   Result Value Ref Range    Glucose 94 70 - 99 mg/dL    Sodium 144 136 - 145 mmol/L    Potassium 3.7 3.5 - 5.1 mmol/L    Chloride 109 98 - 112 mmol/L    CO2 29.0 21.0 - 32.0 mmol/L    Anion Gap 6 0 - 18 mmol/L    BUN 18 7 - 18 mg/dL    Creatinine 0.88 0.55 - 1.02 mg/dL    BUN/CREA Ratio 20.5 (H) 10.0 - 20.0    Calcium, Total 10.4 (H) 8.5 - 10.1 mg/dL    Calculated Osmolality 300 (H) 275 - 295 mOsm/kg    eGFR-Cr 66 >=60 mL/min/1.73m2    ALT 24 13 - 56 U/L    AST 17 15 - 37 U/L    Alkaline Phosphatase 71 55 - 142 U/L    Bilirubin, Total 0.7 0.1 - 2.0 mg/dL    Total Protein 7.0 6.4 - 8.2 g/dL    Albumin 3.6 3.4 - 5.0 g/dL    Globulin  3.4 2.8 - 4.4 g/dL    A/G Ratio 1.1 1.0 - 2.0    Patient Fasting for CMP? Yes      *Note: Due to a large number of results and/or encounters for the requested time period, some results have not been displayed. A complete set of results can be found in Results Review.                  Passed - In person appointment or virtual visit in the past 6 months     Recent Outpatient Visits              3 months ago Encounter for annual health examination    Jennifer Morales MD    Office Visit    6 months ago Myofascial pain syndrome of lumbar spine    [facility], [address], Jignesh Baxter [place], [place]    Office Visit    6 months ago Lumbar radiculopathy    [facility], [address] [facility] [place]    Office Visit    6 months ago     [address] [place], [place]    Office Visit    8 months ago     [address] [facility] [place],     Office Visit          Future Appointments         Provider Department Appt Notes    In 1 month [facility] OF THE [place] [address] [address] [place] [address] for Health Last mamm 4/4/22               Passed - [facility] or GFRNAA > 50     GFR Evaluation  EGFRCR: 66 , resulted on 2/28/2023                 Recent Outpatient Visits              3 months ago Encounter for annual health examination    Jennifer Morales MD    Office Visit    6 months ago Myofascial pain syndrome of lumbar spine    [facility], [address], [place]    Office Visit    6 months ago Lumbar radiculopathy    [facility], [address] [facility] [place] Justo Marcos MD    Office Visit    6 months ago     [address] [place], [place]    Office Visit    8 months ago     [address] [facility], [place]    Office Visit            Future Appointments         Provider Department Appt Notes    In 1 month [address] for Health Last mamm 4/4/22

## 2023-05-23 RX ORDER — LISINOPRIL AND HYDROCHLOROTHIAZIDE 25; 20 MG/1; MG/1
1 TABLET ORAL DAILY
Qty: 90 TABLET | Refills: 3 | Status: SHIPPED | OUTPATIENT
Start: 2023-05-23

## 2023-06-22 ENCOUNTER — HOSPITAL ENCOUNTER (OUTPATIENT)
Dept: MAMMOGRAPHY | Facility: HOSPITAL | Age: 83
Discharge: HOME | End: 2023-06-22
Attending: STUDENT IN AN ORGANIZED HEALTH CARE EDUCATION/TRAINING PROGRAM
Payer: MEDICARE

## 2023-06-22 DIAGNOSIS — Z12.31 ENCOUNTER FOR SCREENING MAMMOGRAM FOR MALIGNANT NEOPLASM OF BREAST: ICD-10-CM

## 2023-06-22 PROCEDURE — 77067 SCR MAMMO BI INCL CAD: CPT | Performed by: STUDENT IN AN ORGANIZED HEALTH CARE EDUCATION/TRAINING PROGRAM

## 2023-06-22 PROCEDURE — 77063 BREAST TOMOSYNTHESIS BI: CPT | Performed by: STUDENT IN AN ORGANIZED HEALTH CARE EDUCATION/TRAINING PROGRAM

## 2023-08-01 ENCOUNTER — OFFICE VISIT (OUTPATIENT)
Dept: PHYSICAL MEDICINE AND REHAB | Facility: CLINIC | Age: 83
End: 2023-08-01
Payer: MEDICARE

## 2023-08-01 VITALS
HEART RATE: 74 BPM | DIASTOLIC BLOOD PRESSURE: 72 MMHG | OXYGEN SATURATION: 96 % | SYSTOLIC BLOOD PRESSURE: 120 MMHG | BODY MASS INDEX: 35.17 KG/M2 | WEIGHT: 206 LBS | HEIGHT: 64 IN

## 2023-08-01 DIAGNOSIS — M47.816 LUMBAR SPONDYLOSIS: ICD-10-CM

## 2023-08-01 DIAGNOSIS — M54.50 MYOFASCIAL LOW BACK PAIN: Primary | ICD-10-CM

## 2023-08-01 PROCEDURE — 99214 OFFICE O/P EST MOD 30 MIN: CPT | Performed by: PHYSICAL MEDICINE & REHABILITATION

## 2023-08-01 RX ORDER — MELOXICAM 15 MG/1
15 TABLET ORAL DAILY
Qty: 30 TABLET | Refills: 0 | Status: SHIPPED | OUTPATIENT
Start: 2023-08-01

## 2023-08-01 NOTE — PROGRESS NOTES
RETURN PATIENT VISIT    CHIEF COMPLAINT  Left-sided low back pain    INTERVAL HISTORY  Viktoria Sorensen is a 81year old who was last seen in clinic on 11/14/2022. At that visit patient was doing well on intermittent meloxicam.  At that time she was taking it every 4 to 5 days with relief of her symptoms. Perform some home stretching and strengthening exercises. Overall in the last 9 months she continues to endorse improvement in her symptoms and ongoing function in her day-to-day life. She has further wean down her use of meloxicam and is now only requiring medication maybe 2 times per month. She also uses a topical menthol gel left-sided low back. Her pain usually comes on with prolonged sitting as well as driving. Located in the upper buttock/low back near the gluteal muscles. She denies any radiation of symptoms into her lower extremities, denies any groin pain denies any progressive weakness, progressive numbness or bowel or bladder dysfunction. REVIEW OF SYSTEMS  Review of systems was completed with the patient today as pertinent to today's visit    PHYSICAL EXAMINATION  CONSTITUTIONAL: Well-appearing, in no apparent distress  EYES: No scleral icterus or conjunctival hemorrhage  CARDIOVASCULAR: Skin warm and well-perfused, no peripheral edema  RESPIRATORY: Breathing unlabored without accessory muscle use  PSYCHIATRIC: Alert, cooperative, appropriate mood and affect  SKIN: No lesions or rashes on exposed skin  MUSCULOSKELETAL: Mild tenderness to palpation over the left-sided gluteal muscles, glue medius and glue minimus. No significant tenderness over the sacroiliac joints or greater trochanter on the left side. Good range of motion of the lumbar spine in flexion and extension without exacerbation of symptoms. NEUROLOGIC: Well-maintained and stable strength and sensation in the lower extremities. REVIEW OF PRIOR X-RAYS/STUDIES  No new imaging to review  IMPRESSION/DIAGNOSIS  1.   Myofascial low back pain  (primary encounter diagnosis)  Lumbar spondylosis      TREATMENT/PLAN  Continue meloxicam prn. Refill provided today. Added home stretches and exercises today to address gluteal muscle pain. Follow-up as needed. Education was provided regarding the above impression/diagnosis and treatment options/plan were discussed. All questions were answered during today's visit. Patient will contact clinic if any other questions or concerns.     Jackelin Ackerman DO  Physical Medicine and Rehabilitation / [address] [facility]

## 2023-08-30 RX ORDER — MELOXICAM 15 MG/1
15 TABLET ORAL DAILY
Qty: 30 TABLET | Refills: 0 | Status: SHIPPED | OUTPATIENT
Start: 2023-08-30

## 2024-01-17 ENCOUNTER — TELEPHONE (OUTPATIENT)
Dept: PHYSICAL MEDICINE AND REHAB | Facility: CLINIC | Age: 84
End: 2024-01-17

## 2024-01-17 NOTE — TELEPHONE ENCOUNTER
Pt called office as she wanted to ask Dr Sauceda if he knows any rheumatologist that pt can see. Informed patient that will ask Dr Sauceda and will call her to notify with his recommendations.     Per Dr Sauceda he recommended Dr Boswell or Dr Dillon. Informed patient on Dr Sauceda's recommendations. Also provided patient with the contact information for these Doctors.     Pt was requesting also if there is a doctor in [place] she can see as it is closer.     Gave patient the information to call rheumatology at [facility] and also provided the Doctor's name. Pt will review and decide which Doctor she can schedule an appointment with.     No further actions needed at this time.   Closing the encounter.

## 2024-02-19 ENCOUNTER — TELEPHONE (OUTPATIENT)
Dept: FAMILY MEDICINE CLINIC | Facility: CLINIC | Age: 84
End: 2024-02-19

## 2024-02-19 DIAGNOSIS — E78.2 MIXED HYPERLIPIDEMIA: ICD-10-CM

## 2024-02-19 DIAGNOSIS — M85.80 OSTEOPENIA, UNSPECIFIED LOCATION: Primary | ICD-10-CM

## 2024-02-19 DIAGNOSIS — I10 PRIMARY HYPERTENSION: ICD-10-CM

## 2024-02-19 DIAGNOSIS — R31.21 ASYMPTOMATIC MICROSCOPIC HEMATURIA: ICD-10-CM

## 2024-02-19 NOTE — TELEPHONE ENCOUNTER
Pt has an appt or medicare wellness on 3/21/24 and would like labs to be put in to be done prior to appt. Please advise

## 2024-02-29 NOTE — TELEPHONE ENCOUNTER
Patient asking for refill on Lisinopril-hydrochlorothiazide. Informed patient that 1 year supply was sent to [facility]. Patient states she no longer uses [facility] and would like prescription sent to [facility] pharmacy.    Prescription pending

## 2024-03-02 RX ORDER — LISINOPRIL AND HYDROCHLOROTHIAZIDE 25; 20 MG/1; MG/1
1 TABLET ORAL DAILY
Qty: 90 TABLET | Refills: 3 | Status: SHIPPED | OUTPATIENT
Start: 2024-03-02

## 2024-03-06 ENCOUNTER — LAB ENCOUNTER (OUTPATIENT)
Dept: LAB | Facility: HOSPITAL | Age: 84
End: 2024-03-06
Attending: STUDENT IN AN ORGANIZED HEALTH CARE EDUCATION/TRAINING PROGRAM
Payer: MEDICARE

## 2024-03-06 DIAGNOSIS — E78.2 MIXED HYPERLIPIDEMIA: ICD-10-CM

## 2024-03-06 DIAGNOSIS — M85.80 OSTEOPENIA, UNSPECIFIED LOCATION: ICD-10-CM

## 2024-03-06 DIAGNOSIS — R31.21 ASYMPTOMATIC MICROSCOPIC HEMATURIA: ICD-10-CM

## 2024-03-06 DIAGNOSIS — I10 PRIMARY HYPERTENSION: ICD-10-CM

## 2024-03-06 LAB
ALBUMIN SERPL-MCNC: 4.4 G/DL (ref 3.2–4.8)
ALBUMIN/GLOB SERPL: 1.8 {RATIO} (ref 1–2)
ALP LIVER SERPL-CCNC: 66 U/L
ALT SERPL-CCNC: 18 U/L
ANION GAP SERPL CALC-SCNC: 5 MMOL/L (ref 0–18)
AST SERPL-CCNC: 24 U/L (ref ?–34)
BILIRUB SERPL-MCNC: 0.6 MG/DL (ref 0.2–1.1)
BILIRUB UR QL: NEGATIVE
BUN BLD-MCNC: 17 MG/DL (ref 9–23)
BUN/CREAT SERPL: 20.2 (ref 10–20)
CALCIUM BLD-MCNC: 11.1 MG/DL (ref 8.7–10.4)
CHLORIDE SERPL-SCNC: 109 MMOL/L (ref 98–112)
CHOLEST SERPL-MCNC: 136 MG/DL (ref ?–200)
CLARITY UR: CLEAR
CO2 SERPL-SCNC: 29 MMOL/L (ref 21–32)
COLOR UR: YELLOW
CREAT BLD-MCNC: 0.84 MG/DL
DEPRECATED RDW RBC AUTO: 41.7 FL (ref 35.1–46.3)
EGFRCR SERPLBLD CKD-EPI 2021: 69 ML/MIN/1.73M2 (ref 60–?)
ERYTHROCYTE [DISTWIDTH] IN BLOOD BY AUTOMATED COUNT: 12.8 % (ref 11–15)
FASTING PATIENT LIPID ANSWER: YES
FASTING STATUS PATIENT QL REPORTED: YES
GLOBULIN PLAS-MCNC: 2.5 G/DL (ref 2.8–4.4)
GLUCOSE BLD-MCNC: 82 MG/DL (ref 70–99)
GLUCOSE UR-MCNC: NORMAL MG/DL
HCT VFR BLD AUTO: 42 %
HDLC SERPL-MCNC: 50 MG/DL (ref 40–59)
HGB BLD-MCNC: 13.5 G/DL
KETONES UR-MCNC: NEGATIVE MG/DL
LDLC SERPL CALC-MCNC: 65 MG/DL (ref ?–100)
LEUKOCYTE ESTERASE UR QL STRIP.AUTO: 250
MCH RBC QN AUTO: 28.9 PG (ref 26–34)
MCHC RBC AUTO-ENTMCNC: 32.1 G/DL (ref 31–37)
MCV RBC AUTO: 89.9 FL
NITRITE UR QL STRIP.AUTO: NEGATIVE
NONHDLC SERPL-MCNC: 86 MG/DL (ref ?–130)
OSMOLALITY SERPL CALC.SUM OF ELEC: 297 MOSM/KG (ref 275–295)
PH UR: 6.5 [PH] (ref 5–8)
PLATELET # BLD AUTO: 234 10(3)UL (ref 150–450)
POTASSIUM SERPL-SCNC: 3.7 MMOL/L (ref 3.5–5.1)
PROT SERPL-MCNC: 6.9 G/DL (ref 5.7–8.2)
PROT UR-MCNC: NEGATIVE MG/DL
RBC # BLD AUTO: 4.67 X10(6)UL
SODIUM SERPL-SCNC: 143 MMOL/L (ref 136–145)
SP GR UR STRIP: 1.02 (ref 1–1.03)
T4 FREE SERPL-MCNC: 0.9 NG/DL (ref 0.8–1.7)
TRIGL SERPL-MCNC: 118 MG/DL (ref 30–149)
TSI SER-ACNC: 5.33 MIU/ML (ref 0.55–4.78)
UROBILINOGEN UR STRIP-ACNC: NORMAL
VIT B12 SERPL-MCNC: 711 PG/ML (ref 211–911)
VIT D+METAB SERPL-MCNC: 28.5 NG/ML (ref 30–100)
VLDLC SERPL CALC-MCNC: 18 MG/DL (ref 0–30)
WBC # BLD AUTO: 6 X10(3) UL (ref 4–11)

## 2024-03-06 PROCEDURE — 82306 VITAMIN D 25 HYDROXY: CPT

## 2024-03-06 PROCEDURE — 80061 LIPID PANEL: CPT

## 2024-03-06 PROCEDURE — 81001 URINALYSIS AUTO W/SCOPE: CPT

## 2024-03-06 PROCEDURE — 84439 ASSAY OF FREE THYROXINE: CPT

## 2024-03-06 PROCEDURE — 84443 ASSAY THYROID STIM HORMONE: CPT

## 2024-03-06 PROCEDURE — 82607 VITAMIN B-12: CPT

## 2024-03-06 PROCEDURE — 85027 COMPLETE CBC AUTOMATED: CPT

## 2024-03-06 PROCEDURE — 80053 COMPREHEN METABOLIC PANEL: CPT

## 2024-03-06 PROCEDURE — 36415 COLL VENOUS BLD VENIPUNCTURE: CPT

## 2024-03-18 ENCOUNTER — HOSPITAL ENCOUNTER (OUTPATIENT)
Dept: GENERAL RADIOLOGY | Facility: HOSPITAL | Age: 84
Discharge: HOME | End: 2024-03-18
Attending: INTERNAL MEDICINE
Payer: MEDICARE

## 2024-03-18 ENCOUNTER — LAB ENCOUNTER (OUTPATIENT)
Dept: LAB | Facility: HOSPITAL | Age: 84
End: 2024-03-18
Attending: INTERNAL MEDICINE
Payer: MEDICARE

## 2024-03-18 ENCOUNTER — OFFICE VISIT (OUTPATIENT)
Dept: RHEUMATOLOGY | Facility: CLINIC | Age: 84
End: 2024-03-18

## 2024-03-18 VITALS
DIASTOLIC BLOOD PRESSURE: 75 MMHG | SYSTOLIC BLOOD PRESSURE: 146 MMHG | HEART RATE: 82 BPM | HEIGHT: 64 IN | WEIGHT: 208 LBS | BODY MASS INDEX: 35.51 KG/M2

## 2024-03-18 DIAGNOSIS — M79.642 BILATERAL HAND PAIN: Primary | ICD-10-CM

## 2024-03-18 DIAGNOSIS — M79.641 BILATERAL HAND PAIN: Primary | ICD-10-CM

## 2024-03-18 DIAGNOSIS — M79.642 BILATERAL HAND PAIN: ICD-10-CM

## 2024-03-18 DIAGNOSIS — M79.641 BILATERAL HAND PAIN: ICD-10-CM

## 2024-03-18 DIAGNOSIS — E83.52 HYPERCALCEMIA: ICD-10-CM

## 2024-03-18 PROBLEM — M25.562 CHRONIC PAIN OF LEFT KNEE: Status: ACTIVE | Noted: 2022-06-23

## 2024-03-18 PROBLEM — G89.29 CHRONIC PAIN OF LEFT KNEE: Status: ACTIVE | Noted: 2022-06-23

## 2024-03-18 LAB
CRP SERPL-MCNC: <0.4 MG/DL (ref ?–1)
ERYTHROCYTE [SEDIMENTATION RATE] IN BLOOD: 19 MM/HR
PTH-INTACT SERPL-MCNC: 158.4 PG/ML (ref 18.5–88)
RHEUMATOID FACT SERPL-ACNC: 21 IU/ML (ref ?–14)

## 2024-03-18 PROCEDURE — 86140 C-REACTIVE PROTEIN: CPT | Performed by: INTERNAL MEDICINE

## 2024-03-18 PROCEDURE — 73130 X-RAY EXAM OF HAND: CPT | Performed by: INTERNAL MEDICINE

## 2024-03-18 PROCEDURE — 99204 OFFICE O/P NEW MOD 45 MIN: CPT | Performed by: INTERNAL MEDICINE

## 2024-03-18 PROCEDURE — 85652 RBC SED RATE AUTOMATED: CPT | Performed by: INTERNAL MEDICINE

## 2024-03-18 PROCEDURE — 36415 COLL VENOUS BLD VENIPUNCTURE: CPT | Performed by: INTERNAL MEDICINE

## 2024-03-18 PROCEDURE — 83970 ASSAY OF PARATHORMONE: CPT

## 2024-03-18 PROCEDURE — 86200 CCP ANTIBODY: CPT | Performed by: INTERNAL MEDICINE

## 2024-03-18 PROCEDURE — 86431 RHEUMATOID FACTOR QUANT: CPT | Performed by: INTERNAL MEDICINE

## 2024-03-18 RX ORDER — BOSWELLIA SERRATA EXTRACT 70 %
POWDER (GRAM) MISCELLANEOUS
COMMUNITY
Start: 2022-03-25

## 2024-03-18 RX ORDER — HYDROCODONE BITARTRATE AND ACETAMINOPHEN 10; 325 MG/1; MG/1
1 TABLET ORAL EVERY 4 HOURS PRN
COMMUNITY
Start: 2023-10-20

## 2024-03-18 NOTE — PROGRESS NOTES
Chandrika Strange is a 83 year old female who presents for   Chief Complaint   Patient presents with    Consult     NP referred by PCP  for Joint pain     Joint Pain   .   HPI:   CC: joint pain   Consult: referred by PCP Dr. Amezquita     This is a 84 yo F with hx of HTN, HLD, CAD s/p PCI, Right wrist fracture s/p surgery Oct 2023, Left ankle fracture s/p surgery presents with polyarghralgia's.  She reports pain involving both her hands particular her right hand.  The right middle finger can get stuck and trigger.  She cannot fully extend her right middle finger.  She is able to make a fist.  She also has pain in both thumbs.  Pain is constant but not too painful.  She continues to stay active, does exercises with her hands which helps.  She was seen by orthopedic and physiatry in 2022 due to pain in her left hip and thigh.  She was given meloxicam for about a month and her symptoms improved significantly.  She now has some slight numbness in the left lateral thigh.  Denies any other joint pain or swelling.  Denies any history of psoriasis.        Wt Readings from Last 2 Encounters:   03/18/24 208 lb (94.3 kg)   08/01/23 206 lb (93.4 kg)     Body mass index is 35.7 kg/m².      Current Outpatient Medications   Medication Sig Dispense Refill    lisinopril-hydroCHLOROthiazide 20-25 MG Oral Tab Take 1 tablet by mouth daily. 90 tablet 3    rosuvastatin 20 MG Oral Tab Take 1 tablet (20 mg total) by mouth nightly.  0    Zinc Sulfate (ZINC 15 OR) Take by mouth.      aspirin 81 MG Oral Tab EC Take 1 tablet (81 mg total) by mouth daily. 30 tablet 1    Cyanocobalamin (VITAMIN B-12 OR) Take by mouth.      Boswellia Hermila (BOSWELLIA OR) Take by mouth.      Multiple Vitamins-Minerals (OCUVITE ADULT 50+ OR) Take by mouth.      Coenzyme Q10 (COQ10 OR) Take  by mouth.      cholecalciferol 25 MCG (1000 UT) Oral Cap Take  by mouth.      Multiple Vitamin (MULTIVITAMINS) Oral Cap Take  by mouth.      omega-3 fatty acids (FISH OIL)  1000 MG Oral Cap Take 1,000 mg by mouth daily.      MELOXICAM 15 MG Oral Tab Take 1 tablet (15 mg total) by mouth daily. (Patient not taking: Reported on 3/18/2024) 30 tablet 0    Meloxicam 7.5 MG Oral Tab Take 1 tablet (7.5 mg total) by mouth daily. (Patient not taking: Reported on 2022) 30 tablet 1    Na Sulfate-K Sulfate-Mg Sulf (SUPREP BOWEL PREP KIT) 17.5-3.13-1.6 GM/177ML Oral Solution Take as directed (Patient not taking: Reported on 2022) 1 each 0    Clopidogrel Bisulfate 75 MG Oral Tab Take 1 tablet (75 mg total) by mouth daily. (Patient not taking: Reported on 2/15/2023) 90 tablet 3      Past Medical History:   Diagnosis Date    Breast cancer (HCC) 2006    SURGERY, RADIATION    Coronary atherosclerosis     High blood pressure     High cholesterol     History of perforated ear drum     Unspecified essential hypertension       Past Surgical History:   Procedure Laterality Date    COLONOSCOPY N/A 3/5/2018    Procedure: COLONOSCOPY;  Surgeon: Patricio Lassiter MD;  Location: [facility] ENDOSCOPY    COLONOSCOPY N/A 2022    Procedure: COLONOSCOPY;  Surgeon: Patricio Lassiter MD;  Location: [facility] ENDOSCOPY    LUMPECTOMY LEFT            OTHER SURGICAL HISTORY      ankle sx    OTHER SURGICAL HISTORY      ear surgery    OTHER SURGICAL HISTORY      Mastoidectomy    RADIATION LEFT        Family History   Problem Relation Age of Onset    Cancer Father         Cancer-brain    Other (Other) Mother     Cancer Sister         Cancer-colon    Breast Cancer Self 66        lt breast      Social History:  Social History     Socioeconomic History    Marital status:    Tobacco Use    Smoking status: Never    Smokeless tobacco: Never   Vaping Use    Vaping Use: Never used   Substance and Sexual Activity    Alcohol use: Yes     Alcohol/week: 0.0 standard drinks of alcohol     Comment: 1-3 glasses of wine/week    Drug use: No    Sexual activity: Not Currently     Partners: Male   Other Topics Concern     Caffeine Concern Yes     Comment: Coffee    Reaction to local anesthetic No           REVIEW OF SYSTEMS:   Review Of Systems:  Constitutional: No fever, no change in weight or appetitie  Derm: No rashes, no oral ulcers, no alopecia, no photosensitivity, no psoriasis  HEENT: No dry eyes, no dry mouth, no Raynaud's, no nasal ulcers, no parotid swelling, no neck pain, no jaw pain, no temple pain  Eyes: No visual changes,   CVS: No chest pain, no heart disease  RS: No SOB, no Cough, No Pleurtic pain,   GI: No nausea, no vomiiting, no abominal pain, no hx of ulcer, no gastritis, no heartburn, no dyshpagia, no BRBPR or melena  : no dysuria, no hx of miscarriages, no DVT Hx, no hx of OCP,   Neuro: No numbness or tingling, no headache, no hx of seizures,   Psych: no hx of anxiety or depression  ENDO: no hx of thyroid disease, no hx of DM  Joint/Muscluskeltal: see HPI,   All other ROS are negative.     EXAM:   /75 (BP Location: Left arm, Patient Position: Sitting, Cuff Size: adult)   Pulse 82   Ht 5' 4\" (1.626 m)   Wt 208 lb (94.3 kg)   LMP  (LMP Unknown)   BMI 35.70 kg/m²   GEN: AAOx3, NAD  HEENT: EOMI, PERRLA, no injection or icterus, oral mucosa moist, no oral lesions. No lymphadenopathy. No facial rash  CVS: RRR, no murmurs rubs or gallops. Equal 2+ distal pulses.   LUNGS: CTAB, no increased work of breathing  ABDOMEN:  soft NT/ND, +BS, no HSM  SKIN: No rashes or skin lesions. No nail findings  MSK:  B/L CMC squaring, nonTTP  R 3rd MCP base TTP, no triggering right now   NEURO: Cranial nerves II-XII intact grossly. 5/5 strength throughout in both upper and lower extremities, sensation intact.  PSYCH: normal mood    LABS:     Reviewed     IMAGING:     XR Left hip 2022:  CONCLUSION:   1. Mild bilateral hip osteoarthritis.   2. Osteitis pubis.   3. Partially imaged lower lumbar spondyloarthropathy.   4. Demineralization.     ASSESSMENT AND PLAN:     Bilateral hand pain  - She has pain involving both CMC  joints and her right middle finger.  The right middle finger  and it does not fully extend  - No evidence of active swelling or synovitis in her joints.  No history of psoriasis  - Plan to get further blood work for inflammatory arthritis but suspicion is low  - Plan to also get x-rays of both hands  - Advised patient we could inject her thumbs or middle finger with cortisone if her symptoms worsen.  She states that she wants to hold off on that right now  - She will continue Tylenol or meloxicam as needed    Thank you for allowing me to participate in this patients care. Pt will f/u be notified of results and to follow-up as needed.  She will come back if she needs cortisone injections in her thumbs or trigger finger    Lexii Delatorre MD  3/18/2024  12:13 PM

## 2024-03-18 NOTE — PATIENT INSTRUCTIONS
You were seen today for pain in your hands, thumbs and right middle finger  Symptoms are more from osteoarthritis, less likely rheumatoid arthritis  Blood work and x-rays today  I will send you message in [employer] regarding the results  If the thumbs or middle finger get worse we can always inject it with cortisone

## 2024-03-19 LAB — CCP IGG SERPL-ACNC: 1.2 U/ML (ref 0–6.9)

## 2024-03-21 ENCOUNTER — OFFICE VISIT (OUTPATIENT)
Dept: FAMILY MEDICINE CLINIC | Facility: CLINIC | Age: 84
End: 2024-03-21
Payer: MEDICARE

## 2024-03-21 ENCOUNTER — TELEPHONE (OUTPATIENT)
Dept: FAMILY MEDICINE CLINIC | Facility: CLINIC | Age: 84
End: 2024-03-21

## 2024-03-21 VITALS
DIASTOLIC BLOOD PRESSURE: 73 MMHG | OXYGEN SATURATION: 94 % | TEMPERATURE: 98 F | BODY MASS INDEX: 35.79 KG/M2 | WEIGHT: 209.63 LBS | SYSTOLIC BLOOD PRESSURE: 137 MMHG | HEART RATE: 81 BPM | HEIGHT: 64 IN

## 2024-03-21 DIAGNOSIS — I73.9 CLAUDICATION (HCC): ICD-10-CM

## 2024-03-21 DIAGNOSIS — Z85.3 HISTORY OF BREAST CANCER: ICD-10-CM

## 2024-03-21 DIAGNOSIS — I25.10 CORONARY ARTERY DISEASE INVOLVING NATIVE CORONARY ARTERY OF NATIVE HEART WITHOUT ANGINA PECTORIS: ICD-10-CM

## 2024-03-21 DIAGNOSIS — I10 PRIMARY HYPERTENSION: ICD-10-CM

## 2024-03-21 DIAGNOSIS — E78.2 MIXED HYPERLIPIDEMIA: ICD-10-CM

## 2024-03-21 DIAGNOSIS — N95.1 MENOPAUSAL STATE: ICD-10-CM

## 2024-03-21 DIAGNOSIS — M85.80 OSTEOPENIA, UNSPECIFIED LOCATION: ICD-10-CM

## 2024-03-21 DIAGNOSIS — Z00.00 ENCOUNTER FOR ANNUAL HEALTH EXAMINATION: Primary | ICD-10-CM

## 2024-03-21 DIAGNOSIS — E83.52 HYPERCALCEMIA: Primary | ICD-10-CM

## 2024-03-21 DIAGNOSIS — E21.0 PRIMARY HYPERPARATHYROIDISM (HCC): ICD-10-CM

## 2024-03-21 DIAGNOSIS — R09.82 POSTNASAL DRIP: ICD-10-CM

## 2024-03-21 PROCEDURE — G0439 PPPS, SUBSEQ VISIT: HCPCS | Performed by: STUDENT IN AN ORGANIZED HEALTH CARE EDUCATION/TRAINING PROGRAM

## 2024-03-21 PROCEDURE — 99212 OFFICE O/P EST SF 10 MIN: CPT | Performed by: STUDENT IN AN ORGANIZED HEALTH CARE EDUCATION/TRAINING PROGRAM

## 2024-03-21 RX ORDER — FLUTICASONE PROPIONATE 50 MCG
1 SPRAY, SUSPENSION (ML) NASAL DAILY
Qty: 1 EACH | Refills: 3 | Status: SHIPPED | OUTPATIENT
Start: 2024-03-21 | End: 2024-03-21

## 2024-03-21 RX ORDER — BENZONATATE 200 MG/1
200 CAPSULE ORAL 3 TIMES DAILY PRN
Qty: 30 CAPSULE | Refills: 0 | Status: SHIPPED | OUTPATIENT
Start: 2024-03-21 | End: 2024-03-21

## 2024-03-21 RX ORDER — FLUTICASONE PROPIONATE 50 MCG
1 SPRAY, SUSPENSION (ML) NASAL DAILY
Qty: 1 EACH | Refills: 3 | Status: SHIPPED | OUTPATIENT
Start: 2024-03-21

## 2024-03-21 RX ORDER — BENZONATATE 200 MG/1
200 CAPSULE ORAL 3 TIMES DAILY PRN
Qty: 30 CAPSULE | Refills: 0 | Status: SHIPPED | OUTPATIENT
Start: 2024-03-21

## 2024-03-21 NOTE — TELEPHONE ENCOUNTER
Pt with chronic arthralgias, found to have hypercalcemia, elevated PTH, elevated TSH (Although normal for age).  Would benefit from timely eval, especially since travelling to [place] for the next 6 months. Please assistn

## 2024-03-21 NOTE — PROGRESS NOTES
Subjective:   Chandrika Strange is a 83 year old female who presents for a Medicare Subsequent Annual Wellness visit (Pt already had Initial Annual Wellness) and scheduled follow up of multiple significant but stable problems    Pt presenting for routine physical and annual medicare wellness check. Denies any acute issues or recent illnesses. Chronic problems as below. Past medical/surgical history, family history, and social history were reviewed. Diet and exercise have been fair. Medicare screening questions per nurse were reviewed. Pt requesting annual testing and med refills.     H/o CAD  Last CT calcium 2020: 452  Seen by Cardiology yesterday  Awaiting PET stress, TTE    Desires weight loss  Limits self to 1500 calories  Can't get beneath 200lbs     Will be in [place] 4/9 - 6/11    Follows with Dr. Delatorre for b/l hand pain      History/Other:   Fall Risk Assessment:   She has been screened for Falls and is High Risk. Fall Prevention information provided to patient in After Visit Summary.    Do you feel unsteady when standing or walking?: No  Do you worry about falling?: Yes  Have you fallen in the past year?: Yes  How many times have you fallen?: (P) 1  Were you injured?: (P) Yes     Cognitive Assessment:   She had a completely normal cognitive assessment - see flowsheet entries     Functional Ability/Status:   Chandrika Strange has some abnormal functions as listed below:  She has Vision problems based on screening of functional status.       Depression Screening (PHQ-2/PHQ-9): PHQ-2 SCORE: 0  , done 3/20/2024        <5 minutes spent screening and counseling for depression    Advanced Directives:   She does have a Living Will but we do NOT have it on file in Epic.    She does have a POA but we do NOT have it on file in [facility].    Patient has Advance Care Planning documents but we do not have a copy in EMR. Discussed Advanced Care Planning with patient and instructed patient to get our office a copy to be scanned into  EMR.      Patient Active Problem List   Diagnosis    Family history of colon cancer    History of colon polyps    Hypertension    History of breast cancer    Menopausal state    Medicare annual wellness visit, subsequent    Mixed hyperlipidemia    Body mass index (BMI) of 33.0-33.9 in adult    Asymptomatic microscopic hematuria    Coronary artery calcification    Coronary artery disease involving native coronary artery of native heart without angina pectoris    Claudication (HCC)    Carotid atherosclerosis, bilateral    Morbid (severe) obesity due to excess calories (HCC)    Chronic pain of left knee    Malignant neoplasm of breast (female) (HCC)    Serrated adenoma of colon     Allergies:  She has No Known Allergies.    Current Medications:  Outpatient Medications Marked as Taking for the 3/21/24 encounter (Office Visit) with Caron Amezquita MD   Medication Sig    fluticasone propionate 50 MCG/ACT Nasal Suspension 1 spray by Nasal route daily. One spray per each nostril daily.    benzonatate 200 MG Oral Cap Take 1 capsule (200 mg total) by mouth 3 (three) times daily as needed for cough.    lisinopril-hydroCHLOROthiazide 20-25 MG Oral Tab Take 1 tablet by mouth daily.    rosuvastatin 20 MG Oral Tab Take 1 tablet (20 mg total) by mouth nightly.    Zinc Sulfate (ZINC 15 OR) Take by mouth.    Boswellia Hermila (BOSWELLIA OR) Take by mouth.    Coenzyme Q10 (COQ10 OR) Take  by mouth.    cholecalciferol 25 MCG (1000 UT) Oral Cap Take  by mouth.    omega-3 fatty acids (FISH OIL) 1000 MG Oral Cap Take 1,000 mg by mouth daily.       Medical History:  She  has a past medical history of Breast cancer (HCC) (2006), Coronary atherosclerosis, High blood pressure, High cholesterol, History of perforated ear drum, and Unspecified essential hypertension.  Surgical History:  She  has a past surgical history that includes other surgical history; other surgical history; other surgical history; lumpectomy left (2007); radiation  left (); ; colonoscopy (N/A, 3/5/2018); and colonoscopy (N/A, 2022).   Family History:  Her family history includes Breast Cancer (age of onset: 65) in her self; Cancer in her father and sister; Other in her mother.  Social History:  She  reports that she has never smoked. She has never used smokeless tobacco. She reports current alcohol use. She reports that she does not use drugs.    Tobacco:  She has never smoked tobacco.    CAGE Alcohol Screen:   CAGE screening score of 0 on 3/20/2024, showing low risk of alcohol abuse.      Patient Care Team:  Caron Amezquita MD as PCP - General (Family Medicine)  Radha Lopes PT as Physical Therapist (Physical Therapy)  Brunner, Heather, PT as Physical Therapist (Physical Therapy)    Review of Systems     Negative except hand pain    Objective:   Physical Exam  General appearance: alert, appears stated age, and cooperative  Head: Normocephalic, without obvious abnormality, atraumatic  Eyes: negative  Ears: normal TM's and external ear canals both ears  Nose: no discharge  Throat: lips, mucosa, and tongue normal; teeth and gums normal  Neck: no adenopathy, supple, symmetrical, trachea midline, and thyroid not enlarged, symmetric, no tenderness/mass/nodules  Back: negative  Lungs: clear to auscultation bilaterally  Breasts:   dererred  Heart: regular rate and rhythm  Abdomen: soft, non-tender; bowel sounds normal; no masses,  no organomegaly  Pelvic: deferred  Extremities: extremities normal, atraumatic, no cyanosis or edema  Pulses: 2+ and symmetric  Skin: Skin color, texture, turgor normal. No rashes or lesions  Lymph nodes:  negative  Neurologic: Grossly normal    /73   Pulse 81   Temp 98.2 °F (36.8 °C) (Temporal)   Ht 5' 4\" (1.626 m)   Wt 209 lb 9.6 oz (95.1 kg)   LMP  (LMP Unknown)   SpO2 94%   BMI 35.98 kg/m²  Estimated body mass index is 35.98 kg/m² as calculated from the following:    Height as of this encounter: 5' 4\" (1.626 m).     Weight as of this encounter: 209 lb 9.6 oz (95.1 kg).    Medicare Hearing Assessment:   Hearing Screening    Screening Method: Questionnaire  I have a problem hearing over the telephone: No I have trouble following the conversations when two or more people are talking at the same time: No   I have trouble understanding things on the TV: No I have to strain to understand conversations: No   I have to worry about missing the telephone ring or doorbell: No I have trouble hearing conversations in a noisy background such as a crowded room or restaurant: No   I get confused about where sounds come from: No I misunderstand some words in a sentence and need to ask people to repeat themselves: No   I especially have trouble understanding the speech of women and children: No I have trouble understanding the speaker in a large room such as at a meeting or place of L[employer] day: No   Many people I talk to seem to mumble (or don't speak clearly): No People get annoyed because I misunderstand what they say: No   I misunderstand what others are saying and make inappropriate responses: No I avoid social activities because I cannot hear well and fear I will reply improperly: No   Family members and friends have told me they think I may have hearing loss: No                   Assessment & Plan:   Chandrika Strange is a 83 year old female who presents for a Medicare Assessment.     1. Encounter for annual health examination (Primary)  Healthy physical exam. Advised to exercise regularly, monitor diet and weight, and follow-up as directed. Will check labs. Continue current medications. Regular follow-up with Cardiology. Annual Medicare physical.  Discussed preventative screenings  - recent labs reviewed  - encouraged to continue diet/exercise for overall wellness  - follow-up with eye doctor annually  - follow-up with dentist every 6 months  - return yearly for physicals  - annual flu shot  2. Postnasal drip  - demonstrated how to administer  Flonase medication  - avoid triggers as able  - discussed role of Tessalon perles  - increase fluid hydration and rest as tolerated  - to call with any questions or concerns  -     Discontinue: Fluticasone Propionate; 1 spray by Nasal route daily. One spray per each nostril daily.  Dispense: 1 each; Refill: 3  -     Discontinue: Benzonatate; Take 1 capsule (200 mg total) by mouth 3 (three) times daily as needed for cough.  Dispense: 30 capsule; Refill: 0  -     Fluticasone Propionate; 1 spray by Nasal route daily. One spray per each nostril daily.  Dispense: 1 each; Refill: 3  -     Benzonatate; Take 1 capsule (200 mg total) by mouth 3 (three) times daily as needed for cough.  Dispense: 30 capsule; Refill: 0  3. Claudication (HCC)  Stable, CTM  4. History of breast cancer  Last Mammo 6/2023  Overview:  11/6/06-PATHOLOGY-Core biopsy of left lower outer quandrant breast leasion: 1. A 3.0 mm area of infiltrating well-differentiated ductal carcinoma (nuclear grade 1; 1 mitosis per three high power field,  Scarff-Capellan-Kirkland grade 1) is present.  2.  No microcalcifications or unequivocal histiologic evidence of tumor vascular or lymphatic invasion can be identified.   infiltrating ductal / tubular, pT1c N0 M0 left breast cancer.  She was treated with lumpectomy, sentinel node, radiation therapy and hormonal therapy for 5 years.    5. Menopausal state  Stable, last DEXA 4/2022  6. Mixed hyperlipidemia  - discussed healthy diet and lifestyle changes for overall wellness, which includes decreased carb and sugar intake, increased fiber intake, and increased water intake as tolerated, as well as regular exercise  - continue statin dosing  7. Coronary artery disease involving native coronary artery of native heart without angina pectoris  Followed by Cardiology  Awaiting cardiac testing for surveillance  May consider role of Wegovy for cardiac benefits, weight loss  8. Primary hypertension  In-office BP stable, pt  otherwise asymptomatic  - discussed benefits of DASH diet and daily activity  - continue BP monitoring  - continue daily medication  - labs reviewed  - advised to call if BP is persistently elevated  Overview:  Body mass index is 35.83 kg/m².   9. Osteopenia, unspecified location  Continue calcium (1200mg) and vit D (2000IU) supplementation, as well as regular activity.    -     XR DEXA BONE DENSITOMETRY (CPT=77080); Future; Expected date: 03/21/2024  10. Primary hyperparathyroidism (HCC)  Recent elevated calcium, elevated PTH  Advised to follow-up with Endo for continued evaluation  -     XR DEXA BONE DENSITOMETRY (CPT=77080); Future; Expected date: 03/21/2024  The patient indicates understanding of these issues and agrees to the plan.  Reinforced healthy diet, lifestyle, and exercise.      Return for Wellness Visit.     Caron Amezquita MD, 3/21/2024     Supplementary Documentation:   General Health:  In the past six months, have you lost more than 10 pounds without trying?: 2 - No  Has your appetite been poor?: No  Type of Diet: Balanced  How does the patient maintain a good energy level?: Appropriate Exercise  How would you describe your daily physical activity?: Moderate  How would you describe your current health state?: Good  How do you maintain positive mental well-being?: Social Interaction;Puzzles;Games;Visiting Friends;Visiting Family  On a scale of 0 to 10, with 0 being no pain and 10 being severe pain, what is your pain level?: 1 - (Mild)  In the past six months, have you experienced urine leakage?: 0-No  At any time do you feel concerned for the safety/well-being of yourself and/or your children, in your home or elsewhere?: No  Have you had any immunizations at another office such as Influenza, Hepatitis B, Tetanus, or Pneumococcal?: Yes         Chandrika Strange's SCREENING SCHEDULE   Tests on this list are recommended by your physician but may not be covered, or covered at this frequency, by your  insurer.   Please check with your insurance carrier before scheduling to verify coverage.   PREVENTATIVE SERVICES FREQUENCY &  COVERAGE DETAILS LAST COMPLETION DATE   Diabetes Screening    Fasting Blood Sugar /  Glucose    One screening every 12 months if never tested or if previously tested but not diagnosed with pre-diabetes   One screening every 6 months if diagnosed with pre-diabetes Lab Results   Component Value Date    GLU 82 03/06/2024        Cardiovascular Disease Screening    Lipid Panel  Cholesterol  Lipoprotein (HDL)  Triglycerides Covered every 5 years for all Medicare beneficiaries without apparent signs or symptoms of cardiovascular disease Lab Results   Component Value Date    CHOLEST 136 03/06/2024    HDL 50 03/06/2024    LDL 65 03/06/2024    TRIG 118 03/06/2024         Electrocardiogram (EKG)   Covered if needed at Welcome to Medicare, and non-screening if indicated for medical reasons 02/01/2021      Ultrasound Screening for Abdominal Aortic Aneurysm (AAA) Covered once in a lifetime for one of the following risk factors    Men who are 65-75 years old and have ever smoked    Anyone with a family history -     Colorectal Cancer Screening  Covered for ages 50-85; only need ONE of the following:    Colonoscopy   Covered every 10 years    Covered every 2 years if patient is at high risk or previous colonoscopy was abnormal 06/22/2022    Health Maintenance   Topic Date Due    Colorectal Cancer Screening  Discontinued       Flexible Sigmoidoscopy   Covered every 4 years -    Fecal Occult Blood Test Covered annually -   Bone Density Screening    Bone density screening    Covered every 2 years after age 65 if diagnosed with risk of osteoporosis or estrogen deficiency.    Covered yearly for long-term glucocorticoid medication use (Steroids) Last Dexa Scan:    XR DEXA BONE DENSITOMETRY (CPT=77080) 04/04/2022      No recommendations at this time   Pap and Pelvic    Pap   Covered every 2 years for women at  normal risk; Annually if at high risk -  No recommendations at this time    Chlamydia Annually if high risk -  No recommendations at this time   Screening Mammogram    Mammogram     Recommend annually for all female patients aged 40 and older    One baseline mammogram covered for patients aged 35-39 06/22/2023    No recommendations at this time    Immunizations    Influenza Covered once per flu season  Please get every year -  Influenza Vaccine(1) Never done    Pneumococcal Each vaccine ([id number] & [id number]) covered once after 65 Prevnar 13: 07/06/2017    Rbismfbot45: 11/16/2018     No recommendations at this time    Hepatitis B One screening covered for patients with certain risk factors   -  No recommendations at this time    Tetanus Toxoid Not covered by Medicare Part B unless medically necessary (cut with metal); may be covered with your pharmacy prescription benefits -    Tetanus, Diptheria and Pertusis TD and TDaP Not covered by Medicare Part B -  No recommendations at this time    Zoster Not covered by Medicare Part B; may be covered with your pharmacy  prescription benefits 11/18/2015  Zoster Vaccines(1 of 2) due on 01/13/2016     Annual Monitoring of Persistent Medications (ACE/ARB, digoxin diuretics, anticonvulsants)    Potassium Annually Lab Results   Component Value Date    K 3.7 03/06/2024         Creatinine   Annually Lab Results   Component Value Date    CREATSERUM 0.84 03/06/2024         BUN Annually Lab Results   Component Value Date    BUN 17 03/06/2024       Drug Serum Conc Annually No results found for: \"DIGOXIN\", \"DIG\", \"VALP\"

## 2024-03-21 NOTE — PATIENT INSTRUCTIONS
Chandrika Strange's SCREENING SCHEDULE   Tests on this list are recommended by your physician but may not be covered, or covered at this frequency, by your insurer.   Please check with your insurance carrier before scheduling to verify coverage.   PREVENTATIVE SERVICES FREQUENCY &  COVERAGE DETAILS LAST COMPLETION DATE   Diabetes Screening    Fasting Blood Sugar /  Glucose    One screening every 12 months if never tested or if previously tested but not diagnosed with pre-diabetes   One screening every 6 months if diagnosed with pre-diabetes Lab Results   Component Value Date    GLU 82 03/06/2024        Cardiovascular Disease Screening    Lipid Panel  Cholesterol  Lipoprotein (HDL)  Triglycerides Covered every 5 years for all Medicare beneficiaries without apparent signs or symptoms of cardiovascular disease Lab Results   Component Value Date    CHOLEST 136 03/06/2024    HDL 50 03/06/2024    LDL 65 03/06/2024    TRIG 118 03/06/2024         Electrocardiogram (EKG)   Covered if needed at Welcome to Medicare, and non-screening if indicated for medical reasons 02/01/2021      Ultrasound Screening for Abdominal Aortic Aneurysm (AAA) Covered once in a lifetime for one of the following risk factors   • Men who are 65-75 years old and have ever smoked   • Anyone with a family history -     Colorectal Cancer Screening  Covered for ages 50-85; only need ONE of the following:    Colonoscopy   Covered every 10 years    Covered every 2 years if patient is at high risk or previous colonoscopy was abnormal 06/22/2022    Health Maintenance   Topic Date Due   • Colorectal Cancer Screening  Discontinued       Flexible Sigmoidoscopy   Covered every 4 years -    Fecal Occult Blood Test Covered annually -   Bone Density Screening    Bone density screening    Covered every 2 years after age 65 if diagnosed with risk of osteoporosis or estrogen deficiency.    Covered yearly for long-term glucocorticoid medication use (Steroids) Last Dexa  Scan:    XR DEXA BONE DENSITOMETRY (CPT=77080) 04/04/2022      No recommendations at this time   Pap and Pelvic    Pap   Covered every 2 years for women at normal risk; Annually if at high risk -  No recommendations at this time    Chlamydia Annually if high risk -  No recommendations at this time   Screening Mammogram    Mammogram     Recommend annually for all female patients aged 40 and older    One baseline mammogram covered for patients aged 35-39 06/22/2023    No recommendations at this time    Immunizations    Influenza Covered once per flu season  Please get every year -  Influenza Vaccine(1) Never done    Pneumococcal Each vaccine ([id number] & [id number]) covered once after 65 Prevnar 13: 07/06/2017    Bevfohdks78: 11/16/2018     No recommendations at this time    Hepatitis B One screening covered for patients with certain risk factors   -  No recommendations at this time    Tetanus Toxoid Not covered by Medicare Part B unless medically necessary (cut with metal); may be covered with your pharmacy prescription benefits -    Tetanus, Diptheria and Pertusis TD and TDaP Not covered by Medicare Part B -  No recommendations at this time    Zoster Not covered by Medicare Part B; may be covered with your pharmacy  prescription benefits 11/18/2015  Zoster Vaccines(1 of 2) due on 01/13/2016     Annual Monitoring of Persistent Medications (ACE/ARB, digoxin diuretics, anticonvulsants)    Potassium Annually Lab Results   Component Value Date    K 3.7 03/06/2024         Creatinine   Annually Lab Results   Component Value Date    CREATSERUM 0.84 03/06/2024         BUN Annually Lab Results   Component Value Date    BUN 17 03/06/2024       Drug Serum Conc Annually No results found for: \"DIGOXIN\", \"DIG\", \"VALP\"

## 2024-03-29 ENCOUNTER — TELEPHONE (OUTPATIENT)
Dept: RHEUMATOLOGY | Facility: CLINIC | Age: 84
End: 2024-03-29

## 2024-03-29 DIAGNOSIS — M79.642 BILATERAL HAND PAIN: Primary | ICD-10-CM

## 2024-03-29 DIAGNOSIS — M79.641 BILATERAL HAND PAIN: Primary | ICD-10-CM

## 2024-03-29 NOTE — TELEPHONE ENCOUNTER
Discussed results with patient.  She was seen for hand pain but appears more osteoarthritis, rheumatoid factor was slightly positive.  Plan to get ultrasound of the hands

## 2024-04-02 ENCOUNTER — OFFICE VISIT (OUTPATIENT)
Facility: LOCATION | Age: 84
End: 2024-04-02

## 2024-04-02 VITALS
OXYGEN SATURATION: 95 % | HEIGHT: 64 IN | BODY MASS INDEX: 35.34 KG/M2 | HEART RATE: 77 BPM | WEIGHT: 207 LBS | SYSTOLIC BLOOD PRESSURE: 144 MMHG | DIASTOLIC BLOOD PRESSURE: 70 MMHG

## 2024-04-02 DIAGNOSIS — E78.2 MIXED HYPERLIPIDEMIA: ICD-10-CM

## 2024-04-02 DIAGNOSIS — E66.9 CLASS 2 OBESITY WITH BODY MASS INDEX (BMI) OF 35.0 TO 35.9 IN ADULT, UNSPECIFIED OBESITY TYPE, UNSPECIFIED WHETHER SERIOUS COMORBIDITY PRESENT: ICD-10-CM

## 2024-04-02 DIAGNOSIS — E03.8 SUBCLINICAL HYPOTHYROIDISM: ICD-10-CM

## 2024-04-02 DIAGNOSIS — E83.52 HYPERCALCEMIA: ICD-10-CM

## 2024-04-02 DIAGNOSIS — I10 PRIMARY HYPERTENSION: ICD-10-CM

## 2024-04-02 DIAGNOSIS — I25.10 CORONARY ARTERY DISEASE INVOLVING NATIVE CORONARY ARTERY OF NATIVE HEART WITHOUT ANGINA PECTORIS: ICD-10-CM

## 2024-04-02 DIAGNOSIS — C50.919 MALIGNANT NEOPLASM OF FEMALE BREAST, UNSPECIFIED ESTROGEN RECEPTOR STATUS, UNSPECIFIED LATERALITY, UNSPECIFIED SITE OF BREAST (HCC): ICD-10-CM

## 2024-04-02 DIAGNOSIS — R79.89 LOW VITAMIN D LEVEL: ICD-10-CM

## 2024-04-02 DIAGNOSIS — E21.3 HYPERPARATHYROIDISM (HCC): Primary | ICD-10-CM

## 2024-04-02 DIAGNOSIS — I65.23 CAROTID ATHEROSCLEROSIS, BILATERAL: ICD-10-CM

## 2024-04-02 DIAGNOSIS — E66.01 MORBID (SEVERE) OBESITY DUE TO EXCESS CALORIES (HCC): ICD-10-CM

## 2024-04-02 DIAGNOSIS — M81.0 AGE-RELATED OSTEOPOROSIS WITHOUT CURRENT PATHOLOGICAL FRACTURE: ICD-10-CM

## 2024-04-02 PROBLEM — E66.812 CLASS 2 OBESITY WITH BODY MASS INDEX (BMI) OF 35.0 TO 35.9 IN ADULT: Status: ACTIVE | Noted: 2024-04-02

## 2024-04-02 PROCEDURE — 99205 OFFICE O/P NEW HI 60 MIN: CPT | Performed by: INTERNAL MEDICINE

## 2024-04-02 PROCEDURE — 99499 UNLISTED E&M SERVICE: CPT | Performed by: INTERNAL MEDICINE

## 2024-04-02 NOTE — PATIENT INSTRUCTIONS
RTC after completing w/u   We discussed risk of fracture with the pt   She is planning a trip to [place] but will plan an early return since she said she cannot delay her trip for the w/u   DXA now with FRAX   Fasting AM labs - water is ok  24 hr urine Ca/creat  We need dental clearance before we start treatment for osteoporosis     Take vitamin D  2000 international unit a day and calcium 600 mg twice a day or 3 servings of dairy a day   Do weight bearing exercise   Follow fall precautions   Discussed options oral bisphosphonate (will avoid it since you have acid reflux symptoms) vs infusion bisphosphonate vs Denosumab     educational material     https://www.[employer]/contents/medicines-for-osteoporosis-the-basics?search=osteoporosis&source=search_result&selectedTitle=2~150&usage_type=default&display_rank=2    https://www.[employer]/contents/osteoporosis-the-basics?search=osteoporosis&source=search_result&selectedTitle=1~150&usage_type=default&display_rank=1    https://www.[employer]/contents/zoledronic-acid-patient-drug-information?search=zoledronic%20acid&source=panel_search_result&selectedTitle=1~148&usage_type=panel&kp_tab=drug_patient&display_rank=1    We discussed diagnosis, treatment options, fall risk, long term complications and side effect.   Side effect from osteoporosis meds   Hypocalcemia: Adequately supplement calcium and vitamin D during  Osteonecrosis of the Jaw: Monitor for symptoms.   Atypical Femoral Fracture: Evaluate new or unusual thigh, hip, or groin      How to Collect the 24-hour Urine Specimen  Decide on a day to do the test.  On the day of the test, patient empty bladder (urinate or pee) in the toilet right after waking up. Flush the urine down the toilet.  The test begins now with the bladder empty. Write this date and the start time on the storage container’s label.  For the next 24 hours, patient will need to pee into a collection container every time they go to the bathroom.  Females can use a toilet hat. Males can use a plastic urinal or pee right into the large storage container. If you do not have a toilet hat or urinal at home, you may use some other clean plastic container- or get them from labs   Before using the plastic container for the first time, wash it with dish soap and then rinse at least 10 times with tap water. Allow it to air dry completely.  Do not let feces (poop) mix with the urine or else the test will need to be restarted.  Pour the urine into the large storage container and close the lid tightly. Be very careful not to spill any urine.  If using a collection container, rinse it with water only. Put it back by the toilet to remind you to use it the next time. Allow it to air dry completely.  Put the large storage container in the refrigerator ( or in put ice around the container and place in larger container). The urine must be kept cool at all times. If you do not have space in the refrigerator, you can store it in a cooler on top of Add more ice as needed to keep the urine cold.  Each time patient pees during the day and night, follow steps to collect urine.  The next day (close to the same time that you started on the first day), patient needs to pee into the collection container one last time. Add it to the large storage container. This ends the 24-hour collection.  Write the date and time of this last urine collection on the label.  Attach a list of all medicines, including over-the-counter medicines, vitamins and herbal remedies, patient took during the 24-hour urine collection.    Take the urine to a Laboratory (Lab) Service Center as soon as possible, within 24 hours after ending the collection. Keep the urine cool.  Make sure the urine does not freeze for these tests: amylase, arylsulfatase, immunoelectrophoresis, micro-albumin, pregnanetriol, protein or uric acid.  You will need to start the test over if any of the urine spilled, you forgot to save some or  it has poop in it. If you must restart the test, it is okay to use the same collection and storage containers. Pour out the urine, clean the containers well and allow them to air dry. Then follow

## 2024-04-02 NOTE — PROGRESS NOTES
New Patient Evaluation - History and Physical    CONSULT - Reason for Visit:  hypercalcemia, hyperparathyroid, osteoporosis with high FRAX.    Requesting Physician:   ..Caron Amezquita MD    CHIEF COMPLAINT:    Chief Complaint   Patient presents with    Consult      Hypercalcemia        HISTORY OF PRESENT ILLNESS:   Chandrika Strange is a 83 year old female who presents with hypercalcemia, hyperparathyroid,  osteoporosis with high FRAX  Breast ca s/p radiation, surgery and chemo.   She took aromatase inhibitors for 5 yrs -?2006    She was dx with osteopenia on DXA in 2022. I reviewed and d/w pt her result osteopenia with FRAX 19 and 10%   pending DXA now  Patient is on fall precaution.  Patient is taking Vitamin D3 1000 unit/day   Not taking calcium. No regular tums  use.   Likes dairy but not consuming it daily  Walks and doing balance exercise   Educated the patient to do wt-bearing exercise, but avoid falls.  Last dental appt 2023     Denied Polyuria, Polydipsia, Nephrolithiasis,    Gastrointestinal: Denied Anorexia, nausea, vomiting, Bowel hypomotility and constipation, Pancreatitis, Peptic ulcer disease,   Musculoskeletal: Denied Muscle weakness, Bone pain,   Neurologic: Denied decreased concentration, Confusion, Fatigue, Stupor,   Fragility fracture: no   Height loss:  0.5 inch  Denied risk factors: steroid use, alcohol abuse, liver disease, kidney disease, hyperthyroid, seizure medications.   Family history of osteoporosis or fragility fracture: no but  mother had kidney  stones       ASSESSMENT AND PLAN:  female who presents with hypercalcemia, hyperparathyroid,  osteoporosis with high FRAX    She has multiple risk factors for osteoporosis - post MP, woman, breast ca, status AI use, high FRAX on previous DXA, recent w/u is concerning for primary hyperparathyroid with high ca and high PTH.     I reviewed and discussed her w/u. She has high calcium 11.1 levels trending up slowly in the last few yrs, eGFR 69 ,  albumin 4.4 and high . Vit D is low 28 but PTH and ca are high as well.     plan  RTC after completing w/u   We discussed risk of fracture with the pt. She is planning a trip to [place] but will plan an early return since she said she cannot delay her trip for the w/u   DXA now with FRAX   Fasting AM labs - water is ok  24 hr urine Ca/creat  We need dental clearance before we start treatment for osteoporosis     Take vitamin D  2000 international unit a day and calcium 600 mg twice a day or 3 servings of dairy a day   Do weight bearing exercise   Follow fall precautions   Discussed options oral bisphosphonate (will avoid it since you have acid reflux symptoms) vs infusion bisphosphonate vs Denosumab     educational material     https://www.[employer]/contents/medicines-for-osteoporosis-the-basics?search=osteoporosis&source=search_result&selectedTitle=2~150&usage_type=default&display_rank=2    https://www.[employer]/contents/osteoporosis-the-basics?search=osteoporosis&source=search_result&selectedTitle=1~150&usage_type=default&display_rank=1    https://www.[employer]/contents/zoledronic-acid-patient-drug-information?search=zoledronic%20acid&source=panel_search_result&selectedTitle=1~148&usage_type=panel&kp_tab=drug_patient&display_rank=1    We discussed diagnosis, treatment options, fall risk, long term complications and side effect.   Side effect from osteoporosis meds   Hypocalcemia: Adequately supplement calcium and vitamin D during  Osteonecrosis of the Jaw: Monitor for symptoms.   Atypical Femoral Fracture: Evaluate new or unusual thigh, hip, or groin      Educated the patient to do wt-bearing exercise, but avoid falls.     How to Collect the 24-hour Urine Specimen  Decide on a day to do the test.  On the day of the test, patient empty bladder (urinate or pee) in the toilet right after waking up. Flush the urine down the toilet.  The test begins now with the bladder empty. Write this date and the  start time on the storage container’s label.  For the next 24 hours, patient will need to pee into a collection container every time they go to the bathroom. Females can use a toilet hat. Males can use a plastic urinal or pee right into the large storage container. If you do not have a toilet hat or urinal at home, you may use some other clean plastic container- or get them from labs   Before using the plastic container for the first time, wash it with dish soap and then rinse at least 10 times with tap water. Allow it to air dry completely.  Do not let feces (poop) mix with the urine or else the test will need to be restarted.  Pour the urine into the large storage container and close the lid tightly. Be very careful not to spill any urine.  If using a collection container, rinse it with water only. Put it back by the toilet to remind you to use it the next time. Allow it to air dry completely.  Put the large storage container in the refrigerator ( or in put ice around the container and place in larger container). The urine must be kept cool at all times. If you do not have space in the refrigerator, you can store it in a cooler on top of Add more ice as needed to keep the urine cold.  Each time patient pees during the day and night, follow steps to collect urine.  The next day (close to the same time that you started on the first day), patient needs to pee into the collection container one last time. Add it to the large storage container. This ends the 24-hour collection.  Write the date and time of this last urine collection on the label.  Attach a list of all medicines, including over-the-counter medicines, vitamins and herbal remedies, patient took during the 24-hour urine collection.    Take the urine to a Laboratory (Lab) Service Center as soon as possible, within 24 hours after ending the collection. Keep the urine cool.  Make sure the urine does not freeze for these tests: amylase, arylsulfatase,  immunoelectrophoresis, micro-albumin, pregnanetriol, protein or uric acid.  You will need to start the test over if any of the urine spilled, you forgot to save some or it has poop in it. If you must restart the test, it is okay to use the same collection and storage containers. Pour out the urine, clean the containers well and allow them to air dry. Then follow         PAST MEDICAL HISTORY:   Past Medical History:   Diagnosis Date    Breast cancer (HCC) 2006    SURGERY, RADIATION    Coronary atherosclerosis     High blood pressure     High cholesterol     History of perforated ear drum     Unspecified essential hypertension        PAST SURGICAL HISTORY:   Past Surgical History:   Procedure Laterality Date    COLONOSCOPY N/A 3/5/2018    Procedure: COLONOSCOPY;  Surgeon: Patricio Lassiter MD;  Location[facility] ENDOSCOPY    COLONOSCOPY N/A 2022    Procedure: COLONOSCOPY;  Surgeon: Patricio Lassiter MD;  Location: [facility] ENDOSCOPY    LUMPECTOMY LEFT            OTHER SURGICAL HISTORY      ankle sx    OTHER SURGICAL HISTORY      ear surgery    OTHER SURGICAL HISTORY      Mastoidectomy    RADIATION LEFT         CURRENT MEDICATIONS:     Boswellia Hermila Extract Does not apply Powder       lisinopril-hydroCHLOROthiazide 20-25 MG Oral Tab Take 1 tablet by mouth daily. 90 tablet 3    rosuvastatin 20 MG Oral Tab Take 1 tablet (20 mg total) by mouth nightly.  0    Zinc Sulfate (ZINC 15 OR) Take by mouth.      aspirin 81 MG Oral Tab EC Take 1 tablet (81 mg total) by mouth daily. 30 tablet 1    Cyanocobalamin (VITAMIN B-12 OR) Take by mouth.      Boswellia Hermila (BOSWELLIA OR) Take by mouth.      Coenzyme Q10 (COQ10 OR) Take  by mouth.      Multiple Vitamin (MULTIVITAMINS) Oral Cap Take by mouth.      omega-3 fatty acids (FISH OIL) 1000 MG Oral Cap Take 1,000 mg by mouth daily.         ALLERGIES:  No Known Allergies    SOCIAL HISTORY:    Social History     Socioeconomic History    Marital status:     Tobacco Use    Smoking status: Never    Smokeless tobacco: Never   Vaping Use    Vaping Use: Never used   Substance and Sexual Activity    Alcohol use: Yes     Alcohol/week: 0.0 standard drinks of alcohol     Comment: 1-3 glasses of wine/week    Drug use: No    Sexual activity: Not Currently     Partners: Male   Other Topics Concern    Caffeine Concern Yes     Comment: Coffee    Reaction to local anesthetic No       FAMILY HISTORY:   Family History   Problem Relation Age of Onset    Cancer Father         Cancer-brain    Other (Other) Mother     Cancer Sister         Cancer-colon    Breast Cancer Self 66        lt breast        REVIEW OF SYSTEMS:  All negative other than HPI    PHYSICAL EXAM:   Height: 5' 4\" (162.6 cm) (04/02 1024)  Weight: 207 lb (93.9 kg) (04/02 1024)  BSA (Calculated - sq m): 1.98 sq meters (04/02 1024)  Pulse: 77 (04/02 1024)  BP: 144/70 (04/02 1024)  Temp: --  Do Not Use - Resp Rate: --  SpO2: 95 % (04/02 1024)    Body mass index is 35.53 kg/m².  No spine tenderness     CONSTITUTIONAL:  Awake and alert. Age appropriate, good hygiene not in acute distress. Well-nourished and well developed. no acute distress   PSYCH:   Orientated to time, place, person & situation, Normal mood and affect, memory intact, normal insight and judgment, cooperative  Neuro: speech is clear. Awake, alert, no aphasia, no facial asymmetry, no nuchal rigidity  EYES:  No proptosis, no ptosis, conjunctiva normal  ENT:  Normocephalic, atraumatic  Eye: EOMI, normal lids, no discharge, no conjunctival erythema. No exophthalmos/proptosis, Ptosis negative   No rhinorrhea, moist oral mucosa  Neck: full range of motion  Neck/Thyroid: neck inspection: normal, No scar, No goiter   LUNGS:  No acute respiratory distress, non-labored respiration. Speaking full sentences  CARDIOVASCULAR:  regular rate   ABDOMEN:  No abdominal pain.   SKIN:  no bruising or bleeding, no rashes and no lesions, Skin is dry, no obvious rashes or  lesions  EXTREMITIES: no gross abnormality   MSK: Moves extremities spontaneously. full range of motion in all major joints      DATA:     Pertinent data reviewed   Pending DXA now  4/2022 DXA osteopenia.  The 10 year fracture risk for major osteoporotic fracture is 19%, and for hip fracture is 10.0%.         Latest Reference Range & Units 02/28/23 07:31 03/06/24 06:25   CALCIUM 8.7 - 10.4 mg/dL 10.4 (H) 11.1 (H)   BUN/CREATININE RATIO 10.0 - 20.0  20.5 (H) 20.2 (H)   EGFR >=60 mL/min/1.73m2 66 69      Latest Reference Range & Units 02/28/23 07:31 03/06/24 06:25   ALKALINE PHOSPHATASE 55 - 142 U/L 71 66      Latest Reference Range & Units 02/28/23 07:31 03/06/24 06:25   Albumin 3.2 - 4.8 g/dL 3.6 4.4   VITAMIN D, 25-OH, TOTAL 30.0 - 100.0 ng/mL  28.5 (L)      Latest Reference Range & Units 02/28/23 07:31 03/06/24 06:25 03/18/24 13:02   T4,Free (Direct) 0.8 - 1.7 ng/dL 0.8 0.9    TSH 0.550 - 4.780 mIU/mL 4.280 (H) 5.333 (H)    PTH INTACT 18.5 - 88.0 pg/mL   158.4 (H)         No results for input(s): \"TSH\", \"T4F\", \"T3F\", \"THYP\" in the last 72 hours.  No results found.    Orders Placed This Encounter   Procedures    PTH, Intact    Comp Metabolic Panel (14)    Magnesium    Phosphorus    Creatinine, 24-Hour Urine    Calcium, 24Hr Urine    Calcium     Orders Placed This Encounter    PTH, Intact     Standing Status:   Future     Standing Expiration Date:   4/2/2025    Comp Metabolic Panel (14)     Standing Status:   Future     Standing Expiration Date:   4/2/2025     Order Specific Question:   Release to patient     Answer:   Immediate    Magnesium     Standing Status:   Future     Standing Expiration Date:   4/2/2025     Order Specific Question:   Release to patient     Answer:   Immediate    Phosphorus     Standing Status:   Future     Standing Expiration Date:   4/2/2025     Order Specific Question:   Release to patient     Answer:   Immediate    Creatinine, 24-Hour Urine     Standing Status:   Future     Standing  Expiration Date:   4/2/2025     Order Specific Question:   Release to patient     Answer:   Immediate    Calcium, 24Hr Urine     Standing Status:   Future     Standing Expiration Date:   4/2/2025     Order Specific Question:   Release to patient     Answer:   Immediate    Calcium     Standing Status:   Future     Standing Expiration Date:   4/2/2025     Order Specific Question:   Release to patient     Answer:   Immediate          This is a specialized patient consultation in endocrinology and required comprehensive review of prior records, as well as current evaluation, with time required for consideration of complex endocrine issues and consultation. For this visit, I personally interviewed the patient, and family member if accompanied, performed the pertinent parts of the history and physical examination. ROS included screening for appropriate endocrine conditions.   Today's diagnosis and plan were reviewed in detail with the patient who states understanding and agrees with plan. I discussed with the patient possible diagnosis, differential diagnosis, need for work up, treatment options, alternatives and side effects.     Please see note for details about time spent which includes:   · pre-visit preparation  · reviewing records  · face to face time with the patient   · timely documentation of the encounter  · ordering medications/tests  · communication with care team  · care coordination    I appreciate the opportunity to be part of your patient's medical care and will keep you, as the referring and primary physicians, informed about the care of your patient. Please feel free to contact me should you have any questions.      Claudio Beth MD

## 2024-04-04 ENCOUNTER — LAB ENCOUNTER (OUTPATIENT)
Dept: LAB | Facility: HOSPITAL | Age: 84
End: 2024-04-04
Attending: INTERNAL MEDICINE
Payer: MEDICARE

## 2024-04-04 DIAGNOSIS — E83.52 HYPERCALCEMIA: ICD-10-CM

## 2024-04-04 DIAGNOSIS — E66.01 MORBID (SEVERE) OBESITY DUE TO EXCESS CALORIES (HCC): ICD-10-CM

## 2024-04-04 DIAGNOSIS — E21.3 HYPERPARATHYROIDISM (HCC): ICD-10-CM

## 2024-04-04 DIAGNOSIS — E66.9 CLASS 2 OBESITY WITH BODY MASS INDEX (BMI) OF 35.0 TO 35.9 IN ADULT, UNSPECIFIED OBESITY TYPE, UNSPECIFIED WHETHER SERIOUS COMORBIDITY PRESENT: ICD-10-CM

## 2024-04-04 DIAGNOSIS — M81.0 AGE-RELATED OSTEOPOROSIS WITHOUT CURRENT PATHOLOGICAL FRACTURE: ICD-10-CM

## 2024-04-04 DIAGNOSIS — I25.10 CORONARY ARTERY DISEASE INVOLVING NATIVE CORONARY ARTERY OF NATIVE HEART WITHOUT ANGINA PECTORIS: ICD-10-CM

## 2024-04-04 DIAGNOSIS — E03.8 SUBCLINICAL HYPOTHYROIDISM: ICD-10-CM

## 2024-04-04 DIAGNOSIS — R79.89 LOW VITAMIN D LEVEL: ICD-10-CM

## 2024-04-04 DIAGNOSIS — I65.23 CAROTID ATHEROSCLEROSIS, BILATERAL: ICD-10-CM

## 2024-04-04 DIAGNOSIS — C50.919 MALIGNANT NEOPLASM OF FEMALE BREAST, UNSPECIFIED ESTROGEN RECEPTOR STATUS, UNSPECIFIED LATERALITY, UNSPECIFIED SITE OF BREAST (HCC): ICD-10-CM

## 2024-04-04 DIAGNOSIS — E78.2 MIXED HYPERLIPIDEMIA: ICD-10-CM

## 2024-04-04 DIAGNOSIS — I10 PRIMARY HYPERTENSION: ICD-10-CM

## 2024-04-04 LAB
ALBUMIN SERPL-MCNC: 4.3 G/DL (ref 3.2–4.8)
ALBUMIN/GLOB SERPL: 1.7 {RATIO} (ref 1–2)
ALP LIVER SERPL-CCNC: 68 U/L
ALT SERPL-CCNC: 20 U/L
ANION GAP SERPL CALC-SCNC: 5 MMOL/L (ref 0–18)
AST SERPL-CCNC: 24 U/L (ref ?–34)
BILIRUB SERPL-MCNC: 0.6 MG/DL (ref 0.2–1.1)
BUN BLD-MCNC: 17 MG/DL (ref 9–23)
BUN/CREAT SERPL: 20.2 (ref 10–20)
CALCIUM 24H UR-SRATE: 185 MG/24HR (ref 100–300)
CALCIUM BLD-MCNC: 11.2 MG/DL (ref 8.7–10.4)
CHLORIDE SERPL-SCNC: 109 MMOL/L (ref 98–112)
CO2 SERPL-SCNC: 30 MMOL/L (ref 21–32)
CREAT BLD-MCNC: 0.84 MG/DL
CREAT UR-SCNC: 0.99 G/24 HR (ref 0.6–1.8)
EGFRCR SERPLBLD CKD-EPI 2021: 69 ML/MIN/1.73M2 (ref 60–?)
FASTING STATUS PATIENT QL REPORTED: YES
GLOBULIN PLAS-MCNC: 2.5 G/DL (ref 2.8–4.4)
GLUCOSE BLD-MCNC: 87 MG/DL (ref 70–99)
MAGNESIUM SERPL-MCNC: 1.9 MG/DL (ref 1.6–2.6)
OSMOLALITY SERPL CALC.SUM OF ELEC: 299 MOSM/KG (ref 275–295)
PHOSPHATE SERPL-MCNC: 2.8 MG/DL (ref 2.4–5.1)
POTASSIUM SERPL-SCNC: 3.8 MMOL/L (ref 3.5–5.1)
PROT SERPL-MCNC: 6.8 G/DL (ref 5.7–8.2)
PTH-INTACT SERPL-MCNC: 139.9 PG/ML (ref 18.5–88)
SODIUM SERPL-SCNC: 144 MMOL/L (ref 136–145)
SPECIMEN VOL UR: 2050 ML
SPECIMEN VOL UR: 2050 ML

## 2024-04-04 PROCEDURE — 84100 ASSAY OF PHOSPHORUS: CPT

## 2024-04-04 PROCEDURE — 83735 ASSAY OF MAGNESIUM: CPT

## 2024-04-04 PROCEDURE — 82570 ASSAY OF URINE CREATININE: CPT

## 2024-04-04 PROCEDURE — 82340 ASSAY OF CALCIUM IN URINE: CPT

## 2024-04-04 PROCEDURE — 36415 COLL VENOUS BLD VENIPUNCTURE: CPT

## 2024-04-04 PROCEDURE — 80053 COMPREHEN METABOLIC PANEL: CPT

## 2024-04-04 PROCEDURE — 83970 ASSAY OF PARATHORMONE: CPT

## 2024-05-23 ENCOUNTER — ORDER TRANSCRIPTION (OUTPATIENT)
Dept: ADMINISTRATIVE | Facility: HOSPITAL | Age: 84
End: 2024-05-23

## 2024-05-23 ENCOUNTER — TELEPHONE (OUTPATIENT)
Dept: FAMILY MEDICINE CLINIC | Facility: CLINIC | Age: 84
End: 2024-05-23

## 2024-05-23 DIAGNOSIS — Z12.31 ENCOUNTER FOR SCREENING MAMMOGRAM FOR MALIGNANT NEOPLASM OF BREAST: Primary | ICD-10-CM

## 2024-05-23 NOTE — TELEPHONE ENCOUNTER
Rosetta from [employer] calling to request order for mammogram, stated needs orders as patient has history of breast cancer.

## 2024-05-23 NOTE — TELEPHONE ENCOUNTER
Order signed and ready for patient.  Patient notified by telephone.    Last mammogram done 06/22/22023  RECOMMENDATIONS:   ROUTINE MAMMOGRAM AND CLINICAL EVALUATION IN 12 MONTHS.

## 2024-06-24 ENCOUNTER — TELEPHONE (OUTPATIENT)
Dept: FAMILY MEDICINE CLINIC | Facility: CLINIC | Age: 84
End: 2024-06-24

## 2024-06-25 ENCOUNTER — TELEPHONE (OUTPATIENT)
Dept: FAMILY MEDICINE CLINIC | Facility: CLINIC | Age: 84
End: 2024-06-25

## 2024-06-25 NOTE — TELEPHONE ENCOUNTER
Patient is requesting a follow up appointment with PCP only and mentions she was advised to schedule with PCP.  It is mentioned she received a reminder for blood pressure follow up but patient mentions there is more to discuss.   Is Provider able to see patient in July 2024.   First available appointment with provider is August 2024.    Please advise.

## 2024-06-28 ENCOUNTER — HOSPITAL ENCOUNTER (OUTPATIENT)
Dept: BONE DENSITY | Facility: HOSPITAL | Age: 84
Discharge: HOME | End: 2024-06-28
Attending: STUDENT IN AN ORGANIZED HEALTH CARE EDUCATION/TRAINING PROGRAM
Payer: MEDICARE

## 2024-06-28 DIAGNOSIS — E21.0 PRIMARY HYPERPARATHYROIDISM (HCC): ICD-10-CM

## 2024-06-28 DIAGNOSIS — M85.80 OSTEOPENIA, UNSPECIFIED LOCATION: ICD-10-CM

## 2024-06-28 PROCEDURE — 77080 DXA BONE DENSITY AXIAL: CPT | Performed by: STUDENT IN AN ORGANIZED HEALTH CARE EDUCATION/TRAINING PROGRAM

## 2024-07-01 ENCOUNTER — TELEPHONE (OUTPATIENT)
Dept: PHYSICAL MEDICINE AND REHAB | Facility: CLINIC | Age: 84
End: 2024-07-01

## 2024-07-01 ENCOUNTER — HOSPITAL ENCOUNTER (OUTPATIENT)
Dept: ULTRASOUND IMAGING | Facility: HOSPITAL | Age: 84
Discharge: HOME | End: 2024-07-01
Attending: INTERNAL MEDICINE
Payer: MEDICARE

## 2024-07-01 DIAGNOSIS — M79.642 BILATERAL HAND PAIN: ICD-10-CM

## 2024-07-01 DIAGNOSIS — M79.641 BILATERAL HAND PAIN: ICD-10-CM

## 2024-07-01 PROCEDURE — 76881 US COMPL JOINT R-T W/IMG: CPT | Performed by: INTERNAL MEDICINE

## 2024-07-01 RX ORDER — MELOXICAM 15 MG/1
15 TABLET ORAL DAILY
Qty: 30 TABLET | Refills: 0 | Status: SHIPPED | OUTPATIENT
Start: 2024-07-01

## 2024-07-01 NOTE — TELEPHONE ENCOUNTER
Refill Request    Medication request: MELOXICAM 15 MG Oral Tab. Take 1 tablet (15 mg total) by mouth daily.     LOV:8/1/2023 Raheem Sauceda,    Due back to clinic per last office note: Per Dr. Sauceda: \"Follow-up as needed.\"  NOV: Visit date not found      [place] data not available    Urine drug screen (if applicable): n/a  Pain contract: n/a    LOV plan (if weaning or changing medications): Per Dr. Sauceda: \"Continue meloxicam prn.\"

## 2024-07-01 NOTE — TELEPHONE ENCOUNTER
Patient calling to request a refill on Meloxicam 15 MG. Please send this request to her [facility] pharmacy

## 2024-07-05 ENCOUNTER — OFFICE VISIT (OUTPATIENT)
Dept: ENDOCRINOLOGY CLINIC | Facility: CLINIC | Age: 84
End: 2024-07-05

## 2024-07-05 VITALS
SYSTOLIC BLOOD PRESSURE: 112 MMHG | BODY MASS INDEX: 36.13 KG/M2 | HEIGHT: 64 IN | DIASTOLIC BLOOD PRESSURE: 72 MMHG | WEIGHT: 211.63 LBS | HEART RATE: 68 BPM

## 2024-07-05 DIAGNOSIS — R79.89 LOW VITAMIN D LEVEL: ICD-10-CM

## 2024-07-05 DIAGNOSIS — C50.919 MALIGNANT NEOPLASM OF FEMALE BREAST, UNSPECIFIED ESTROGEN RECEPTOR STATUS, UNSPECIFIED LATERALITY, UNSPECIFIED SITE OF BREAST (HCC): ICD-10-CM

## 2024-07-05 DIAGNOSIS — E03.8 SUBCLINICAL HYPOTHYROIDISM: ICD-10-CM

## 2024-07-05 DIAGNOSIS — M81.0 AGE-RELATED OSTEOPOROSIS WITHOUT CURRENT PATHOLOGICAL FRACTURE: ICD-10-CM

## 2024-07-05 DIAGNOSIS — I25.10 CORONARY ARTERY DISEASE INVOLVING NATIVE CORONARY ARTERY OF NATIVE HEART WITHOUT ANGINA PECTORIS: ICD-10-CM

## 2024-07-05 DIAGNOSIS — E83.52 HYPERCALCEMIA: ICD-10-CM

## 2024-07-05 DIAGNOSIS — E78.2 MIXED HYPERLIPIDEMIA: ICD-10-CM

## 2024-07-05 DIAGNOSIS — I65.23 CAROTID ATHEROSCLEROSIS, BILATERAL: ICD-10-CM

## 2024-07-05 DIAGNOSIS — I10 PRIMARY HYPERTENSION: ICD-10-CM

## 2024-07-05 DIAGNOSIS — E21.3 HYPERPARATHYROIDISM (HCC): Primary | ICD-10-CM

## 2024-07-05 DIAGNOSIS — E66.9 CLASS 2 OBESITY WITH BODY MASS INDEX (BMI) OF 36.0 TO 36.9 IN ADULT, UNSPECIFIED OBESITY TYPE, UNSPECIFIED WHETHER SERIOUS COMORBIDITY PRESENT: ICD-10-CM

## 2024-07-05 PROCEDURE — 99214 OFFICE O/P EST MOD 30 MIN: CPT | Performed by: INTERNAL MEDICINE

## 2024-07-05 NOTE — PATIENT INSTRUCTIONS
Plan  RTC in 1 mo     We need dental clearance before we start treatment for osteoporosis /Reclast     Discussed medicine vs surgery for parathyroid      4D CT scan for possible parathyroid adenoma   Refer to ENT to discuss surgery option     Take vitamin D  2000 international unit a day and calcium 600 mg twice a day or 3 servings of dairy a day   Do weight bearing exercise   Follow fall precautions   Discussed options oral bisphosphonate (will avoid it since you have acid reflux symptoms) vs infusion bisphosphonate vs Denosumab     educational material     https://www.[employer]/contents/medicines-for-osteoporosis-the-basics?search=osteoporosis&source=search_result&selectedTitle=2~150&usage_type=default&display_rank=2    https://www.[employer]/contents/osteoporosis-the-basics?search=osteoporosis&source=search_result&selectedTitle=1~150&usage_type=default&display_rank=1    https://www.[employer]/contents/zoledronic-acid-patient-drug-information?search=zoledronic%20acid&source=panel_search_result&selectedTitle=1~148&usage_type=panel&kp_tab=drug_patient&display_rank=1    We discussed diagnosis, treatment options, fall risk, long term complications and side effect.   Side effect from osteoporosis meds   Hypocalcemia: Adequately supplement calcium and vitamin D during  Osteonecrosis of the Jaw: Monitor for symptoms.   Atypical Femoral Fracture: Evaluate new or unusual thigh, hip, or groin      Educated the patient to do wt-bearing exercise, but avoid falls.

## 2024-07-05 NOTE — PROGRESS NOTES
Reason for Visit:  hypercalcemia, primary hyperparathyroid,  osteoporosis with high FRAX  Requesting Physician:   ..Caron Amezquita MD    CHIEF COMPLAINT:    Chief Complaint   Patient presents with    Hyperparathyroidism     Follow-up last labs 3/24.      HISTORY OF PRESENT ILLNESS:   Chandrika Strange is a 84 year old female who presents with hypercalcemia, primary hyperparathyroid,  osteoporosis with high FRAX  Breast ca s/p radiation, surgery and chemo.   She took aromatase inhibitors for 5 yrs -?2006    She had DXA, serum ca/PTH, and 24 hr  urine ca.   Discussed her new result and diagnosis  Labs showed PTH-mediated hypercalcemia with osteopenia with high FRAX.   24-hr Urine ca is not low     She was dx with osteopenia on DXA in 2022. I reviewed and d/w pt her result osteopenia with FRAX 19 and 10%    DXA 6/2024 osteopenia with high FRAX  20%, and for hip fracture is 11%  Has GERD   Patient is on fall precaution.  Patient is taking Vitamin D3 1000 unit/day   Taking calcium /vit    No regular tums  use.   Likes dairy but not consuming it daily  Walks and doing balance exercise   Educated the patient to do wt-bearing exercise, but avoid falls.  Last dental appt 2024     Discussed hypercalcemia symptoms and she denied but maybe polyuria and polydipsia     Fragility fracture: no   Height loss:  0.5 inch  Denied risk factors: steroid use, alcohol abuse, liver disease, kidney disease, hyperthyroid, seizure medications.   Family history of osteoporosis or fragility fracture: no but  mother had kidney  stones       ASSESSMENT AND PLAN:  84 year old female who presents with hypercalcemia, primary hyperparathyroid,  osteoporosis with high FRAX      She has multiple risk factors for osteoporosis - post MP, woman, breast ca, status AI use, high FRAX on recent DXA, recent w/u showed primary hyperparathyroid with high ca and high PTH.  24-hr Urine ca is not low   DXA 6/2024 osteopenia with high FRAX  20%, and for hip  fracture is 11%  I reviewed and discussed her w/u. She has high calcium 11.1 levels trending up slowly since 2012, eGFR 69 , albumin 4.4 and high . Vit D is low 28 but PTH and ca are high as well.     Discussed medicine vs surgery for parathyroid  Will order imaging studies and ENT consult to discuss surgery option   Will give info to read      Plan  RTC in 1 mo     We need dental clearance before we start treatment for osteoporosis /Reclast       4D CT scan for possible parathyroid adenoma   Refer to ENT to discuss surgery option     Take vitamin D  2000 international unit a day and calcium 600 mg twice a day or 3 servings of dairy a day   Do weight bearing exercise   Follow fall precautions   Discussed options oral bisphosphonate (will avoid it since you have acid reflux symptoms) vs infusion bisphosphonate vs Denosumab     educational material     https://www.[employer]/contents/medicines-for-osteoporosis-the-basics?search=osteoporosis&source=search_result&selectedTitle=2~150&usage_type=default&display_rank=2    https://www.[employer]/contents/osteoporosis-the-basics?search=osteoporosis&source=search_result&selectedTitle=1~150&usage_type=default&display_rank=1    https://www.[employer]/contents/zoledronic-acid-patient-drug-information?search=zoledronic%20acid&source=panel_search_result&selectedTitle=1~148&usage_type=panel&kp_tab=drug_patient&display_rank=1    We discussed diagnosis, treatment options, fall risk, long term complications and side effect.   Side effect from osteoporosis meds   Hypocalcemia: Adequately supplement calcium and vitamin D during  Osteonecrosis of the Jaw: Monitor for symptoms.   Atypical Femoral Fracture: Evaluate new or unusual thigh, hip, or groin      Educated the patient to do wt-bearing exercise, but avoid falls.    PAST MEDICAL HISTORY:   Past Medical History:    Breast cancer (HCC)    SURGERY, RADIATION    Coronary atherosclerosis    High blood pressure    High  cholesterol    History of perforated ear drum    Unspecified essential hypertension       PAST SURGICAL HISTORY:   Past Surgical History:   Procedure Laterality Date    Colonoscopy N/A 3/5/2018    Procedure: COLONOSCOPY;  Surgeon: Patricio Lassiter MD;  Location: [facility] ENDOSCOPY    Colonoscopy N/A 2022    Procedure: COLONOSCOPY;  Surgeon: Patricio Lassiter MD;  Location: [facility] ENDOSCOPY    Lumpectomy left            Other surgical history      ankle sx    Other surgical history      ear surgery    Other surgical history      Mastoidectomy    Radiation left         CURRENT MEDICATIONS:     Boswellia Hermila Extract Does not apply Powder       lisinopril-hydroCHLOROthiazide 20-25 MG Oral Tab Take 1 tablet by mouth daily. 90 tablet 3    rosuvastatin 20 MG Oral Tab Take 1 tablet (20 mg total) by mouth nightly.  0    Zinc Sulfate (ZINC 15 OR) Take by mouth.      aspirin 81 MG Oral Tab EC Take 1 tablet (81 mg total) by mouth daily. 30 tablet 1    Cyanocobalamin (VITAMIN B-12 OR) Take by mouth.      Boswellia Hermila (BOSWELLIA OR) Take by mouth.      Coenzyme Q10 (COQ10 OR) Take  by mouth.      omega-3 fatty acids (FISH OIL) 1000 MG Oral Cap Take 1,000 mg by mouth daily.         ALLERGIES:  No Known Allergies    SOCIAL HISTORY:    Social History     Socioeconomic History    Marital status:    Tobacco Use    Smoking status: Never    Smokeless tobacco: Never   Vaping Use    Vaping status: Never Used   Substance and Sexual Activity    Alcohol use: Yes     Alcohol/week: 0.0 standard drinks of alcohol     Comment: 1-3 glasses of wine/week    Drug use: No    Sexual activity: Not Currently     Partners: Male   Other Topics Concern    Caffeine Concern Yes     Comment: Coffee    Reaction to local anesthetic No       FAMILY HISTORY:   Family History   Problem Relation Age of Onset    Cancer Father         Cancer-brain    Other (Other) Mother     Cancer Sister         Cancer-colon    Breast Cancer Self 66         lt breast           PHYSICAL EXAM:   Height: 5' 4\" (162.6 cm) (07/05 1004)  Weight: 211 lb 9.6 oz (96 kg) (07/05 1004)  BSA (Calculated - sq m): 2 sq meters (07/05 1004)  Pulse: 68 (07/05 1004)  BP: 112/72 (07/05 1004)  Temp: --  Do Not Use - Resp Rate: --  SpO2: --    Body mass index is 36.32 kg/m².       CONSTITUTIONAL:  Awake and alert. Age appropriate, good hygiene not in acute distress. Well-nourished and well developed. no acute distress   PSYCH:   Orientated to time, place, person & situation, Normal mood and affect, memory intact, normal insight and judgment, cooperative       DATA:     Pertinent data reviewed   06/28/24 08:24   XR DEXA BONE DENSITOMETRY (CPT=77080) left femoral neck suggest osteopenia, there may be increased fracture risk.  There has been decrease in the BMD by 1.2% from previous study.  Findings in the total left hip suggest osteopenia, there may be increased fracture risk.    There has been decrease in the BMD by 2.2% from previous study.  The 10 year fracture risk for major osteoporotic fracture is 20%, and for hip fracture is 11%.   2. Findings in the total AP lumbar spine are in the normal range, and there is no increased fracture risk.  There has been decrease in the BMD by 1.5% from previous study.       Latest Reference Range & Units 04/04/24 07:01   CREATININE 0.55 - 1.02 mg/dL 0.84   CALCIUM 8.7 - 10.4 mg/dL 11.2 (H)   BUN/CREATININE RATIO 10.0 - 20.0  20.2 (H)   EGFR >=60 mL/min/1.73m2 69   (H): Data is abnormally high   Latest Reference Range & Units 04/04/24 07:01   PTH INTACT 18.5 - 88.0 pg/mL 139.9 (H)   (H): Data is abnormally high   Latest Reference Range & Units 03/06/24 06:25   VITAMIN D, 25-OH, TOTAL 30.0 - 100.0 ng/mL 28.5 (L)   (L): Data is abnormally low   Latest Reference Range & Units 04/03/24 07:35   CREAT UR CALC 0.60 - 1.80 g/24 hr 0.99   Urine Volume 24Hr mL  mL 2,050  2,050   CALCIUM URINE CALC 100 - 300 mg/24hr 185            Latest Reference Range &  Units 02/28/23 07:31 03/06/24 06:25   CALCIUM 8.7 - 10.4 mg/dL 10.4 (H) 11.1 (H)   BUN/CREATININE RATIO 10.0 - 20.0  20.5 (H) 20.2 (H)   EGFR >=60 mL/min/1.73m2 66 69      Latest Reference Range & Units 02/28/23 07:31 03/06/24 06:25   ALKALINE PHOSPHATASE 55 - 142 U/L 71 66      Latest Reference Range & Units 02/28/23 07:31 03/06/24 06:25   Albumin 3.2 - 4.8 g/dL 3.6 4.4   VITAMIN D, 25-OH, TOTAL 30.0 - 100.0 ng/mL  28.5 (L)      Latest Reference Range & Units 02/28/23 07:31 03/06/24 06:25 03/18/24 13:02   T4,Free (Direct) 0.8 - 1.7 ng/dL 0.8 0.9    TSH 0.550 - 4.780 mIU/mL 4.280 (H) 5.333 (H)    PTH INTACT 18.5 - 88.0 pg/mL   158.4 (H)         No results for input(s): \"TSH\", \"T4F\", \"T3F\", \"THYP\" in the last 72 hours.  No results found.    No orders of the defined types were placed in this encounter.    No orders of the defined types were placed in this encounter.         This is a specialized patient consultation in endocrinology and required comprehensive review of prior records, as well as current evaluation, with time required for consideration of complex endocrine issues and consultation. For this visit, I personally interviewed the patient, and family member if accompanied, performed the pertinent parts of the history and physical examination. ROS included screening for appropriate endocrine conditions.   Today's diagnosis and plan were reviewed in detail with the patient who states understanding and agrees with plan. I discussed with the patient possible diagnosis, differential diagnosis, need for work up, treatment options, alternatives and side effects.     Please see note for details about time spent which includes:   · pre-visit preparation  · reviewing records  · face to face time with the patient   · timely documentation of the encounter  · ordering medications/tests  · communication with care team  · care coordination    I appreciate the opportunity to be part of your patient's medical care and will  keep you, as the referring and primary physicians, informed about the care of your patient. Please feel free to contact me should you have any questions.      Claudio Beth MD

## 2024-07-10 ENCOUNTER — OFFICE VISIT (OUTPATIENT)
Dept: FAMILY MEDICINE CLINIC | Facility: CLINIC | Age: 84
End: 2024-07-10

## 2024-07-10 VITALS
SYSTOLIC BLOOD PRESSURE: 150 MMHG | HEIGHT: 64 IN | OXYGEN SATURATION: 97 % | BODY MASS INDEX: 36.02 KG/M2 | DIASTOLIC BLOOD PRESSURE: 76 MMHG | TEMPERATURE: 98 F | WEIGHT: 211 LBS | HEART RATE: 72 BPM

## 2024-07-10 DIAGNOSIS — I10 PRIMARY HYPERTENSION: ICD-10-CM

## 2024-07-10 DIAGNOSIS — E78.2 MIXED HYPERLIPIDEMIA: ICD-10-CM

## 2024-07-10 DIAGNOSIS — I25.10 CORONARY ARTERY DISEASE INVOLVING NATIVE CORONARY ARTERY OF NATIVE HEART WITHOUT ANGINA PECTORIS: Primary | ICD-10-CM

## 2024-07-10 DIAGNOSIS — E66.1 CLASS 2 DRUG-INDUCED OBESITY WITH SERIOUS COMORBIDITY AND BODY MASS INDEX (BMI) OF 36.0 TO 36.9 IN ADULT: ICD-10-CM

## 2024-07-10 PROCEDURE — G2211 COMPLEX E/M VISIT ADD ON: HCPCS | Performed by: STUDENT IN AN ORGANIZED HEALTH CARE EDUCATION/TRAINING PROGRAM

## 2024-07-10 PROCEDURE — 99214 OFFICE O/P EST MOD 30 MIN: CPT | Performed by: STUDENT IN AN ORGANIZED HEALTH CARE EDUCATION/TRAINING PROGRAM

## 2024-07-10 NOTE — PROGRESS NOTES
HPI:    Patient ID: Chandrika Strange is a 84 year old female.    HPI  Pt presenting for follow-up.    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  Pertinent positives and negatives noted in the the HPI.       Current Outpatient Medications   Medication Sig Dispense Refill    Calcium Carbonate Antacid 1000 MG Oral Chew Tab Chew 1,000 mg by mouth 3 (three) times daily.      semaglutide-weight management 0.25 MG/0.5ML Subcutaneous Solution Auto-injector Inject 0.5 mL (0.25 mg total) into the skin once a week for 4 doses. 2 mL 0    Meloxicam 15 MG Oral Tab Take 1 tablet (15 mg total) by mouth daily. 30 tablet 0    Boswellia Hermila Extract Does not apply Powder       lisinopril-hydroCHLOROthiazide 20-25 MG Oral Tab Take 1 tablet by mouth daily. 90 tablet 3    Zinc Sulfate (ZINC 15 OR) Take by mouth.      aspirin 81 MG Oral Tab EC Take 1 tablet (81 mg total) by mouth daily. 30 tablet 1    Cyanocobalamin (VITAMIN B-12 OR) Take by mouth.      Boswellia Hermila (BOSWELLIA OR) Take by mouth.      Coenzyme Q10 (COQ10 OR) Take  by mouth.      omega-3 fatty acids (FISH OIL) 1000 MG Oral Cap Take 1,000 mg by mouth daily.      rosuvastatin 20 MG Oral Tab Take 1 tablet (20 mg total) by mouth nightly. 90 tablet 3     Allergies:No Known Allergies   Vitals:    07/10/24 1123 07/10/24 1206   BP: 147/84 150/76   Pulse: 74 72   Temp: 97.6 °F (36.4 °C)    TempSrc: Temporal    SpO2: 97% 97%   Weight: 211 lb (95.7 kg)    Height: 5' 4\" (1.626 m)        Body mass index is 36.22 kg/m².   PHYSICAL EXAM:   Physical Exam  Vitals reviewed.   Constitutional:       General: She is not in acute distress.  HENT:      Head: Normocephalic.   Eyes:       Conjunctiva/sclera: Conjunctivae normal.   Cardiovascular:      Rate and Rhythm: Normal rate and regular rhythm.      Pulses: Normal pulses.   Pulmonary:      Effort: Pulmonary effort is normal. No respiratory distress.      Breath sounds: Normal breath sounds.   Abdominal:      General: Bowel sounds are normal.      Palpations: Abdomen is soft.   Musculoskeletal:      Cervical back: Normal range of motion. No tenderness.      Right lower leg: No edema.      Left lower leg: No edema.   Lymphadenopathy:      Cervical: No cervical adenopathy.   Neurological:      General: No focal deficit present.      Mental Status: She is alert and oriented to person, place, and time.   Psychiatric:         Mood and Affect: Mood normal.         Behavior: Behavior normal.             ASSESSMENT/PLAN:   1. Coronary artery disease involving native coronary artery of native heart without angina pectoris  Discussed benefit of Wegovy in setting of prior CAD to reduce future cardiac risk  - will trial Wegovy dosing -- discussed dose titration, side effects, etc  - continue statin, ASA dosing  - discussed red flags for urgent reevaluation  - semaglutide-weight management 0.25 MG/0.5ML Subcutaneous Solution Auto-injector; Inject 0.5 mL (0.25 mg total) into the skin once a week for 4 doses.  Dispense: 2 mL; Refill: 0    2. Mixed hyperlipidemia  As above  - semaglutide-weight management 0.25 MG/0.5ML Subcutaneous Solution Auto-injector; Inject 0.5 mL (0.25 mg total) into the skin once a week for 4 doses.  Dispense: 2 mL; Refill: 0    3. Primary hypertension  In-office BP elevated, pt otherwise asymptomatic  Home BP readings wnl  - discussed benefits of DASH diet and daily activity  - continue BP monitoring  - continue daily medication  - semaglutide-weight management 0.25 MG/0.5ML Subcutaneous Solution Auto-injector; Inject 0.5 mL (0.25 mg total) into the skin once a week for 4 doses.  Dispense: 2 mL; Refill: 0    4. Class 2 drug-induced  obesity with serious comorbidity and body mass index (BMI) of 36.0 to 36.9 in adult  - discussed healthy diet and lifestyle changes for overall wellness, which includes decreased carb and sugar intake, increased fiber intake, and increased water intake as tolerated, as well as regular exercise  - counseled on increased protein intake, goal 20-30g/meal  - goal moderate-intensity activity 30min daily / 150min per week    Follow-up in 2 months for surveillance. Pt verbalized understanding and agrees with plan.    No orders of the defined types were placed in this encounter.      Meds This Visit:  Requested Prescriptions     Signed Prescriptions Disp Refills    semaglutide-weight management 0.25 MG/0.5ML Subcutaneous Solution Auto-injector 2 mL 0     Sig: Inject 0.5 mL (0.25 mg total) into the skin once a week for 4 doses.       Imaging & Referrals:  None         ID#[id number]

## 2024-07-16 ENCOUNTER — TELEPHONE (OUTPATIENT)
Dept: ENDOCRINOLOGY CLINIC | Facility: CLINIC | Age: 84
End: 2024-07-16

## 2024-07-16 ENCOUNTER — HOSPITAL ENCOUNTER (OUTPATIENT)
Dept: CT IMAGING | Facility: HOSPITAL | Age: 84
Discharge: HOME | End: 2024-07-16
Attending: INTERNAL MEDICINE
Payer: MEDICARE

## 2024-07-16 DIAGNOSIS — E21.3 HYPERPARATHYROIDISM (HCC): ICD-10-CM

## 2024-07-16 DIAGNOSIS — E04.1 THYROID NODULE: ICD-10-CM

## 2024-07-16 DIAGNOSIS — E21.3 HYPERPARATHYROIDISM (HCC): Primary | ICD-10-CM

## 2024-07-16 LAB
CREAT BLD-MCNC: 1.3 MG/DL
EGFRCR SERPLBLD CKD-EPI 2021: 41 ML/MIN/1.73M2 (ref 60–?)

## 2024-07-16 PROCEDURE — 70492 CT SFT TSUE NCK W/O & W/DYE: CPT | Performed by: INTERNAL MEDICINE

## 2024-07-16 PROCEDURE — 82565 ASSAY OF CREATININE: CPT

## 2024-07-17 RX ORDER — ROSUVASTATIN CALCIUM 20 MG/1
20 TABLET, COATED ORAL NIGHTLY
Qty: 90 TABLET | Refills: 3 | Status: SHIPPED | OUTPATIENT
Start: 2024-07-17

## 2024-07-17 NOTE — TELEPHONE ENCOUNTER
Please call the Pt with result   CT showed Thyroid nodule, will order US   Will discuss more next visit   Thanks

## 2024-07-17 NOTE — TELEPHONE ENCOUNTER
Spoke to patient and relayed message as shown below. Patient verbalized understanding and had no further questions at this time.

## 2024-07-17 NOTE — TELEPHONE ENCOUNTER
Refill passed per [facility] protocol.  Requested Prescriptions   Pending Prescriptions Disp Refills    rosuvastatin 20 MG Oral Tab 90 tablet 3     Sig: Take 1 tablet (20 mg total) by mouth nightly.       Cholesterol Medication Protocol Passed - 7/17/2024  8:40 AM        Passed - ALT < 80     Lab Results   Component Value Date    ALT 20 04/04/2024             Passed - ALT resulted within past year        Passed - Lipid panel within past 12 months     Lab Results   Component Value Date    CHOLEST 136 03/06/2024    TRIG 118 03/06/2024    HDL 50 03/06/2024    LDL 65 03/06/2024    VLDL 18 03/06/2024    NONHDLC 86 03/06/2024             Passed - In person appointment or virtual visit in the past 12 mos or appointment in next 3 mos     Recent Outpatient Visits              1 week ago Coronary artery disease involving native coronary artery of native heart without angina pectoris    [facility]    [facility]    1 week ago Hyperparathyroidism (HCC)    [facility] Claudio Beth MD    Office Visit    3 months ago Hyperparathyroidism (HCC)    [facility] Claudio Beth MD    Office Visit    3 months ago Encounter for annual health examination    [facility] AlirioCaron MD    Office Visit    4 months ago Bilateral hand pain    [facility] Lexii Delatorre MD    Office Visit          Future Appointments         Provider Department Appt Notes    In 6 days [address] for [facility]     In 1 month Claudio Beth MD [facility] 5 week                       Future Appointments         Provider Department Appt Notes    In 6 days [address] for  Health     In 1 month Claudio Beth MD [facility], [facility] [place] 5 week          Recent Outpatient Visits              1 week ago Coronary artery disease involving native coronary artery of native heart without angina pectoris    [facility], [facility], Caron Isabel MD    Office Visit    1 week ago Hyperparathyroidism (HCC)    [facility], [facility], [place] Claudio Beth MD    Office Visit    3 months ago Hyperparathyroidism (HCC)    [facility], [facility] Claudio Beth MD    Office Visit    3 months ago Encounter for annual health examination    [facility], [facility], Caron Isabel MD    Office Visit    4 months ago Bilateral hand pain    [facility] Del[facility]    Office Visit

## 2024-07-17 NOTE — TELEPHONE ENCOUNTER
Patient is requesting refill for rosuvastatin 20 MG Oral Tab. Pharmacy stated they need prior authorization. Patient is running [facility] - [facility] - [facility] AT [facility], [phone], [phone]

## 2024-07-19 ENCOUNTER — TELEPHONE (OUTPATIENT)
Dept: FAMILY MEDICINE CLINIC | Facility: CLINIC | Age: 84
End: 2024-07-19

## 2024-07-19 NOTE — TELEPHONE ENCOUNTER
Patient called and said she saw  last week and was going to prescribe wegovy but pharmacy is waiting for authorization. Patient said pharmacy called yesterday. Please notify with any updates.

## 2024-07-22 NOTE — TELEPHONE ENCOUNTER
Approved    Prior authorization approved  Payer: [facility] Case ID: [id number]    [phone]    [phone]  Approval Details    Authorized from January 1, 2024 to December 31, 2024  Electronic appeal: Not supported  View History

## 2024-07-23 ENCOUNTER — HOSPITAL ENCOUNTER (OUTPATIENT)
Dept: MAMMOGRAPHY | Facility: HOSPITAL | Age: 84
Discharge: HOME | End: 2024-07-23
Attending: STUDENT IN AN ORGANIZED HEALTH CARE EDUCATION/TRAINING PROGRAM
Payer: MEDICARE

## 2024-07-23 DIAGNOSIS — Z12.31 ENCOUNTER FOR SCREENING MAMMOGRAM FOR MALIGNANT NEOPLASM OF BREAST: ICD-10-CM

## 2024-07-23 PROCEDURE — 77067 SCR MAMMO BI INCL CAD: CPT | Performed by: STUDENT IN AN ORGANIZED HEALTH CARE EDUCATION/TRAINING PROGRAM

## 2024-07-23 PROCEDURE — 77063 BREAST TOMOSYNTHESIS BI: CPT | Performed by: STUDENT IN AN ORGANIZED HEALTH CARE EDUCATION/TRAINING PROGRAM

## 2024-08-01 ENCOUNTER — TELEPHONE (OUTPATIENT)
Dept: ENDOCRINOLOGY CLINIC | Facility: CLINIC | Age: 84
End: 2024-08-01

## 2024-08-01 NOTE — TELEPHONE ENCOUNTER
Patient states that her PCP is recommending that she start taking Wegovy for weight loss.  PCP informed her that this medication is safe for Heart patients.  Patient wanted to know if this was ok to start.  Please call

## 2024-08-01 NOTE — TELEPHONE ENCOUNTER
Dr. Beth -- spoke to pt. Pt is wondering if wegovy is safe to take with hyperparathyroidism? Pt has US thyroid scheduled on 8/15. States she read online about contraindications and wants to confirm with you.     Per chart review, TE on 7/19 with PCP Caron Amezquita. Wegovy was approved.

## 2024-08-02 NOTE — TELEPHONE ENCOUNTER
Ok to take wegovy  with parathyroid   Dx of Medullary thyroid cancer is a contraindication , so patients with this diagnosis should not take wegovy  Will discuss more next visit   Thanks

## 2024-08-15 ENCOUNTER — HOSPITAL ENCOUNTER (OUTPATIENT)
Dept: ULTRASOUND IMAGING | Facility: HOSPITAL | Age: 84
Discharge: HOME | End: 2024-08-15
Attending: INTERNAL MEDICINE
Payer: MEDICARE

## 2024-08-15 DIAGNOSIS — E21.3 HYPERPARATHYROIDISM (HCC): ICD-10-CM

## 2024-08-15 DIAGNOSIS — E04.1 THYROID NODULE: ICD-10-CM

## 2024-08-15 PROCEDURE — 76536 US EXAM OF HEAD AND NECK: CPT | Performed by: INTERNAL MEDICINE

## 2024-08-26 ENCOUNTER — TELEPHONE (OUTPATIENT)
Dept: ENDOCRINOLOGY CLINIC | Facility: CLINIC | Age: 84
End: 2024-08-26

## 2024-08-26 ENCOUNTER — OFFICE VISIT (OUTPATIENT)
Dept: ENDOCRINOLOGY CLINIC | Facility: CLINIC | Age: 84
End: 2024-08-26

## 2024-08-26 VITALS
DIASTOLIC BLOOD PRESSURE: 75 MMHG | WEIGHT: 212 LBS | HEIGHT: 64 IN | BODY MASS INDEX: 36.19 KG/M2 | HEART RATE: 74 BPM | SYSTOLIC BLOOD PRESSURE: 158 MMHG

## 2024-08-26 DIAGNOSIS — E78.2 MIXED HYPERLIPIDEMIA: ICD-10-CM

## 2024-08-26 DIAGNOSIS — C50.919 MALIGNANT NEOPLASM OF FEMALE BREAST, UNSPECIFIED ESTROGEN RECEPTOR STATUS, UNSPECIFIED LATERALITY, UNSPECIFIED SITE OF BREAST (HCC): ICD-10-CM

## 2024-08-26 DIAGNOSIS — E21.3 HYPERPARATHYROIDISM (HCC): Primary | ICD-10-CM

## 2024-08-26 DIAGNOSIS — E04.1 THYROID NODULE: ICD-10-CM

## 2024-08-26 DIAGNOSIS — E83.52 HYPERCALCEMIA: ICD-10-CM

## 2024-08-26 DIAGNOSIS — E03.8 SUBCLINICAL HYPOTHYROIDISM: ICD-10-CM

## 2024-08-26 DIAGNOSIS — E66.9 CLASS 2 OBESITY WITH BODY MASS INDEX (BMI) OF 36.0 TO 36.9 IN ADULT, UNSPECIFIED OBESITY TYPE, UNSPECIFIED WHETHER SERIOUS COMORBIDITY PRESENT: ICD-10-CM

## 2024-08-26 DIAGNOSIS — I65.23 CAROTID ATHEROSCLEROSIS, BILATERAL: ICD-10-CM

## 2024-08-26 DIAGNOSIS — I25.10 CORONARY ARTERY DISEASE INVOLVING NATIVE CORONARY ARTERY OF NATIVE HEART WITHOUT ANGINA PECTORIS: ICD-10-CM

## 2024-08-26 DIAGNOSIS — M81.0 AGE-RELATED OSTEOPOROSIS WITHOUT CURRENT PATHOLOGICAL FRACTURE: ICD-10-CM

## 2024-08-26 DIAGNOSIS — I10 PRIMARY HYPERTENSION: ICD-10-CM

## 2024-08-26 DIAGNOSIS — R79.89 LOW VITAMIN D LEVEL: ICD-10-CM

## 2024-08-26 NOTE — PROGRESS NOTES
Reason for Visit:  hypercalcemia, primary hyperparathyroid,  osteoporosis with high FRAX, thyroid nodule  Requesting Physician:   ..Caron Amezquita MD    CHIEF COMPLAINT:    Chief Complaint   Patient presents with    Follow - Up     Pt is in for a Hyperparathyroidism follow up.      HISTORY OF PRESENT ILLNESS:   Chandrika Strange is a 84 year old female who presents with hypercalcemia, primary hyperparathyroid,  osteoporosis with high FRAX, thyroid nodule  Breast ca s/p radiation, surgery and chemo.   She took aromatase inhibitors for 5 yrs -?2006  After last visit, CT showed Thyroid nodule, we ordered US thyroid.   We discussed findings with the pt.   She was overwhelmed with the result.     7/16/2024 4D CT showed 2.6 cm Rt thyroid nodule and  possible thyroid adenoma   8/15/2024 US showed MNG Rt thyroid nodule meets criteria for FNA         She had DXA, serum ca/PTH, and 24 hr  urine ca.   Discussed her new result and diagnosis  Labs showed PTH-mediated hypercalcemia with osteopenia with high FRAX.   24-hr Urine ca is not low     She was dx with osteopenia on DXA in 2022. I reviewed and d/w pt her result osteopenia with FRAX 19 and 10%    DXA 6/2024 osteopenia with high FRAX  20%, and for hip fracture is 11%  Has GERD   Patient is on fall precaution.  Patient is taking Vitamin D3 1000 unit/day   Taking calcium /vit    No regular tums  use.   Likes dairy but not consuming it daily  Walks and doing balance exercise   Educated the patient to do wt-bearing exercise, but avoid falls.  Last dental appt 2024     Discussed hypercalcemia symptoms and she denied but maybe polyuria and polydipsia     Fragility fracture: no   Height loss:  0.5 inch  Denied risk factors: steroid use, alcohol abuse, liver disease, kidney disease, hyperthyroid, seizure medications.   Family history of osteoporosis or fragility fracture: no but  mother had kidney  stones       ASSESSMENT AND PLAN:  84 year old female who presents with  hypercalcemia, primary hyperparathyroid,  osteoporosis with high FRAX and thyroid nodule    She has multiple risk factors for osteoporosis - post MP, woman, breast ca, status AI use, high FRAX on recent DXA, recent w/u showed primary hyperparathyroid with high ca and high PTH.  24-hr Urine ca is not low   DXA 6/2024 osteopenia with high FRAX  20%, and for hip fracture is 11%  I reviewed and discussed her w/u. She has high calcium 11.1 levels trending up slowly since 2012, eGFR 69 , albumin 4.4 and high . Vit D is low 28 but PTH and ca are high as well.     Discussed medicine vs surgery for parathyroid and she was overwhelmed so will discuss problems again in separate visits. Will get US guided FNA now first then will get to work on parathyroid.       Plan  Will refer to get FNA to Rt thyroid nodule.   RTC in 1 mo     We need dental clearance before we start treatment for osteoporosis /Reclast       4D CT scan for possible parathyroid adenoma   Refer to ENT to discuss surgery option     Take vitamin D  2000 international unit a day and calcium 600 mg twice a day or 3 servings of dairy a day   Do weight bearing exercise   Follow fall precautions   Discussed options oral bisphosphonate (will avoid it since you have acid reflux symptoms) vs infusion bisphosphonate vs Denosumab     educational material     https://[email or web address]/contents/medicines-for-osteoporosis-the-basics?search=osteoporosis&source=search_result&selectedTitle=2~150&usage_type=default&display_rank=2    https://[email or web address]/contents/osteoporosis-the-basics?search=osteoporosis&source=search_result&selectedTitle=1~150&usage_type=default&display_rank=1    https://www.[employer]/contents/zoledronic-acid-patient-drug-information?search=zoledronic%20acid&source=panel_search_result&selectedTitle=1~148&usage_type=panel&kp_tab=drug_patient&display_rank=1    We discussed diagnosis, treatment options, fall risk, long term complications and side  effect.   Side effect from osteoporosis meds   Hypocalcemia: Adequately supplement calcium and vitamin D during  Osteonecrosis of the Jaw: Monitor for symptoms.   Atypical Femoral Fracture: Evaluate new or unusual thigh, hip, or groin      Educated the patient to do wt-bearing exercise, but avoid falls.    PAST MEDICAL HISTORY:   Past Medical History:    Breast cancer (HCC)    SURGERY, RADIATION    Coronary atherosclerosis    High blood pressure    High cholesterol    History of perforated ear drum    Unspecified essential hypertension       PAST SURGICAL HISTORY:   Past Surgical History:   Procedure Laterality Date    Colonoscopy N/A 3/5/2018    Procedure: COLONOSCOPY;  Surgeon: Patricio Lassiter MD;  Location: [facility] ENDOSCOPY    Colonoscopy N/A 2022    Procedure: COLONOSCOPY;  Surgeon: Patricio Lassiter MD;  Location: [facility] ENDOSCOPY    Lumpectomy left            Other surgical history      ankle sx    Other surgical history      ear surgery    Other surgical history      Mastoidectomy    Radiation left         CURRENT MEDICATIONS:    No outpatient medications have been marked as taking for the 24 encounter (Office Visit) with Claudio Beth MD.       ALLERGIES:  No Known Allergies    SOCIAL HISTORY:    Social History     Socioeconomic History    Marital status:    Tobacco Use    Smoking status: Never    Smokeless tobacco: Never   Vaping Use    Vaping status: Never Used   Substance and Sexual Activity    Alcohol use: Yes     Alcohol/week: 0.0 standard drinks of alcohol     Comment: 1-3 glasses of wine/week    Drug use: No    Sexual activity: Not Currently     Partners: Male   Other Topics Concern    Caffeine Concern Yes     Comment: Coffee    Reaction to local anesthetic No       FAMILY HISTORY:   Family History   Problem Relation Age of Onset    Breast Cancer Self 66        lt breast    Other (Other) Mother     Cancer Father         Cancer-brain    Cancer Sister          Cancer-colon    Breast Cancer Daughter 55           PHYSICAL EXAM:   Height: 5' 4\" (162.6 cm) (08/26 1004)  Weight: 212 lb (96.2 kg) (08/26 1004)  BSA (Calculated - sq m): 2.01 sq meters (08/26 1004)  Pulse: 74 (08/26 1058)  BP: 158/75 (08/26 1058)  Temp: --  Do Not Use - Resp Rate: --  SpO2: --    Body mass index is 36.39 kg/m².       CONSTITUTIONAL:  Awake and alert. Age appropriate, good hygiene not in acute distress. Well-nourished and well developed. no acute distress   PSYCH:   Orientated to time, place, person & situation, Normal mood and affect, memory intact, normal insight and judgment, cooperative       DATA:     Pertinent data reviewed   08/15/24 11:16   US THYROID (CPT=76536) Rpt   Rpt: View report in Results Review for more information   07/16/24 13:54   CT SOFT TISSUE OF NECK 4D (W+WO) (CPT=70492) Rpt   Rpt: View report in Results Review for more information  4/3/24  7:35 AM Resulting Agency    Calcium Urine Calc  100 - 300 mg/24hr [facility] ([facility])   Urine Volume 24Hr  mL 2,050       06/28/24 08:24   XR DEXA BONE DENSITOMETRY (CPT=77080) left femoral neck suggest osteopenia, there may be increased fracture risk.  There has been decrease in the BMD by 1.2% from previous study.  Findings in the total left hip suggest osteopenia, there may be increased fracture risk.    There has been decrease in the BMD by 2.2% from previous study.  The 10 year fracture risk for major osteoporotic fracture is 20%, and for hip fracture is 11%.   2. Findings in the total AP lumbar spine are in the normal range, and there is no increased fracture risk.  There has been decrease in the BMD by 1.5% from previous study.       Latest Reference Range & Units 04/04/24 07:01   CREATININE 0.55 - 1.02 mg/dL 0.84   CALCIUM 8.7 - 10.4 mg/dL 11.2 (H)   BUN/CREATININE RATIO 10.0 - 20.0  20.2 (H)   EGFR >=60 mL/min/1.73m2 69   (H): Data is abnormally high   Latest Reference Range & Units 04/04/24 07:01   PTH INTACT 18.5 - 88.0  pg/mL 139.9 (H)   (H): Data is abnormally high   Latest Reference Range & Units 03/06/24 06:25   VITAMIN D, 25-OH, TOTAL 30.0 - 100.0 ng/mL 28.5 (L)   (L): Data is abnormally low   Latest Reference Range & Units 04/03/24 07:35   CREAT UR CALC 0.60 - 1.80 g/24 hr 0.99   Urine Volume 24Hr mL  mL 2,050  2,050   CALCIUM URINE CALC 100 - 300 mg/24hr 185            Latest Reference Range & Units 02/28/23 07:31 03/06/24 06:25   CALCIUM 8.7 - 10.4 mg/dL 10.4 (H) 11.1 (H)   BUN/CREATININE RATIO 10.0 - 20.0  20.5 (H) 20.2 (H)   EGFR >=60 mL/min/1.73m2 66 69      Latest Reference Range & Units 02/28/23 07:31 03/06/24 06:25   ALKALINE PHOSPHATASE 55 - 142 U/L 71 66      Latest Reference Range & Units 02/28/23 07:31 03/06/24 06:25   Albumin 3.2 - 4.8 g/dL 3.6 4.4   VITAMIN D, 25-OH, TOTAL 30.0 - 100.0 ng/mL  28.5 (L)      Latest Reference Range & Units 02/28/23 07:31 03/06/24 06:25 03/18/24 13:02   T4,Free (Direct) 0.8 - 1.7 ng/dL 0.8 0.9    TSH 0.550 - 4.780 mIU/mL 4.280 (H) 5.333 (H)    PTH INTACT 18.5 - 88.0 pg/mL   158.4 (H)         No results for input(s): \"TSH\", \"T4F\", \"T3F\", \"THYP\" in the last 72 hours.  No results found.    No orders of the defined types were placed in this encounter.    No orders of the defined types were placed in this encounter.         This is a specialized patient consultation in endocrinology and required comprehensive review of prior records, as well as current evaluation, with time required for consideration of complex endocrine issues and consultation. For this visit, I personally interviewed the patient, and family member if accompanied, performed the pertinent parts of the history and physical examination. ROS included screening for appropriate endocrine conditions.   Today's diagnosis and plan were reviewed in detail with the patient who states understanding and agrees with plan. I discussed with the patient possible diagnosis, differential diagnosis, need for work up, treatment options,  alternatives and side effects.     Please see note for details about time spent which includes:   · pre-visit preparation  · reviewing records  · face to face time with the patient   · timely documentation of the encounter  · ordering medications/tests  · communication with care team  · care coordination    I appreciate the opportunity to be part of your patient's medical care and will keep you, as the referring and primary physicians, informed about the care of your patient. Please feel free to contact me should you have any questions.    Total 40 minutes     Claudio Beth MD

## 2024-08-26 NOTE — PATIENT INSTRUCTIONS
Will refer to get FNA to Rt thyroid nodule.   RTC in 1 mo     We need dental clearance before we start treatment for osteoporosis /Reclast       Take vitamin D  2000 international unit a day and calcium 600 mg twice a day or 3 servings of dairy a day   Do weight bearing exercise   Follow fall precautions   Discussed options oral bisphosphonate (will avoid it since you have acid reflux symptoms) vs infusion bisphosphonate vs Denosumab     educational material     https://www.[employer]/contents/medicines-for-osteoporosis-the-basics?search=osteoporosis&source=search_result&selectedTitle=2~150&usage_type=default&display_rank=2    https://www.[employer]/contents/osteoporosis-the-basics?search=osteoporosis&source=search_result&selectedTitle=1~150&usage_type=default&display_rank=1    https://www.[employer]/contents/zoledronic-acid-patient-drug-information?search=zoledronic%20acid&source=panel_search_result&selectedTitle=1~148&usage_type=panel&kp_tab=drug_patient&display_rank=1    We discussed diagnosis, treatment options, fall risk, long term complications and side effect.   Side effect from osteoporosis meds   Hypocalcemia: Adequately supplement calcium and vitamin D during  Osteonecrosis of the Jaw: Monitor for symptoms.   Atypical Femoral Fracture: Evaluate new or unusual thigh, hip, or groin      Educated the patient to do wt-bearing exercise, but avoid falls.

## 2024-08-28 NOTE — TELEPHONE ENCOUNTER
Patient calling to notify us that she wont be able to schedule thyroid biopsy before her trip to [place].     Patient scheduled fu after biopsy on 11/29

## 2024-09-09 ENCOUNTER — TELEPHONE (OUTPATIENT)
Dept: FAMILY MEDICINE CLINIC | Facility: CLINIC | Age: 84
End: 2024-09-09

## 2024-09-09 NOTE — TELEPHONE ENCOUNTER
Patient contacted and notified.  She continues to question elevated TSH.  Transferred patient to endocrinology to discuss further. Message routed to endocrinology clinical staff.

## 2024-09-09 NOTE — TELEPHONE ENCOUNTER
Per last Endo note 8/26, subclinical hypothyroidism does not require treatment. However she is encouraged to discuss further with Endo if she would be candidate for supplementation in setting of recent labs and symptoms.

## 2024-09-09 NOTE — TELEPHONE ENCOUNTER
Patient states she was looking through her lab results from March, and feels she did not discuss the elevated TSH level, she did some research and is asking if she needs to treat this.    She sees an endocrinologist for her parathyroid.    Patient states she has a hard time losing weight, fatigue and dry skin.

## 2024-09-11 NOTE — TELEPHONE ENCOUNTER
Dr. Beth,     Pt requesting further follow up for elevated TSH in March.   Patient states she has a hard time losing weight, fatigue and dry skin. Pt asking if she requires treatment.     Component      Latest Ref Rng 3/6/2024   TSH      0.550 - 4.780 mIU/mL 5.333 (H)

## 2024-09-12 NOTE — TELEPHONE ENCOUNTER
CASSANDRA Sahni    Called pt and provided the below message. Pt reports a lot of confusion with her diagnosis and would like to repeat all labs at the next visit. Pt states that she has appt 11/29 but may need to reschedule appt due to being out of state and may not return until January.

## 2024-09-12 NOTE — TELEPHONE ENCOUNTER
TSH 5.3 is unlikely the reason for difficulty in losing wt.  Schedule an appt please   Will discuss more next visit   Thanks

## 2024-11-06 ENCOUNTER — PATIENT MESSAGE (OUTPATIENT)
Dept: FAMILY MEDICINE CLINIC | Facility: CLINIC | Age: 84
End: 2024-11-06

## 2024-11-29 ENCOUNTER — OFFICE VISIT (OUTPATIENT)
Facility: CLINIC | Age: 84
End: 2024-11-29

## 2024-11-29 VITALS
DIASTOLIC BLOOD PRESSURE: 80 MMHG | BODY MASS INDEX: 35 KG/M2 | HEART RATE: 80 BPM | WEIGHT: 205 LBS | HEIGHT: 64 IN | SYSTOLIC BLOOD PRESSURE: 124 MMHG

## 2024-11-29 DIAGNOSIS — E78.2 MIXED HYPERLIPIDEMIA: ICD-10-CM

## 2024-11-29 DIAGNOSIS — C50.919 MALIGNANT NEOPLASM OF FEMALE BREAST, UNSPECIFIED ESTROGEN RECEPTOR STATUS, UNSPECIFIED LATERALITY, UNSPECIFIED SITE OF BREAST (HCC): ICD-10-CM

## 2024-11-29 DIAGNOSIS — E03.8 SUBCLINICAL HYPOTHYROIDISM: ICD-10-CM

## 2024-11-29 DIAGNOSIS — E83.52 HYPERCALCEMIA: ICD-10-CM

## 2024-11-29 DIAGNOSIS — I10 PRIMARY HYPERTENSION: ICD-10-CM

## 2024-11-29 DIAGNOSIS — M81.0 AGE-RELATED OSTEOPOROSIS WITHOUT CURRENT PATHOLOGICAL FRACTURE: ICD-10-CM

## 2024-11-29 DIAGNOSIS — E04.1 THYROID NODULE: Primary | ICD-10-CM

## 2024-11-29 DIAGNOSIS — I25.10 CORONARY ARTERY DISEASE INVOLVING NATIVE CORONARY ARTERY OF NATIVE HEART WITHOUT ANGINA PECTORIS: ICD-10-CM

## 2024-11-29 DIAGNOSIS — R79.89 LOW VITAMIN D LEVEL: ICD-10-CM

## 2024-11-29 DIAGNOSIS — I65.23 CAROTID ATHEROSCLEROSIS, BILATERAL: ICD-10-CM

## 2024-11-29 DIAGNOSIS — E66.812 CLASS 2 OBESITY WITH BODY MASS INDEX (BMI) OF 35.0 TO 35.9 IN ADULT, UNSPECIFIED OBESITY TYPE, UNSPECIFIED WHETHER SERIOUS COMORBIDITY PRESENT: ICD-10-CM

## 2024-11-29 DIAGNOSIS — E21.3 HYPERPARATHYROIDISM (HCC): ICD-10-CM

## 2024-11-29 PROCEDURE — G2211 COMPLEX E/M VISIT ADD ON: HCPCS | Performed by: INTERNAL MEDICINE

## 2024-11-29 PROCEDURE — 99214 OFFICE O/P EST MOD 30 MIN: CPT | Performed by: INTERNAL MEDICINE

## 2024-11-29 NOTE — TELEPHONE ENCOUNTER
In an effort to ensure that our patients [employer], a Team Member has reviewed your chart and identified an opportunity to provide the best care possible. An attempt was made to discuss or schedule due or overdue Preventive or Chronic Condition care.Care Gaps identified: Breast Cancer Screening.    The Outcome was Contact was not made, letter/portal message sent.  We are attempting to schedule a mammogram appointment. If you have any questions or need help with scheduling, contact your primary care provider..   Type of Appointment needed:  Imaging      Test ordered but please adv to wait till symptomatic,start 14 day quarantine now.

## 2024-11-29 NOTE — PROGRESS NOTES
Reason for Visit:  hypercalcemia, primary hyperparathyroid,  osteoporosis with high FRAX, thyroid nodule  Requesting Physician:   ..Caron Amezquita MD    CHIEF COMPLAINT:    Chief Complaint   Patient presents with    Hyperparathyroidism     F/u    Osteoporosis     F/u      HISTORY OF PRESENT ILLNESS:   Chandrika Strange is a 84 year old female who presents with hypercalcemia, primary hyperparathyroid,  osteoporosis with high FRAX, thyroid nodule  Breast ca s/p radiation, surgery and chemo.   She took aromatase inhibitors for 5 yrs -?2006  After last visit, CT showed Thyroid nodule,     We have few steps to complete after last visit but she did not not do them   We agreed to discuss one item only today     We referred to get FNA to Rt thyroid nodule.  She did not schedule it. She prefers to do US in 6 mo to monitor  She brought a list of concerns. We discussed them   Cannot lose wt   Has hair loss   Nails are brittle   Fatigue      We discussed thyroid vs parathyroid    7/16/2024 4D CT showed 2.6 cm Rt thyroid nodule and  possible thyroid adenoma   8/15/2024 US showed MNG Rt thyroid nodule meets criteria for FNA         She had DXA, serum ca/PTH, and 24 hr  urine ca.      Labs showed PTH-mediated hypercalcemia with osteopenia with high FRAX.   24-hr Urine ca is not low     She was dx with osteopenia on DXA in 2022. I reviewed and d/w pt her result osteopenia with FRAX 19 and 10%    DXA 6/2024 osteopenia with high FRAX  20%, and for hip fracture is 11%  Has GERD   Patient is on fall precaution.  Patient is taking Vitamin D3 1000 unit/day   Taking calcium /vit    No regular tums  use.   Likes dairy but not consuming it daily  Walks and doing balance exercise   Educated the patient to do wt-bearing exercise, but avoid falls.  Last dental appt 2024     Discussed hypercalcemia symptoms and she denied but maybe polyuria and polydipsia     Fragility fracture: no   Height loss:  0.5 inch  Denied risk factors: steroid use,  alcohol abuse, liver disease, kidney disease, hyperthyroid, seizure medications.   Family history of osteoporosis or fragility fracture: no but  mother had kidney  stones       ASSESSMENT AND PLAN:  84 year old female who presents with hypercalcemia, primary hyperparathyroid,  osteoporosis with high FRAX and thyroid nodule    She has multiple risk factors for osteoporosis - post MP, woman, breast ca, status AI use, high FRAX on recent DXA, recent w/u showed primary hyperparathyroid with high ca and high PTH.  24-hr Urine ca is not low   DXA 2024 osteopenia with high FRAX  20%, and for hip fracture is 11%    She has high calcium 11.1 levels trending up slowly since , eGFR 69 , albumin 4.4 and high . Vit D is low 28 but PTH and ca are high as well.     Discussed  FNA vs US to monitor thyroid nodule.               We need dental clearance before we start treatment for osteoporosis /Reclast    Will avoid oral bisphosphonate since she has GERD sx             4D CT scan for possible parathyroid adenoma   Pending  consult w/ ENT to discuss surgery option     Plan  Will discuss parathyroid next visit     For thyroid , Labs and US thyroid in 2025     Take vitamin D  2000 international unit a day and calcium 600 mg twice a day or 3 servings of dairy a day   Do weight bearing exercise   Follow fall precautions         PAST MEDICAL HISTORY:   Past Medical History:    Breast cancer (HCC)    SURGERY, RADIATION    Coronary atherosclerosis    High blood pressure    High cholesterol    History of perforated ear drum    Unspecified essential hypertension       PAST SURGICAL HISTORY:   Past Surgical History:   Procedure Laterality Date    Colonoscopy N/A 3/5/2018    Procedure: COLONOSCOPY;  Surgeon: Patricio Lassiter MD;  Location: [facility] ENDOSCOPY    Colonoscopy N/A 2022    Procedure: COLONOSCOPY;  Surgeon: Patricio Lassiter MD;  Location: [facility] ENDOSCOPY    Lumpectomy left            Other surgical  history      ankle sx    Other surgical history      ear surgery    Other surgical history      Mastoidectomy    Radiation left  2007       CURRENT MEDICATIONS:     [DISCONTINUED] Calcium Carbonate Antacid 1000 MG Oral Chew Tab Chew 1,000 mg by mouth 3 (three) times daily.         ALLERGIES:  No Known Allergies    SOCIAL HISTORY:    Social History     Socioeconomic History    Marital status:    Tobacco Use    Smoking status: Never    Smokeless tobacco: Never   Vaping Use    Vaping status: Never Used   Substance and Sexual Activity    Alcohol use: Yes     Alcohol/week: 0.0 standard drinks of alcohol     Comment: 1-3 glasses of wine/week    Drug use: No    Sexual activity: Not Currently     Partners: Male   Other Topics Concern    Caffeine Concern Yes     Comment: Coffee    Reaction to local anesthetic No       FAMILY HISTORY:   Family History   Problem Relation Age of Onset    Breast Cancer Self 65        lt breast    Other (Other) Mother     Cancer Father         Cancer-brain    Cancer Sister         Cancer-colon    Breast Cancer Daughter 55           PHYSICAL EXAM:   Height: 5' 4\" (162.6 cm) (11/29 0933)  Weight: 205 lb (93 kg) (11/29 0933)  BSA (Calculated - sq m): 1.98 sq meters (11/29 0933)  Pulse: 80 (11/29 0933)  BP: 124/80 (11/29 1002)  Temp: --  Do Not Use - Resp Rate: --  SpO2: --    Body mass index is 35.19 kg/m².       CONSTITUTIONAL:  Awake and alert. Age appropriate, good hygiene not in acute distress. Well-nourished and well developed. no acute distress   PSYCH:   Orientated to time, place, person & situation, Normal mood and affect, memory intact, normal insight and judgment, cooperative       DATA:     Pertinent data reviewed   08/15/24 11:16   US THYROID (CPT=76536) Rpt   Rpt: View report in Results Review for more information   07/16/24 13:54   CT SOFT TISSUE OF NECK 4D (W+WO) (CPT=70492) Rpt   Rpt: View report in Results Review for more information  4/3/24  7:35 AM Resulting Agency     Calcium Urine Calc  100 - 300 mg/24hr [facility] ([facility])   Urine Volume 24Hr  mL 2,050       06/28/24 08:24   XR DEXA BONE DENSITOMETRY (CPT=77080) left femoral neck suggest osteopenia, there may be increased fracture risk.  There has been decrease in the BMD by 1.2% from previous study.  Findings in the total left hip suggest osteopenia, there may be increased fracture risk.    There has been decrease in the BMD by 2.2% from previous study.  The 10 year fracture risk for major osteoporotic fracture is 20%, and for hip fracture is 11%.   2. Findings in the total AP lumbar spine are in the normal range, and there is no increased fracture risk.  There has been decrease in the BMD by 1.5% from previous study.       Latest Reference Range & Units 04/04/24 07:01   CREATININE 0.55 - 1.02 mg/dL 0.84   CALCIUM 8.7 - 10.4 mg/dL 11.2 (H)   BUN/CREATININE RATIO 10.0 - 20.0  20.2 (H)   EGFR >=60 mL/min/1.73m2 69   (H): Data is abnormally high   Latest Reference Range & Units 04/04/24 07:01   PTH INTACT 18.5 - 88.0 pg/mL 139.9 (H)   (H): Data is abnormally high   Latest Reference Range & Units 03/06/24 06:25   VITAMIN D, 25-OH, TOTAL 30.0 - 100.0 ng/mL 28.5 (L)   (L): Data is abnormally low   Latest Reference Range & Units 04/03/24 07:35   CREAT UR CALC 0.60 - 1.80 g/24 hr 0.99   Urine Volume 24Hr mL  mL 2,050  2,050   CALCIUM URINE CALC 100 - 300 mg/24hr 185            Latest Reference Range & Units 02/28/23 07:31 03/06/24 06:25   CALCIUM 8.7 - 10.4 mg/dL 10.4 (H) 11.1 (H)   BUN/CREATININE RATIO 10.0 - 20.0  20.5 (H) 20.2 (H)   EGFR >=60 mL/min/1.73m2 66 69      Latest Reference Range & Units 02/28/23 07:31 03/06/24 06:25   ALKALINE PHOSPHATASE 55 - 142 U/L 71 66      Latest Reference Range & Units 02/28/23 07:31 03/06/24 06:25   Albumin 3.2 - 4.8 g/dL 3.6 4.4   VITAMIN D, 25-OH, TOTAL 30.0 - 100.0 ng/mL  28.5 (L)      Latest Reference Range & Units 02/28/23 07:31 03/06/24 06:25 03/18/24 13:02   T4,Free (Direct) 0.8 - 1.7  ng/dL 0.8 0.9    TSH 0.550 - 4.780 mIU/mL 4.280 (H) 5.333 (H)    PTH INTACT 18.5 - 88.0 pg/mL   158.4 (H)         No results for input(s): \"TSH\", \"T4F\", \"T3F\", \"THYP\" in the last 72 hours.  No results found.    Orders Placed This Encounter   Procedures    TSH W Reflex To Free T4     Orders Placed This Encounter    TSH W Reflex To Free T4     Standing Status:   Future     Standing Expiration Date:   11/29/2025    NIACINAMIDE-ZINC-COPPER-FA OR     Sig: Take by mouth.          This is a specialized patient consultation in endocrinology and required comprehensive review of prior records, as well as current evaluation, with time required for consideration of complex endocrine issues and consultation. For this visit, I personally interviewed the patient, and family member if accompanied, performed the pertinent parts of the history and physical examination. ROS included screening for appropriate endocrine conditions.   Today's diagnosis and plan were reviewed in detail with the patient who states understanding and agrees with plan. I discussed with the patient possible diagnosis, differential diagnosis, need for work up, treatment options, alternatives and side effects.     Please see note for details about time spent which includes:   · pre-visit preparation  · reviewing records  · face to face time with the patient   · timely documentation of the encounter  · ordering medications/tests  · communication with care team  · care coordination    I appreciate the opportunity to be part of your patient's medical care and will keep you, as the referring and primary physicians, informed about the care of your patient. Please feel free to contact me should you have any questions.      Claudio Beth MD

## 2024-12-27 ENCOUNTER — OFFICE VISIT (OUTPATIENT)
Facility: CLINIC | Age: 84
End: 2024-12-27

## 2024-12-27 VITALS
DIASTOLIC BLOOD PRESSURE: 68 MMHG | BODY MASS INDEX: 35.17 KG/M2 | SYSTOLIC BLOOD PRESSURE: 150 MMHG | HEART RATE: 77 BPM | HEIGHT: 64 IN | WEIGHT: 206 LBS

## 2024-12-27 DIAGNOSIS — E03.8 SUBCLINICAL HYPOTHYROIDISM: ICD-10-CM

## 2024-12-27 DIAGNOSIS — E21.3 HYPERPARATHYROIDISM (HCC): Primary | ICD-10-CM

## 2024-12-27 DIAGNOSIS — C50.919 MALIGNANT NEOPLASM OF FEMALE BREAST, UNSPECIFIED ESTROGEN RECEPTOR STATUS, UNSPECIFIED LATERALITY, UNSPECIFIED SITE OF BREAST (HCC): ICD-10-CM

## 2024-12-27 DIAGNOSIS — E66.812 CLASS 2 OBESITY WITH BODY MASS INDEX (BMI) OF 35.0 TO 35.9 IN ADULT, UNSPECIFIED OBESITY TYPE, UNSPECIFIED WHETHER SERIOUS COMORBIDITY PRESENT: ICD-10-CM

## 2024-12-27 DIAGNOSIS — I25.10 CORONARY ARTERY DISEASE INVOLVING NATIVE CORONARY ARTERY OF NATIVE HEART WITHOUT ANGINA PECTORIS: ICD-10-CM

## 2024-12-27 DIAGNOSIS — E83.52 HYPERCALCEMIA: ICD-10-CM

## 2024-12-27 DIAGNOSIS — R79.89 LOW VITAMIN D LEVEL: ICD-10-CM

## 2024-12-27 DIAGNOSIS — M81.0 AGE-RELATED OSTEOPOROSIS WITHOUT CURRENT PATHOLOGICAL FRACTURE: ICD-10-CM

## 2024-12-27 PROCEDURE — G2211 COMPLEX E/M VISIT ADD ON: HCPCS | Performed by: INTERNAL MEDICINE

## 2024-12-27 PROCEDURE — 99214 OFFICE O/P EST MOD 30 MIN: CPT | Performed by: INTERNAL MEDICINE

## 2024-12-27 NOTE — PROGRESS NOTES
Reason for Visit:  hypercalcemia, primary hyperparathyroid,  osteoporosis with high FRAX, thyroid nodule  Requesting Physician:   ..Caron Amezquita MD    CHIEF COMPLAINT:    Chief Complaint   Patient presents with    Thyroid Problem     F/u      HISTORY OF PRESENT ILLNESS:   Chandrika Strange is a 84 year old female who presents with hypercalcemia, primary hyperparathyroid,  osteoporosis with high FRAX, thyroid nodule  Breast ca s/p radiation, surgery and chemo.   She took aromatase inhibitors for 5 yrs -?2006    She was overwhelmed with thyroid nodule, parathyroid and osteoporosis     She is here to discuss parathyroid and osteoporosis   Denied denied hypercalcemia symptoms.   Hair loss, white hair, and cannot lose wt.   Nails are brittle and ftigue   On vitd/ca/mg      She does not want surgery for parathyroid.   7/16/2024 4D CT showed 2.6 cm Rt thyroid nodule and  possible thyroid adenoma   8/15/2024 US showed MNG Rt thyroid nodule meets criteria for FNA         She had DXA, serum ca/PTH, and 24 hr  urine ca.   Labs showed PTH-mediated hypercalcemia with osteopenia with high FRAX.   24-hr Urine ca is not low   She was dx with osteopenia on DXA in 2022. I reviewed and d/w pt her result osteopenia with FRAX 19 and 10%    DXA 6/2024 osteopenia with high FRAX  20%, and for hip fracture is 11%  Has GERD   Patient is on fall precaution.  Patient is taking Vitamin D3 1000 unit/day    Walks and doing balance exercise   Educated the patient to do wt-bearing exercise, but avoid falls.  Last dental appt 2024      Fragility fracture: no   Height loss:  0.5 inch  Denied risk factors: steroid use, alcohol abuse, liver disease, kidney disease, hyperthyroid, seizure medications.   Family history of osteoporosis or fragility fracture: no but  mother had kidney  stones    CT showed Thyroid nodule,      We referred to get FNA to Rt thyroid nodule.  She did not schedule it. She prefers to do US in 6 mo to monitor     ASSESSMENT  AND PLAN:  84 year old female who presents with hypercalcemia, primary hyperparathyroid,  osteoporosis with high FRAX and thyroid nodule    She has multiple risk factors for osteoporosis - post MP, woman, breast ca, status AI use, high FRAX on recent DXA, recent w/u showed primary hyperparathyroid with high ca and high PTH.  24-hr Urine ca is not low   DXA 2024 osteopenia with high FRAX  20%, and for hip fracture is 11%    She has high calcium 11.1 levels trending up slowly since , eGFR 69 , albumin 4.4 and high . Vit D is low 28 but PTH and ca are high as well.      She understands her dx , indication to treat, risk/benefit from treatment and side effect if hyperparathyroid is untreated                        Plan   For thyroid , Labs and US thyroid in 2025   We need dental clearance before we start treatment for osteoporosis /Reclast    Will avoid oral bisphosphonate since she has GERD      Take vitamin D  2000 international unit a day and calcium 600 mg twice a day or 3 servings of dairy a day   Do weight bearing exercise   Follow fall precautions     Take vitamin D  2000 international unit a day and calcium 600 mg twice a day or 3 servings of dairy a day   Do weight bearing exercise   Follow fall precautions         PAST MEDICAL HISTORY:   Past Medical History:    Breast cancer (HCC)    SURGERY, RADIATION    Coronary atherosclerosis    High blood pressure    High cholesterol    History of perforated ear drum    Unspecified essential hypertension       PAST SURGICAL HISTORY:   Past Surgical History:   Procedure Laterality Date    Colonoscopy N/A 3/5/2018    Procedure: COLONOSCOPY;  Surgeon: Patricio Lassiter MD;  Location: [facility] ENDOSCOPY    Colonoscopy N/A 2022    Procedure: COLONOSCOPY;  Surgeon: Patricio Lassiter MD;  Location: [facility] ENDOSCOPY    Lumpectomy left            Other surgical history      ankle sx    Other surgical history      ear surgery    Other surgical history       Mastoidectomy    Radiation left  2007       CURRENT MEDICATIONS:     rosuvastatin 20 MG Oral Tab Take 1 tablet (20 mg total) by mouth nightly. 90 tablet 3    lisinopril-hydroCHLOROthiazide 20-25 MG Oral Tab Take 1 tablet by mouth daily. 90 tablet 3       ALLERGIES:  No Known Allergies    SOCIAL HISTORY:    Social History     Socioeconomic History    Marital status:    Tobacco Use    Smoking status: Never    Smokeless tobacco: Never   Vaping Use    Vaping status: Never Used   Substance and Sexual Activity    Alcohol use: Yes     Alcohol/week: 0.0 standard drinks of alcohol     Comment: 1-3 glasses of wine/week    Drug use: No    Sexual activity: Not Currently     Partners: Male   Other Topics Concern    Caffeine Concern Yes     Comment: Coffee    Reaction to local anesthetic No       FAMILY HISTORY:   Family History   Problem Relation Age of Onset    Breast Cancer Self 65        lt breast    Other (Other) Mother     Cancer Father         Cancer-brain    Cancer Sister         Cancer-colon    Breast Cancer Daughter 55           PHYSICAL EXAM:   Height: 5' 4\" (162.6 cm) (12/27 1427)  Weight: 206 lb (93.4 kg) (12/27 1427)  BSA (Calculated - sq m): 1.98 sq meters (12/27 1427)  Pulse: 77 (12/27 1427)  BP: 150/68 (12/27 1427)  Temp: --  Do Not Use - Resp Rate: --  SpO2: --    Body mass index is 35.36 kg/m².       CONSTITUTIONAL:  Awake and alert. Age appropriate, good hygiene not in acute distress. Well-nourished and well developed. no acute distress   PSYCH:   Orientated to time, place, person & situation, Normal mood and affect, memory intact, normal insight and judgment, cooperative       DATA:     Pertinent data reviewed   08/15/24 11:16   US THYROID (CPT=76536) Rpt   Rpt: View report in Results Review for more information   07/16/24 13:54   CT SOFT TISSUE OF NECK 4D (W+WO) (CPT=70492) Rpt   Rpt: View report in Results Review for more information  4/3/24  7:35 AM Resulting Agency    Calcium Urine Calc  100 -  300 mg/24hr [facility] ([facility])   Urine Volume 24Hr  mL 2,050       06/28/24 08:24   XR DEXA BONE DENSITOMETRY (CPT=77080) left femoral neck suggest osteopenia, there may be increased fracture risk.  There has been decrease in the BMD by 1.2% from previous study.  Findings in the total left hip suggest osteopenia, there may be increased fracture risk.    There has been decrease in the BMD by 2.2% from previous study.  The 10 year fracture risk for major osteoporotic fracture is 20%, and for hip fracture is 11%.   2. Findings in the total AP lumbar spine are in the normal range, and there is no increased fracture risk.  There has been decrease in the BMD by 1.5% from previous study.       Latest Reference Range & Units 04/04/24 07:01   CREATININE 0.55 - 1.02 mg/dL 0.84   CALCIUM 8.7 - 10.4 mg/dL 11.2 (H)   BUN/CREATININE RATIO 10.0 - 20.0  20.2 (H)   EGFR >=60 mL/min/1.73m2 69   (H): Data is abnormally high   Latest Reference Range & Units 04/04/24 07:01   PTH INTACT 18.5 - 88.0 pg/mL 139.9 (H)   (H): Data is abnormally high   Latest Reference Range & Units 03/06/24 06:25   VITAMIN D, 25-OH, TOTAL 30.0 - 100.0 ng/mL 28.5 (L)   (L): Data is abnormally low   Latest Reference Range & Units 04/03/24 07:35   CREAT UR CALC 0.60 - 1.80 g/24 hr 0.99   Urine Volume 24Hr mL  mL 2,050  2,050   CALCIUM URINE CALC 100 - 300 mg/24hr 185            Latest Reference Range & Units 02/28/23 07:31 03/06/24 06:25   CALCIUM 8.7 - 10.4 mg/dL 10.4 (H) 11.1 (H)   BUN/CREATININE RATIO 10.0 - 20.0  20.5 (H) 20.2 (H)   EGFR >=60 mL/min/1.73m2 66 69      Latest Reference Range & Units 02/28/23 07:31 03/06/24 06:25   ALKALINE PHOSPHATASE 55 - 142 U/L 71 66      Latest Reference Range & Units 02/28/23 07:31 03/06/24 06:25   Albumin 3.2 - 4.8 g/dL 3.6 4.4   VITAMIN D, 25-OH, TOTAL 30.0 - 100.0 ng/mL  28.5 (L)      Latest Reference Range & Units 02/28/23 07:31 03/06/24 06:25 03/18/24 13:02   T4,Free (Direct) 0.8 - 1.7 ng/dL 0.8 0.9    TSH 0.550  - 4.780 mIU/mL 4.280 (H) 5.333 (H)    PTH INTACT 18.5 - 88.0 pg/mL   158.4 (H)         No results for input(s): \"TSH\", \"T4F\", \"T3F\", \"THYP\" in the last 72 hours.  No results found.    No orders of the defined types were placed in this encounter.    No orders of the defined types were placed in this encounter.         This is a specialized patient consultation in endocrinology and required comprehensive review of prior records, as well as current evaluation, with time required for consideration of complex endocrine issues and consultation. For this visit, I personally interviewed the patient, and family member if accompanied, performed the pertinent parts of the history and physical examination. ROS included screening for appropriate endocrine conditions.   Today's diagnosis and plan were reviewed in detail with the patient who states understanding and agrees with plan. I discussed with the patient possible diagnosis, differential diagnosis, need for work up, treatment options, alternatives and side effects.     Please see note for details about time spent which includes:   · pre-visit preparation  · reviewing records  · face to face time with the patient   · timely documentation of the encounter  · ordering medications/tests  · communication with care team  · care coordination    I appreciate the opportunity to be part of your patient's medical care and will keep you, as the referring and primary physicians, informed about the care of your patient. Please feel free to contact me should you have any questions.      Claudio Beth MD

## 2025-01-14 NOTE — TELEPHONE ENCOUNTER
rosuvastatin 20 MG Oral Tab, Take 1 tablet (20 mg total) by mouth nightly., Disp: 90 tablet, Rfl: 3

## 2025-01-17 ENCOUNTER — PATIENT MESSAGE (OUTPATIENT)
Dept: OTHER | Age: 85
End: 2025-01-17

## 2025-01-17 RX ORDER — ROSUVASTATIN CALCIUM 20 MG/1
20 TABLET, COATED ORAL NIGHTLY
Qty: 90 TABLET | Refills: 1 | Status: SHIPPED | OUTPATIENT
Start: 2025-01-17

## 2025-01-17 RX ORDER — ROSUVASTATIN CALCIUM 20 MG/1
20 TABLET, COATED ORAL NIGHTLY
Qty: 90 TABLET | Refills: 3 | OUTPATIENT
Start: 2025-01-17

## 2025-01-17 NOTE — TELEPHONE ENCOUNTER
Patient is no longer using [employer] [facility] mail order and is using [facility].  Patient requests rosu[facility]atin [facility].   Offered to send b/p medication as well and patient declined and states has medication on hand.    Pharmacy updated on chart.

## 2025-01-17 NOTE — TELEPHONE ENCOUNTER
Patient calling asking for an update on this medication, she wanted to ensure we received the request. she states she is not out.

## 2025-01-27 ENCOUNTER — TELEPHONE (OUTPATIENT)
Dept: ENDOCRINOLOGY CLINIC | Facility: CLINIC | Age: 85
End: 2025-01-27

## 2025-01-27 ENCOUNTER — TELEPHONE (OUTPATIENT)
Facility: LOCATION | Age: 85
End: 2025-01-27

## 2025-01-27 RX ORDER — ZOLEDRONIC ACID 0.05 MG/ML
5 INJECTION, SOLUTION INTRAVENOUS ONCE
OUTPATIENT
Start: 2025-01-27

## 2025-01-27 NOTE — TELEPHONE ENCOUNTER
Received dental clearance letter dated on 1/24/25. The patient is free of active dental infection or need for further dental treatments and is cleared to receive reclast/prolia/evenity. Placed in MD folder for review.

## 2025-01-31 ENCOUNTER — TELEPHONE (OUTPATIENT)
Dept: RHEUMATOLOGY | Facility: CLINIC | Age: 85
End: 2025-01-31

## 2025-01-31 NOTE — TELEPHONE ENCOUNTER
Patient called and her whole body aches. Patient feels like she cannot do anything. Patient said she feels like her whole body is inflamed. Please advise

## 2025-01-31 NOTE — TELEPHONE ENCOUNTER
Please advise.    LOV: 3/18/24  Future Appointments   Date Time Provider Department Center   2/7/2025 10:00 AM [facility] RM5 [facility] EM [facility]   3/28/2025 10:15 AM Claudio Beth MD [facility]   6/2/2025  9:20 AM Lexii Delatorre MD [facility]     Labs:         Instructions    You were seen today for pain in your hands, thumbs and right middle finger  Symptoms are more from osteoarthritis, less likely rheumatoid arthritis  Blood work and x-rays today  I will send you message in [employer] regarding the results  If the thumbs or middle finger get worse we can always inject it with cortisone

## 2025-02-01 NOTE — TELEPHONE ENCOUNTER
Called patient back.  She was having diffuse joint and muscle pain.  Her symptoms have improved though    She has an appointment in June.  If we can schedule her an earlier appointment or put her on a waiting list to be seen sooner

## 2025-02-03 ENCOUNTER — TELEPHONE (OUTPATIENT)
Age: 85
End: 2025-02-03

## 2025-02-03 ENCOUNTER — OFFICE VISIT (OUTPATIENT)
Dept: RHEUMATOLOGY | Facility: CLINIC | Age: 85
End: 2025-02-03

## 2025-02-03 ENCOUNTER — LAB ENCOUNTER (OUTPATIENT)
Dept: LAB | Facility: HOSPITAL | Age: 85
End: 2025-02-03
Attending: INTERNAL MEDICINE
Payer: MEDICARE

## 2025-02-03 VITALS
RESPIRATION RATE: 16 BRPM | HEART RATE: 81 BPM | BODY MASS INDEX: 35.19 KG/M2 | SYSTOLIC BLOOD PRESSURE: 153 MMHG | HEIGHT: 64 IN | DIASTOLIC BLOOD PRESSURE: 68 MMHG | WEIGHT: 206.13 LBS

## 2025-02-03 DIAGNOSIS — M79.642 BILATERAL HAND PAIN: ICD-10-CM

## 2025-02-03 DIAGNOSIS — E04.1 THYROID NODULE: ICD-10-CM

## 2025-02-03 DIAGNOSIS — M81.0 AGE-RELATED OSTEOPOROSIS WITHOUT CURRENT PATHOLOGICAL FRACTURE: ICD-10-CM

## 2025-02-03 DIAGNOSIS — M79.641 BILATERAL HAND PAIN: ICD-10-CM

## 2025-02-03 DIAGNOSIS — M25.50 POLYARTHRALGIA: Primary | ICD-10-CM

## 2025-02-03 LAB
ALBUMIN SERPL-MCNC: 4.5 G/DL (ref 3.2–4.8)
CALCIUM BLD-MCNC: 10.7 MG/DL (ref 8.7–10.4)
CREAT BLD-MCNC: 0.95 MG/DL
CRP SERPL-MCNC: <0.4 MG/DL (ref ?–1)
EGFRCR SERPLBLD CKD-EPI 2021: 59 ML/MIN/1.73M2 (ref 60–?)
ERYTHROCYTE [SEDIMENTATION RATE] IN BLOOD: 19 MM/HR
TSI SER-ACNC: 3.55 UIU/ML (ref 0.55–4.78)

## 2025-02-03 PROCEDURE — 82565 ASSAY OF CREATININE: CPT

## 2025-02-03 PROCEDURE — 99214 OFFICE O/P EST MOD 30 MIN: CPT | Performed by: INTERNAL MEDICINE

## 2025-02-03 PROCEDURE — 36415 COLL VENOUS BLD VENIPUNCTURE: CPT | Performed by: INTERNAL MEDICINE

## 2025-02-03 PROCEDURE — 82040 ASSAY OF SERUM ALBUMIN: CPT

## 2025-02-03 PROCEDURE — 82310 ASSAY OF CALCIUM: CPT

## 2025-02-03 PROCEDURE — G2211 COMPLEX E/M VISIT ADD ON: HCPCS | Performed by: INTERNAL MEDICINE

## 2025-02-03 PROCEDURE — 85652 RBC SED RATE AUTOMATED: CPT | Performed by: INTERNAL MEDICINE

## 2025-02-03 PROCEDURE — 84443 ASSAY THYROID STIM HORMONE: CPT

## 2025-02-03 PROCEDURE — 86140 C-REACTIVE PROTEIN: CPT | Performed by: INTERNAL MEDICINE

## 2025-02-03 RX ORDER — MELOXICAM 7.5 MG/1
7.5 TABLET ORAL 2 TIMES DAILY
Qty: 60 TABLET | Refills: 0 | Status: SHIPPED | OUTPATIENT
Start: 2025-02-03

## 2025-02-03 NOTE — PROGRESS NOTES
Chandrika Strange is a 84 year old female.    HPI:     Chief Complaint   Patient presents with    Joint Pain     having diffuse joint and muscle pain       I had the pleasure of seeing Chandrika Strange on 2/3/2025 for follow up diffuse body aches.  She was initially seen for bilateral hand pain mostly in her right hand.  Rheumatoid factor was slightly positive ultrasound the hand showed findings of OA    Current Medications:  Tylenol as needed   Blood work:  Neg ESR, CRP, CCP  RF 21  US hands: Findings were more consistent with osteoarthritis    Interval History:  This is a 82 yo F with hx of HTN, HLD, CAD s/p PCI, Right wrist fracture s/p surgery Oct 2023, Left ankle fracture s/p surgery presents with polyarghralgia's.  She reports pain involving both her hands particular her right hand.  The right middle finger can get stuck and trigger.  She cannot fully extend her right middle finger.  She is able to make a fist.  She also has pain in both thumbs.  Pain is constant but not too painful.  She continues to stay active, does exercises with her hands which helps.  She was seen by orthopedic and physiatry in 2022 due to pain in her left hip and thigh.  She was given meloxicam for about a month and her symptoms improved significantly.  She now has some slight numbness in the left lateral thigh.  Denies any other joint pain or swelling.  Denies any history of psoriasis.    2/3/2025:  Presents for follow-up of joint pain  She was seen for bilateral hand pain.  RF was mildly positive.   Hand pain overall has improved   Ultrasound of the hand showed findings consistent with OA  She recently started to have diffuse bodyaches, states that every part of her body ached. Symptoms have improved  At times will will have stiffness in the neck, arms and legs  Symptoms come and go  She takes tylenol as needed for pain            HISTORY:  Past Medical History:    Breast cancer (HCC)    SURGERY, RADIATION    Coronary atherosclerosis    High  blood pressure    High cholesterol    History of perforated ear drum    Unspecified essential hypertension      Social Hx Reviewed   Family Hx Reviewed     Medications (Active prior to today's visit):  Current Outpatient Medications   Medication Sig Dispense Refill    rosuvastatin 20 MG Oral Tab Take 1 tablet (20 mg total) by mouth nightly. 90 tablet 1    NIACINAMIDE-ZINC-COPPER-FA OR Take by mouth.      Meloxicam 15 MG Oral Tab Take 1 tablet (15 mg total) by mouth daily. 30 tablet 0    Boswellia Hermila Extract Does not apply Powder       lisinopril-hydroCHLOROthiazide 20-25 MG Oral Tab Take 1 tablet by mouth daily. 90 tablet 3    Zinc Sulfate (ZINC 15 OR) Take by mouth.      aspirin 81 MG Oral Tab EC Take 1 tablet (81 mg total) by mouth daily. 30 tablet 1    Cyanocobalamin (VITAMIN B-12 OR) Take by mouth.      Boswellia Hermila (BOSWELLIA OR) Take by mouth.      Coenzyme Q10 (COQ10 OR) Take  by mouth.      omega-3 fatty acids (FISH OIL) 1000 MG Oral Cap Take 1,000 mg by mouth daily.       .cmed  Allergies:  Allergies[1]      ROS:   All other ROS are negative.     PHYSICAL EXAM:   GEN: AAOx3, NAD  HEENT: EOMI, PERRLA, no injection or icterus, oral mucosa moist, no oral lesions. No lymphadenopathy. No facial rash  CVS: RRR, no murmurs rubs or gallops. Equal 2+ distal pulses.   LUNGS: CTAB, no increased work of breathing  ABDOMEN:  soft NT/ND, +BS, no HSM  SKIN: No rashes or skin lesions. No nail findings  MSK:  B/L CMC squaring, nonTTP  R 3rd MCP base TTP, no triggering right now   NEURO: Cranial nerves II-XII intact grossly. 5/5 strength throughout in both upper and lower extremities, sensation intact.  PSYCH: normal mood       LABS:     Component      Latest Ref Rng 3/18/2024   C-REACTIVE PROTEIN      <1.00 mg/dL <0.40    C-Citrullinated Peptide IgG AB      0.0 - 6.9 U/mL 1.2    SED RATE      0 - 30 mm/Hr 19    RHEUMATOID FACTOR      <14 IU/mL 21 (H)         Imaging:     US hands 7/2024:  1. Relatively symmetric  marginal osteophyte formation again seen in both hands and wrists with greatest involvement of the basal thumb and interphalangeal joints.  There is also milder involvement of the MCP joints.  There is mild synovial thickening   involving some of the MCP joints without associated hyperemia to suggest active synovitis.  Additionally, no periarticular erosions are seen.  The constellation of these findings favors osteoarthritis including erosive osteoarthritis over rheumatoid   arthritis, however correlate clinically with laboratory workup.   2. 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     ASSESSMENT/PLAN:     Diffuse body pain, possible generalized osteoarthritis  - She had workup for RA and it was negative  - Currently has some mild shoulder pain and stiffness otherwise minimal joint pain  - She will try meloxicam as needed for her pain  - Plan to repeat inflammation markers    B/L hand pain 2/2 OA  - Found to have a mildly positive RF of 21.  Ultrasound of the hand was done showing more evidence of osteoarthritis  - Currently hand pain is not severe.  Most of the pain is in the CMC joint.  No swelling or synovitis on exam.  - We will continue to monitor her joint pain    Pt will f/u in 4 mos     There is a longitudinal care relationship with me, the care plan reflects the ongoing nature of the continuous relationship of care, and the medical record indicates that there is ongoing treatment of a serious/complex medical condition which I am currently managing.  is Applicable.     Lexii Delatorre MD  2/3/2025   10:36 AM                 [1] No Known Allergies

## 2025-02-03 NOTE — PATIENT INSTRUCTIONS
You were seen today for diffuse muscle and joint pain  Your hand pain has improved  Lets get further blood work  For now take Tylenol  If the pain is significant like last week take the meloxicam once or twice a day but not more than that

## 2025-02-03 NOTE — TELEPHONE ENCOUNTER
Patient called back and states she wants to speak with doctor on 3-28 when she sees him to discuss this infusion before scheduling. Informed her that is fine the order is good for year and to give a call when she is ready to schedule.

## 2025-02-07 ENCOUNTER — HOSPITAL ENCOUNTER (OUTPATIENT)
Dept: ULTRASOUND IMAGING | Facility: HOSPITAL | Age: 85
Discharge: HOME | End: 2025-02-07
Attending: INTERNAL MEDICINE
Payer: MEDICARE

## 2025-02-07 DIAGNOSIS — E04.1 THYROID NODULE: ICD-10-CM

## 2025-02-07 PROCEDURE — 76536 US EXAM OF HEAD AND NECK: CPT | Performed by: INTERNAL MEDICINE

## 2025-02-28 ENCOUNTER — TELEPHONE (OUTPATIENT)
Dept: ENDOCRINOLOGY CLINIC | Facility: CLINIC | Age: 85
End: 2025-02-28

## 2025-02-28 ENCOUNTER — OFFICE VISIT (OUTPATIENT)
Facility: CLINIC | Age: 85
End: 2025-02-28

## 2025-02-28 VITALS
SYSTOLIC BLOOD PRESSURE: 130 MMHG | HEIGHT: 64 IN | BODY MASS INDEX: 35.17 KG/M2 | WEIGHT: 206 LBS | HEART RATE: 102 BPM | DIASTOLIC BLOOD PRESSURE: 62 MMHG

## 2025-02-28 DIAGNOSIS — R73.09 HIGH GLUCOSE LEVEL: ICD-10-CM

## 2025-02-28 DIAGNOSIS — E66.812 CLASS 2 OBESITY WITH BODY MASS INDEX (BMI) OF 35.0 TO 35.9 IN ADULT, UNSPECIFIED OBESITY TYPE, UNSPECIFIED WHETHER SERIOUS COMORBIDITY PRESENT: ICD-10-CM

## 2025-02-28 DIAGNOSIS — E83.52 HYPERCALCEMIA: ICD-10-CM

## 2025-02-28 DIAGNOSIS — E21.3 HYPERPARATHYROIDISM (HCC): ICD-10-CM

## 2025-02-28 DIAGNOSIS — I25.10 CORONARY ARTERY DISEASE INVOLVING NATIVE CORONARY ARTERY OF NATIVE HEART WITHOUT ANGINA PECTORIS: ICD-10-CM

## 2025-02-28 DIAGNOSIS — E07.9 THYROID DISEASE: ICD-10-CM

## 2025-02-28 DIAGNOSIS — I10 PRIMARY HYPERTENSION: ICD-10-CM

## 2025-02-28 DIAGNOSIS — E04.1 THYROID NODULE: ICD-10-CM

## 2025-02-28 DIAGNOSIS — R79.89 LOW VITAMIN D LEVEL: ICD-10-CM

## 2025-02-28 DIAGNOSIS — C50.919 MALIGNANT NEOPLASM OF FEMALE BREAST, UNSPECIFIED ESTROGEN RECEPTOR STATUS, UNSPECIFIED LATERALITY, UNSPECIFIED SITE OF BREAST (HCC): ICD-10-CM

## 2025-02-28 DIAGNOSIS — M81.0 AGE-RELATED OSTEOPOROSIS WITHOUT CURRENT PATHOLOGICAL FRACTURE: Primary | ICD-10-CM

## 2025-02-28 PROCEDURE — 99214 OFFICE O/P EST MOD 30 MIN: CPT | Performed by: INTERNAL MEDICINE

## 2025-02-28 PROCEDURE — G2211 COMPLEX E/M VISIT ADD ON: HCPCS | Performed by: INTERNAL MEDICINE

## 2025-02-28 NOTE — PROGRESS NOTES
Reason for Visit:  hypercalcemia, primary hyperparathyroid,  osteoporosis with high FRAX, thyroid nodule  Requesting Physician:   ..Caron Amezquita MD    CHIEF COMPLAINT:    Chief Complaint   Patient presents with    Hyperparathyroidism     F/u      HISTORY OF PRESENT ILLNESS:   Chandrika Strange is a 84 year old female who presents with hypercalcemia, primary hyperparathyroid,  osteoporosis with high FRAX, thyroid nodule  Breast ca s/p radiation, surgery and chemo.   She took aromatase inhibitors for 5 yrs -?2006  Dental clearance 1/24/2025  We placed orders for reclast. She has not scheduled it yet. She is here to discuss tx , side effect and what to expect.   She had US thyroid. We discussed the findings today.         Denied denied hypercalcemia symptoms.   She does not want surgery for parathyroid.      On vit D      7/16/2024 4D CT showed 2.6 cm Rt thyroid nodule and  possible thyroid adenoma   8/15/2024 US showed MNG Rt thyroid nodule meets criteria for FNA         She had DXA, serum ca/PTH, and 24 hr  urine ca.   Labs showed PTH-mediated hypercalcemia with osteopenia with high FRAX.   24-hr Urine ca is not low   She was dx with osteopenia on DXA in 2022. I reviewed and d/w pt her result osteopenia with FRAX 19 and 10%    DXA 6/2024 osteopenia with high FRAX  20%, and for hip fracture is 11%  Has GERD   Patient is on fall precaution.  Patient is taking Vitamin D3 1000 unit/day    Walks and doing balance exercise   Educated the patient to do wt-bearing exercise, but avoid falls.   Fragility fracture: no   Height loss:  0.5 inch  Denied risk factors: steroid use, alcohol abuse, liver disease, kidney disease, hyperthyroid, seizure medications.   Family history of osteoporosis or fragility fracture: no but  mother had kidney  stones    CT showed Thyroid nodule,      We referred to get FNA to Rt thyroid nodule.  She did not schedule it. She prefers to do US in 6 mo to monitor     02/07/25 10:28   US THYROID  (CPT=76536) Lobulated isoechoic interpolar nodule 1.9 x 1.4 x 1.9 centimeter, not significantly changed    Predominantly isoechoic nodule or normal thyroid tissue at the lower pole 1 x 0.8 x 0.9 centimeter, not significantly changed          ASSESSMENT AND PLAN:  84 year old female who presents with hypercalcemia, primary hyperparathyroid,  osteoporosis with high FRAX and thyroid nodule    She has multiple risk factors for osteoporosis - post MP, woman, breast ca, status AI use, high FRAX on recent DXA, recent w/u showed primary hyperparathyroid with high ca and high PTH.  24-hr Urine ca is not low   DXA 6/2024 osteopenia with high FRAX  20%, and for hip fracture is 11%  She has high calcium 11.1 levels trending up slowly since 2012, eGFR 69 , albumin 4.4 and high . Vit D is low 28 but PTH and ca are high as well.   She understands her dx , indication to treat, risk/benefit from treatment and side effect if hyperparathyroid is untreated  Will avoid oral bisphosphonate since she has GERD         US thyroid 2/7/2025 stable nodules       Plan   We have dental clearance for Reclast. We provided with # to schedule the infusion. Encouraged the pt to be well hydrated before. Discussed side effect.        Take vitamin D  2000 international unit a day   3 servings of dairy a day   Do weight bearing exercise   Follow fall precautions       For thyroid nodules, she has multiple nodules. Some nodules are subCM.  The larger ones are stable on US in 2/2025. Will monitor in 1 year 2/2026 or sooner if there is change in clinical status.     PAST MEDICAL HISTORY:   Past Medical History:    Breast cancer (HCC)    SURGERY, RADIATION    Coronary atherosclerosis    High blood pressure    High cholesterol    History of perforated ear drum    Unspecified essential hypertension       PAST SURGICAL HISTORY:   Past Surgical History:   Procedure Laterality Date    Colonoscopy N/A 3/5/2018    Procedure: COLONOSCOPY;  Surgeon:  Patricio Lassiter MD;  Location: [facility] ENDOSCOPY    Colonoscopy N/A 2022    Procedure: COLONOSCOPY;  Surgeon: Patricio Lassiter MD;  Location: [facility] ENDOSCOPY    Lumpectomy left            Other surgical history      ankle sx    Other surgical history      ear surgery    Other surgical history      Mastoidectomy    Radiation left         CURRENT MEDICATIONS:    No outpatient medications have been marked as taking for the 25 encounter (Office Visit) with Claudio Beth MD.       ALLERGIES:  No Known Allergies    SOCIAL HISTORY:    Social History     Socioeconomic History    Marital status:    Tobacco Use    Smoking status: Never     Passive exposure: Never    Smokeless tobacco: Never   Vaping Use    Vaping status: Never Used   Substance and Sexual Activity    Alcohol use: Yes     Alcohol/week: 0.0 standard drinks of alcohol     Comment: 1-3 glasses of wine/week    Drug use: No    Sexual activity: Not Currently     Partners: Male   Other Topics Concern    Caffeine Concern Yes     Comment: Coffee    Reaction to local anesthetic No       FAMILY HISTORY:   Family History   Problem Relation Age of Onset    Breast Cancer Self 66        lt breast    Other (Other) Mother     Cancer Father         Cancer-brain    Cancer Sister         Cancer-colon    Breast Cancer Daughter 55           PHYSICAL EXAM:   Height: 5' 4\" (162.6 cm) ( 1455)  Weight: 206 lb (93.4 kg) ( 1455)  BSA (Calculated - sq m): 1.98 sq meters ( 1455)  Pulse: 102 ( 1455)  BP: 130/62 ( 1532)  Temp: --  Do Not Use - Resp Rate: --  SpO2: --    Body mass index is 35.36 kg/m².       CONSTITUTIONAL:  Awake and alert. Age appropriate, good hygiene not in acute distress. Well-nourished and well developed. no acute distress   PSYCH:   Orientated to time, place, person & situation, Normal mood and affect, memory intact, normal insight and judgment, cooperative       DATA:     Pertinent data reviewed   Latest  Reference Range & Units 02/03/25 11:12   CREATININE 0.55 - 1.02 mg/dL 0.95   CALCIUM 8.7 - 10.4 mg/dL 10.7 (H)   EGFR >=60 mL/min/1.73m2 59 (L)   Albumin 3.2 - 4.8 g/dL 4.5   C-REACTIVE PROTEIN <1.00 mg/dL <0.40   TSH 0.550 - 4.780 uIU/mL 3.550   (H): Data is abnormally high  (L): Data is abnormally low       4/3/24  7:35 AM Resulting Agency    Calcium Urine Calc  100 - 300 mg/24hr [facility] ([facility])   Urine Volume 24Hr  mL 2,050       06/28/24 08:24   XR DEXA BONE DENSITOMETRY (CPT=77080) left femoral neck suggest osteopenia, there may be increased fracture risk.  There has been decrease in the BMD by 1.2% from previous study.  Findings in the total left hip suggest osteopenia, there may be increased fracture risk.    There has been decrease in the BMD by 2.2% from previous study.  The 10 year fracture risk for major osteoporotic fracture is 20%, and for hip fracture is 11%.   2. Findings in the total AP lumbar spine are in the normal range, and there is no increased fracture risk.  There has been decrease in the BMD by 1.5% from previous study.       Latest Reference Range & Units 04/04/24 07:01   CREATININE 0.55 - 1.02 mg/dL 0.84   CALCIUM 8.7 - 10.4 mg/dL 11.2 (H)   BUN/CREATININE RATIO 10.0 - 20.0  20.2 (H)   EGFR >=60 mL/min/1.73m2 69   (H): Data is abnormally high   Latest Reference Range & Units 04/04/24 07:01   PTH INTACT 18.5 - 88.0 pg/mL 139.9 (H)   (H): Data is abnormally high   Latest Reference Range & Units 03/06/24 06:25   VITAMIN D, 25-OH, TOTAL 30.0 - 100.0 ng/mL 28.5 (L)   (L): Data is abnormally low   Latest Reference Range & Units 04/03/24 07:35   CREAT UR CALC 0.60 - 1.80 g/24 hr 0.99   Urine Volume 24Hr mL  mL 2,050  2,050   CALCIUM URINE CALC 100 - 300 mg/24hr 185                       No results for input(s): \"TSH\", \"T4F\", \"T3F\", \"THYP\" in the last 72 hours.  No results found.    Orders Placed This Encounter   Procedures    Comp Metabolic Panel (14)    TSH W Reflex To Free T4     Orders  Placed This Encounter    Comp Metabolic Panel (14)     Standing Status:   Future     Standing Expiration Date:   2/28/2026     Order Specific Question:   Release to patient     Answer:   Immediate    TSH W Reflex To Free T4     Standing Status:   Future     Standing Expiration Date:   2/28/2026     Order Specific Question:   Release to patient     Answer:   Immediate          This is a specialized patient consultation in endocrinology and required comprehensive review of prior records, as well as current evaluation, with time required for consideration of complex endocrine issues and consultation. For this visit, I personally interviewed the patient, and family member if accompanied, performed the pertinent parts of the history and physical examination. ROS included screening for appropriate endocrine conditions.   Today's diagnosis and plan were reviewed in detail with the patient who states understanding and agrees with plan. I discussed with the patient possible diagnosis, differential diagnosis, need for work up, treatment options, alternatives and side effects.     Please see note for details about time spent which includes:   · pre-visit preparation  · reviewing records  · face to face time with the patient   · timely documentation of the encounter  · ordering medications/tests  · communication with care team  · care coordination    I appreciate the opportunity to be part of your patient's medical care and will keep you, as the referring and primary physicians, informed about the care of your patient. Please feel free to contact me should you have any questions.      Claudio Beth MD

## 2025-02-28 NOTE — PATIENT INSTRUCTIONS
Labs before next visit   For thyroid , Labs and US thyroid in Feb 2026    We have dental clearance for Reclast   infusion - will provide # to call to schedule it    Take vitamin D  2000 international unit a day and calcium 600 mg twice a day or 3 servings of dairy a day   Do weight bearing exercise   Follow fall precautions

## 2025-02-28 NOTE — TELEPHONE ENCOUNTER
Patient is requesting to speak with an RN.   She states that she has questions regarding the recommended infusion.  She states that she is confused because the infusion is suppose to be a 1 time infusion but the documentation states something different.  Please call

## 2025-03-01 NOTE — TELEPHONE ENCOUNTER
Patient is requesting a refill for lisinopril-hydroCHLOROthiazide 20-25 MG Oral Tab     Patient says pharmacy told her doctor has not filled the medication     First order to  this pharmacy  [facility] - [place], [place] - [address] [place] [address] [phone], [phone]

## 2025-03-03 DIAGNOSIS — M81.0 AGE-RELATED OSTEOPOROSIS WITHOUT CURRENT PATHOLOGICAL FRACTURE: Primary | ICD-10-CM

## 2025-03-03 RX ORDER — ZOLEDRONIC ACID 0.05 MG/ML
5 INJECTION, SOLUTION INTRAVENOUS ONCE
OUTPATIENT
Start: 2025-03-03

## 2025-03-05 RX ORDER — LISINOPRIL AND HYDROCHLOROTHIAZIDE 20; 25 MG/1; MG/1
1 TABLET ORAL DAILY
Qty: 90 TABLET | Refills: 1 | Status: SHIPPED | OUTPATIENT
Start: 2025-03-05

## 2025-03-05 NOTE — TELEPHONE ENCOUNTER
Refill passed per [facility] protocol.    Requested Prescriptions   Pending Prescriptions Disp Refills    lisinopril-hydroCHLOROthiazide 20-25 MG Oral Tab 90 tablet 3     Sig: Take 1 tablet by mouth daily.       Hypertension Medications Protocol Passed - 3/5/2025  7:45 AM        Passed - CMP or BMP in past 12 months        Passed - Last BP reading less than 140/90     BP Readings from Last 1 Encounters:   02/28/25 130/62               Passed - In person appointment or virtual visit in the past 12 mos or appointment in next 3 mos     Recent Outpatient Visits              5 days ago Age-related osteoporosis without current pathological fracture    [facility] [place] Claudio Beth MD    Office Visit    1 month ago Polyarthralgia    [facility] Lexii Delatorre MD    Office Visit    2 months ago Hyperparathyroidism (HCC)    [facility]    Office Visit    3 months [facility]    [facility]    6 months ago Hyperparathyroidism (HCC)    [facility]    Office Visit          Future Appointments         Provider Department Appt Notes    In 1 week [facility] INFRN 4 Alejandra Baugh [facility] RECLAST-PIV-SL* outpt lab*    In 2 months Lexii Delatorre MD [facility] f/u    In 5 months Claudio Beth MD [facility] 6 month                    Passed - EGFRCR or GFRNAA > 50     GFR Evaluation  EGFRCR: 59 , resulted on 2/3/2025          Passed - Medication is active on med list           Future Appointments         Provider Department Appt Notes    In 1 week [facility] INFRN 4 Alejandra Baugh  [facility] RECLAST-PIV-SL* outpt lab*    In 2 months [facility]    In [facility]          Recent Outpatient Visits              5 days ago Age-related osteoporosis without current pathological fracture    [facility] [place]    Office Visit    1 month ago Polyarthralgia    [facility] Lexii Delatorre MD    Office Visit    2 months ago Hyperparathyroidism (HCC)    [facility] Claudio Sharp MD    Office Visit    3 months ago Thyroid nodule    [facility] Claudio Sharp MD    Office Visit    6 months ago Hyperparathyroidism (HCC)    [facility] Claudio Sharp MD    Office Visit

## 2025-03-05 NOTE — TELEPHONE ENCOUNTER
Spoke with patient confirmed she has infusion for Reclast set up for 03/12/25. She was confused because on the sheet it made it sound like it was a pill. Let her know it was not a pill but infusion.

## 2025-03-06 ENCOUNTER — LAB ENCOUNTER (OUTPATIENT)
Dept: LAB | Facility: HOSPITAL | Age: 85
End: 2025-03-06
Attending: INTERNAL MEDICINE
Payer: MEDICARE

## 2025-03-06 ENCOUNTER — NURSE TRIAGE (OUTPATIENT)
Dept: FAMILY MEDICINE CLINIC | Facility: CLINIC | Age: 85
End: 2025-03-06

## 2025-03-06 DIAGNOSIS — M81.0 AGE-RELATED OSTEOPOROSIS WITHOUT CURRENT PATHOLOGICAL FRACTURE: ICD-10-CM

## 2025-03-06 LAB
ALBUMIN SERPL-MCNC: 4.3 G/DL (ref 3.2–4.8)
CALCIUM BLD-MCNC: 10.5 MG/DL (ref 8.7–10.4)
CREAT BLD-MCNC: 0.87 MG/DL
EGFRCR SERPLBLD CKD-EPI 2021: 66 ML/MIN/1.73M2 (ref 60–?)

## 2025-03-06 PROCEDURE — 82040 ASSAY OF SERUM ALBUMIN: CPT

## 2025-03-06 PROCEDURE — 82310 ASSAY OF CALCIUM: CPT

## 2025-03-06 PROCEDURE — 82565 ASSAY OF CREATININE: CPT

## 2025-03-06 PROCEDURE — 36415 COLL VENOUS BLD VENIPUNCTURE: CPT

## 2025-03-06 NOTE — TELEPHONE ENCOUNTER
Action Requested: Summary for Provider     []  Critical Lab, Recommendations Needed  [] Need Additional Advice  []   FYI    []   Need Orders  [] Need Medications Sent to Pharmacy  []  Other     SUMMARY: states she \"feels off\" like her balance is off, but does not feel dizzy, has not fallen. Booked an appointment for Monday 03/10    Reason for call: Unsteady  Onset: few weeks    The patient called stating that when she is asking she feels like she is swaying a little bit, or like her equilibrium is off. She does not feel 100% steady, she feels like her balance is off. She is not feeling dizzy or light headed when she walks. She has not fallen, she states she just \"feels off\" and would like to be seen. She states she loves to walk and tries to walk a lot but is nervous to due to this feeling. Booked an appointment for Monday 03/10.     Future Appointments   Date Time Provider Department Center   3/10/2025 10:20 AM [facility]   3/12/2025 10:30 AM [facility] INFRN 4 [facility]   6/2/2025  9:20 AM [facility]   6/4/2025  9:00 AM Caron Amezquita MD [facility]   8/4/2025 10:00 AM [facility]       Reason for Disposition   Nursing judgment    Protocols used: No Protocol [id number]

## 2025-03-10 ENCOUNTER — OFFICE VISIT (OUTPATIENT)
Dept: FAMILY MEDICINE CLINIC | Facility: CLINIC | Age: 85
End: 2025-03-10
Payer: MEDICARE

## 2025-03-10 VITALS
HEART RATE: 79 BPM | DIASTOLIC BLOOD PRESSURE: 75 MMHG | WEIGHT: 206 LBS | SYSTOLIC BLOOD PRESSURE: 134 MMHG | HEIGHT: 64 IN | BODY MASS INDEX: 35.17 KG/M2 | OXYGEN SATURATION: 96 %

## 2025-03-10 DIAGNOSIS — H61.21 IMPACTED CERUMEN OF RIGHT EAR: Primary | ICD-10-CM

## 2025-03-10 NOTE — PROGRESS NOTES
Subjective:   Chandrika Strange is a 84 year old female who presents for Follow - Up (Pt states she has noticed that she has been having of balance of equilibrium and she want to get medication for it now.).     Patient feels like she is feeling off balance for the past 1 year. Patient states that this happens 1 once a week when she is walking. Patient denies fever, chills, ear pain, congestion, sore throat, visual changes, sob, chest pain, headaches, light-headedness, dizziness, syncope, abdominal pain, diarrhea, nausea or vomiting.     Patient also c/o fluctuating blood pressure .Patient has apt with cardiologist to discuss. Blood pressure today stable.     History/Other:      Chief Complaint Reviewed and Verified  Nursing Notes Reviewed and   Verified  Tobacco Reviewed  Allergies Reviewed  Medications Reviewed    Problem List Reviewed  Medical History Reviewed  Surgical History   Reviewed  OB Status Reviewed  Family History Reviewed  Social History   Reviewed           Tobacco:  She has never smoked tobacco.      Current Outpatient Medications   Medication Sig Dispense Refill    lisinopril-hydroCHLOROthiazide 20-25 MG Oral Tab Take 1 tablet by mouth daily. 90 tablet 1    Meloxicam 7.5 MG Oral Tab Take 1 tablet (7.5 mg total) by mouth in the morning and 1 tablet (7.5 mg total) before bedtime. (Patient taking differently: Take 1 tablet (7.5 mg total) by mouth as needed.) 60 tablet 0    rosuvastatin 20 MG Oral Tab Take 1 tablet (20 mg total) by mouth nightly. 90 tablet 1    NIACINAMIDE-ZINC-COPPER-FA OR Take by mouth.      Boswellia Hermila Extract Does not apply Powder       Zinc Sulfate (ZINC 15 OR) Take by mouth.      aspirin 81 MG Oral Tab EC Take 1 tablet (81 mg total) by mouth daily. 30 tablet 1    Cyanocobalamin (VITAMIN B-12 OR) Take by mouth.      Boswellia Hermila (BOSWELLIA OR) Take by mouth.      Coenzyme Q10 (COQ10 OR) Take  by mouth.      omega-3 fatty acids (FISH OIL) 1000 MG Oral Cap Take  1,000 mg by mouth daily.         Allergies[1]        Review of Systems   Constitutional:  Positive for fatigue. Negative for activity change.   HENT: Negative.  Negative for congestion, ear pain, rhinorrhea and sneezing.    Eyes: Negative.  Negative for redness.   Respiratory:  Positive for cough. Negative for shortness of breath and wheezing.    Cardiovascular: Negative.  Negative for chest pain.   Gastrointestinal: Negative.  Negative for abdominal pain, constipation, diarrhea, nausea and vomiting.   Endocrine: Negative.    Genitourinary: Negative.  Negative for difficulty urinating and frequency.   Musculoskeletal: Negative.  Negative for back pain, joint swelling and myalgias.   Skin: Negative.  Negative for rash.   Allergic/Immunologic: Negative.    Neurological: Negative.  Negative for dizziness, syncope, light-headedness and headaches.   Hematological: Negative.    Psychiatric/Behavioral: Negative.           Objective:   /75 (BP Location: Right arm, Patient Position: Sitting, Cuff Size: large)   Pulse 79   Ht 5' 4\" (1.626 m)   Wt 206 lb (93.4 kg)   LMP  (LMP Unknown)   SpO2 96%   BMI 35.36 kg/m²  Estimated body mass index is 35.36 kg/m² as calculated from the following:    Height as of this encounter: 5' 4\" (1.626 m).    Weight as of this encounter: 206 lb (93.4 kg).      Physical Exam  Vitals and nursing note reviewed.   Constitutional:       Appearance: Normal appearance. She is normal weight.   HENT:      Head: Normocephalic and atraumatic.      Right Ear: There is impacted cerumen.      Left Ear: Tympanic membrane normal.      Nose: Nose normal.      Mouth/Throat:      Mouth: Mucous membranes are moist.      Pharynx: Oropharynx is clear.   Eyes:      Extraocular Movements: Extraocular movements intact.      Conjunctiva/sclera: Conjunctivae normal.      Pupils: Pupils are equal, round, and reactive to light.   Cardiovascular:      Rate and Rhythm: Normal rate and regular rhythm.      Pulses:  Normal pulses.      Heart sounds: Normal heart sounds.   Pulmonary:      Effort: Pulmonary effort is normal.      Breath sounds: Normal breath sounds.   Abdominal:      General: Abdomen is flat. Bowel sounds are normal.      Palpations: Abdomen is soft.   Musculoskeletal:         General: Normal range of motion.      Cervical back: Normal range of motion and neck supple.   Skin:     General: Skin is warm and dry.   Neurological:      General: No focal deficit present.      Mental Status: She is alert and oriented to person, place, and time. Mental status is at baseline.   Psychiatric:         Mood and Affect: Mood normal.         Behavior: Behavior normal.         Thought Content: Thought content normal.         Judgment: Judgment normal.         Assessment & Plan:     Assessment & Plan  Impacted cerumen of right ear  -Instructed to use Debrox daily per instructions for the next two weeks  -Advised to not use Qtips per AAP guidelines  -Gentle external cleansing with moist washcloth following showers  -Return to clinic in 2 weeks for cleaning           Medication use, effects and side effects discussed in detail with patient. The patient indicated understanding of the diagnosis and agreed with the plan of care.    Return in about 2 weeks (around 3/24/2025) for ear cleaning.    MANISHA Kimble       [1] No Known Allergies

## 2025-03-10 NOTE — PATIENT INSTRUCTIONS
-Instructed to use Debrox daily per instructions for the next two weeks  -Advised to not use Qtips per AAP guidelines  -Gentle external cleansing with moist washcloth following showers  -Return to clinic in 2 weeks for cleaning

## 2025-03-12 ENCOUNTER — OFFICE VISIT (OUTPATIENT)
Age: 85
End: 2025-03-12
Attending: STUDENT IN AN ORGANIZED HEALTH CARE EDUCATION/TRAINING PROGRAM
Payer: MEDICARE

## 2025-03-12 VITALS
BODY MASS INDEX: 35 KG/M2 | SYSTOLIC BLOOD PRESSURE: 158 MMHG | TEMPERATURE: 98 F | DIASTOLIC BLOOD PRESSURE: 84 MMHG | OXYGEN SATURATION: 98 % | HEART RATE: 77 BPM | WEIGHT: 206.19 LBS | RESPIRATION RATE: 18 BRPM

## 2025-03-12 DIAGNOSIS — M81.0 AGE-RELATED OSTEOPOROSIS WITHOUT CURRENT PATHOLOGICAL FRACTURE: Primary | ICD-10-CM

## 2025-03-12 RX ORDER — ZOLEDRONIC ACID 0.05 MG/ML
INJECTION, SOLUTION INTRAVENOUS
Status: COMPLETED
Start: 2025-03-12 | End: 2025-03-12

## 2025-03-12 RX ORDER — ZOLEDRONIC ACID 0.05 MG/ML
5 INJECTION, SOLUTION INTRAVENOUS ONCE
Status: COMPLETED | OUTPATIENT
Start: 2025-03-12 | End: 2025-03-12

## 2025-03-12 RX ORDER — ZOLEDRONIC ACID 0.05 MG/ML
5 INJECTION, SOLUTION INTRAVENOUS ONCE
Status: CANCELLED | OUTPATIENT
Start: 2025-03-12

## 2025-03-12 RX ADMIN — ZOLEDRONIC ACID 5 MG: 0.05 INJECTION, SOLUTION INTRAVENOUS at 11:03:00

## 2025-03-12 NOTE — PROGRESS NOTES
Patient to infusion for Reclast     Arrives ambulatory and without complaints    Review medication profile, possible SE     Denies any upcoming dental work , patient encouraged to increase fluid intake     Reclast given , tolerated well , PIV removed, discharged stable  Will follow up with MD as directed

## 2025-03-13 ENCOUNTER — TELEPHONE (OUTPATIENT)
Dept: ENDOCRINOLOGY CLINIC | Facility: CLINIC | Age: 85
End: 2025-03-13

## 2025-03-13 NOTE — TELEPHONE ENCOUNTER
Patient states that she had infusion done yesterday and is having pain in her shoulders and back.  Is this normal?  Please call.

## 2025-06-04 ENCOUNTER — OFFICE VISIT (OUTPATIENT)
Dept: FAMILY MEDICINE CLINIC | Facility: CLINIC | Age: 85
End: 2025-06-04

## 2025-06-04 ENCOUNTER — TELEPHONE (OUTPATIENT)
Dept: FAMILY MEDICINE CLINIC | Facility: CLINIC | Age: 85
End: 2025-06-04

## 2025-06-04 VITALS
OXYGEN SATURATION: 97 % | DIASTOLIC BLOOD PRESSURE: 79 MMHG | BODY MASS INDEX: 35.58 KG/M2 | WEIGHT: 208.38 LBS | SYSTOLIC BLOOD PRESSURE: 131 MMHG | HEART RATE: 84 BPM | HEIGHT: 64 IN

## 2025-06-04 DIAGNOSIS — E66.01 MORBID (SEVERE) OBESITY DUE TO EXCESS CALORIES (HCC): ICD-10-CM

## 2025-06-04 DIAGNOSIS — E78.2 MIXED HYPERLIPIDEMIA: ICD-10-CM

## 2025-06-04 DIAGNOSIS — Z13.6 SCREENING FOR CARDIOVASCULAR CONDITION: ICD-10-CM

## 2025-06-04 DIAGNOSIS — Z00.00 ENCOUNTER FOR ANNUAL HEALTH EXAMINATION: Primary | ICD-10-CM

## 2025-06-04 DIAGNOSIS — C50.919 MALIGNANT NEOPLASM OF FEMALE BREAST, UNSPECIFIED ESTROGEN RECEPTOR STATUS, UNSPECIFIED LATERALITY, UNSPECIFIED SITE OF BREAST (HCC): ICD-10-CM

## 2025-06-04 DIAGNOSIS — E21.3 HYPERPARATHYROIDISM (HCC): ICD-10-CM

## 2025-06-04 DIAGNOSIS — I25.10 CORONARY ARTERY DISEASE INVOLVING NATIVE CORONARY ARTERY OF NATIVE HEART WITHOUT ANGINA PECTORIS: ICD-10-CM

## 2025-06-04 DIAGNOSIS — I10 PRIMARY HYPERTENSION: ICD-10-CM

## 2025-06-04 DIAGNOSIS — M81.0 AGE-RELATED OSTEOPOROSIS WITHOUT CURRENT PATHOLOGICAL FRACTURE: ICD-10-CM

## 2025-06-04 DIAGNOSIS — Z12.31 ENCOUNTER FOR SCREENING MAMMOGRAM FOR MALIGNANT NEOPLASM OF BREAST: ICD-10-CM

## 2025-06-04 PROCEDURE — G0439 PPPS, SUBSEQ VISIT: HCPCS | Performed by: STUDENT IN AN ORGANIZED HEALTH CARE EDUCATION/TRAINING PROGRAM

## 2025-06-04 PROCEDURE — 99214 OFFICE O/P EST MOD 30 MIN: CPT | Performed by: STUDENT IN AN ORGANIZED HEALTH CARE EDUCATION/TRAINING PROGRAM

## 2025-06-04 PROCEDURE — 99497 ADVNCD CARE PLAN 30 MIN: CPT | Performed by: STUDENT IN AN ORGANIZED HEALTH CARE EDUCATION/TRAINING PROGRAM

## 2025-06-04 RX ORDER — ROSUVASTATIN CALCIUM 20 MG/1
20 TABLET, COATED ORAL NIGHTLY
Qty: 90 TABLET | Refills: 3 | Status: SHIPPED | OUTPATIENT
Start: 2025-06-04 | End: 2025-06-04

## 2025-06-04 RX ORDER — CHOLECALCIFEROL (VITAMIN D3) 50 MCG
2000 TABLET ORAL DAILY
COMMUNITY

## 2025-06-04 RX ORDER — ROSUVASTATIN CALCIUM 20 MG/1
20 TABLET, COATED ORAL NIGHTLY
Qty: 90 TABLET | Refills: 3 | Status: SHIPPED | OUTPATIENT
Start: 2025-06-04

## 2025-06-04 RX ORDER — METHYLDOPA/HYDROCHLOROTHIAZIDE 250MG-25MG
TABLET ORAL
COMMUNITY

## 2025-06-04 RX ORDER — MAGNESIUM HYDROXIDE 400 MG/5ML
SUSPENSION, ORAL (FINAL DOSE FORM) ORAL
COMMUNITY
Start: 2025-04-15

## 2025-06-04 NOTE — TELEPHONE ENCOUNTER
KEY: [id number]    semaglutide-weight management 0.25 MG/0.5ML Subcutaneous Solution Auto-injector, Inject 0.5 mL (0.25 mg total) into the skin once a week for 4 doses., Disp: 2 mL, Rfl: 0

## 2025-06-04 NOTE — PROGRESS NOTES
Subjective:   Chandrika Strange is a 85 year old female who presents for a Medicare Subsequent Annual Wellness visit (Pt already had Initial Annual Wellness) and scheduled follow up of multiple significant but stable problems.            Pt presenting for routine physical and annual medicare wellness check. Denies any acute issues or recent illnesses. Chronic problems as below. Past medical/surgical history, family history, and social history were reviewed. Diet and exercise have been fair. Medicare screening questions per nurse were reviewed. Pt requesting annual testing and med refills.     H/o CAD, HLD  S/p PCI RCA circumflex 2021  Last CT calcium 2020: 452  Cardiology Q6months    H/o osteoporosis  H/o hyperparathyroidism  Notes some body/bony pains after last Reclast dosing    Mood stable, denies SH/SI/HI    Reports being more careful with movement due to concern for fall  Has been doing some home balance exercises    [facility]  [phone]  [phone]    History/Other:   Fall Risk Assessment:   She has been screened for Falls and is High Risk. Fall Prevention information provided to patient in After Visit Summary.    Do you feel unsteady when standing or walking?: No  Do you worry about falling?: Yes  Have you fallen in the past year?: No     Cognitive Assessment:   She had a completely normal cognitive assessment - see flowsheet entries     Functional Ability/Status:   Chandrika Strange has some abnormal functions as listed below:  She has Vision problems based on screening of functional status.       Depression Screening (PHQ):  PHQ-2 SCORE: 0  , done 6/4/2025   Last Fiatt Suicide Screening on 6/4/2025 was No Risk.     <5 minutes spent screening and counseling for depression    Advanced Directives:   She does have a Living Will but we do NOT have it on file in Epic.    She does have a POA but we do NOT have it on file in [employer].    Patient has Advance Care Planning documents but we do not have a copy in EMR.  Discussed Advanced Care Planning with patient and instructed patient to get our office a copy to be scanned into EMR.      Patient Active Problem List   Diagnosis    Family history of colon cancer    History of colon polyps    Hypertension    History of breast cancer    Menopausal state    Medicare annual wellness visit, subsequent    Mixed hyperlipidemia    Body mass index (BMI) of 33.0-33.9 in adult    Asymptomatic microscopic hematuria    Coronary artery calcification    Coronary artery disease involving native coronary artery of native heart without angina pectoris    Claudication    Carotid atherosclerosis, bilateral    Morbid (severe) obesity due to excess calories (HCC)    Chronic pain of left knee    Malignant neoplasm of breast (female) (HCC)    Serrated adenoma of colon    Hyperparathyroidism (HCC)    Hypercalcemia    Low vitamin D level    Subclinical hypothyroidism    Class 2 obesity with body mass index (BMI) of 35.0 to 35.9 in adult    Age-related osteoporosis without current pathological fracture     Allergies:  She has no known allergies.    Current Medications:  Outpatient Medications Marked as Taking for the 6/4/25 encounter (Office Visit) with Caron Amezquita MD   Medication Sig    Selenium 200 MCG Oral Tab selenium 200 mcg tablet, [RxNorm: 0]    cholecalciferol 50 MCG (2000 UT) Oral Tab Take 1 tablet (2,000 Units total) by mouth daily.    Calcium-Magnesium-Vitamin D (CALCIUM MAGNESIUM OR) Take by mouth.    Sodium Hyaluronate, oral, (HYALURONIC ACID) 100 MG Oral Cap Take by mouth.    semaglutide-weight management 0.25 MG/0.5ML Subcutaneous Solution Auto-injector Inject 0.5 mL (0.25 mg total) into the skin once a week for 4 doses.    rosuvastatin 20 MG Oral Tab Take 1 tablet (20 mg total) by mouth nightly.    lisinopril-hydroCHLOROthiazide 20-25 MG Oral Tab Take 1 tablet by mouth daily.    Meloxicam 7.5 MG Oral Tab Take 1 tablet (7.5 mg total) by mouth in the morning and 1 tablet (7.5 mg  total) before bedtime. (Patient taking differently: Take 1 tablet (7.5 mg total) by mouth as needed.)    NIACINAMIDE-ZINC-COPPER-FA OR Take by mouth.    Boswellia Hermila Extract Does not apply Powder     Zinc Sulfate (ZINC 15 OR) Take by mouth.    aspirin 81 MG Oral Tab EC Take 1 tablet (81 mg total) by mouth daily.    Cyanocobalamin (VITAMIN B-12 OR) Take by mouth.    Boswellia Hermila (BOSWELLIA OR) Take by mouth.    Coenzyme Q10 (COQ10 OR) Take  by mouth.    omega-3 fatty acids (FISH OIL) 1000 MG Oral Cap Take 1,000 mg by mouth daily.       Medical History:  She  has a past medical history of Breast cancer (HCC) (), Coronary atherosclerosis, High blood pressure, High cholesterol, History of perforated ear drum, and Unspecified essential hypertension.  Surgical History:  She  has a past surgical history that includes other surgical history; other surgical history; other surgical history; lumpectomy left (); radiation left (); ; colonoscopy (N/A, 3/5/2018); and colonoscopy (N/A, 2022).   Family History:  Her family history includes Breast Cancer (age of onset: 55) in her daughter; Breast Cancer (age of onset: 65) in her self; Cancer in her father and sister; Other in her mother.  Social History:  She  reports that she has never smoked. She has never been exposed to tobacco smoke. She has never used smokeless tobacco. She reports current alcohol use. She reports that she does not use drugs.    Tobacco:  She has never smoked tobacco.    CAGE Alcohol Screen:   CAGE screening score of 0 on 2025, showing low risk of alcohol abuse.      Patient Care Team:  Caron Amezquita MD as PCP - General (Family Medicine)  Radha Lopes PT as Physical Therapist (Physical Therapy)  Brunner, Heather, PT as Physical Therapist (Physical Therapy)    Review of Systems  GENERAL: feels well otherwise  SKIN: denies any unusual skin lesions  EYES: denies blurred vision or double vision  HEENT: denies nasal  congestion, sinus pain or ST  LUNGS: denies shortness of breath with exertion  CARDIOVASCULAR: denies chest pain on exertion  GI: denies abdominal pain, denies heartburn  : denies dysuria, vaginal discharge or itching, no complaint of urinary incontinence   MUSCULOSKELETAL: denies back pain  NEURO: denies headaches  PSYCHE: denies depression or anxiety  HEMATOLOGIC: denies hx of anemia  ENDOCRINE: denies thyroid history  ALL/ASTHMA: denies hx of allergy or asthma    Objective:   Physical Exam  General appearance: alert, appears stated age, and cooperative  Head: Normocephalic, without obvious abnormality, atraumatic  Eyes: negative  Ears: normal TM's and external ear canals both ears  Nose: no discharge  Throat: normal findings: lips normal without lesions and palate normal  Neck: no adenopathy, supple, symmetrical, trachea midline, and thyroid not enlarged, symmetric, no tenderness/mass/nodules  Back: negative  Lungs: clear to auscultation bilaterally  Breasts:  deferred  Heart: S1, S2 normal, regular rate and rhythm  Abdomen: soft, non-tender; bowel sounds normal; no masses,  no organomegaly  Pelvic: deferred  Extremities: extremities normal, atraumatic, no cyanosis or edema  Pulses: 2+ and symmetric  Skin: Skin color, texture, turgor normal. No rashes or lesions  Lymph nodes: negative  Neurologic: Grossly normal    /79   Pulse 84   Ht 5' 4\" (1.626 m)   Wt 208 lb 6.4 oz (94.5 kg)   LMP  (LMP Unknown)   SpO2 97%   BMI 35.77 kg/m²  Estimated body mass index is 35.77 kg/m² as calculated from the following:    Height as of this encounter: 5' 4\" (1.626 m).    Weight as of this encounter: 208 lb 6.4 oz (94.5 kg).    Medicare Hearing Assessment:   Hearing Screening    Screening Method: Questionnaire  I have a problem hearing over the telephone: No I have trouble following the conversations when two or more people are talking at the same time: No   I have trouble understanding things on the TV: No I have to  strain to understand conversations: No   I have to worry about missing the telephone ring or doorbell: No I have trouble hearing conversations in a noisy background such as a crowded room or restaurant: No   I get confused about where sounds come from: No I misunderstand some words in a sentence and need to ask people to repeat themselves: No   I especially have trouble understanding the speech of women and children: No I have trouble understanding the speaker in a large room such as at a meeting or place of Buddhism: No   Many people I talk to seem to mumble (or don't speak clearly): No People get annoyed because I misunderstand what they say: No   I misunderstand what others are saying and make inappropriate responses: No I avoid social activities because I cannot hear well and fear I will reply improperly: No   Family members and friends have told me they think I may have hearing loss: No                   Assessment & Plan:   Chandrika Strange is a 85 year old female who presents for a Medicare Assessment.     1. Encounter for annual health examination (Primary)  Healthy physical exam. Advised to exercise regularly, monitor diet and weight, and follow-up as directed. Will check labs. Continue current medications. Regular follow-up with Cardiology. Annual Medicare physical.  Discussed preventative screenings  - will check labs as below  - encouraged to continue diet/exercise for overall wellness  - follow-up with eye doctor annually  - follow-up with dentist every 6 months  - return yearly for physicals  - annual flu shot  -     Advance Care Planning- 1st 30 minutes  2. Hyperparathyroidism (HCC)  Followed by mckayla Luna  3. Malignant neoplasm of female breast, unspecified estrogen receptor status, unspecified laterality, unspecified site of breast (HCC)  Will plan for continued surveillance  4. Morbid (severe) obesity due to excess calories (HCC)  - discussed healthy diet and lifestyle changes for overall wellness,  which includes decreased carb and sugar intake, increased fiber intake, and increased water intake as tolerated, as well as regular exercise  - counseled on increased protein intake, goal 20-30g/meal  - goal moderate-intensity activity 30min daily / 150min per week  5. Primary hypertension  In-office BP stable, pt otherwise asymptomatic  - discussed benefits of DASH diet and daily activity  - continue BP monitoring  - continue daily medication  - will check labwork  - advised to call if BP is persistently elevated  Overview:  Body mass index is 35.83 kg/m².   Orders:  -     CBC With Differential With Platelet; Future; Expected date: 06/04/2025  -     TSH W Reflex To Free T4; Future; Expected date: 06/04/2025  -     Comp Metabolic Panel (14); Future; Expected date: 06/04/2025  -     Urinalysis, Routine; Future; Expected date: 06/04/2025  6. Mixed hyperlipidemia  Stable, continue statin and ASA  -     Discontinue: Rosuvastatin Calcium; Take 1 tablet (20 mg total) by mouth nightly.  Dispense: 90 tablet; Refill: 3  -     Rosuvastatin Calcium; Take 1 tablet (20 mg total) by mouth nightly.  Dispense: 90 tablet; Refill: 3  7. Age-related osteoporosis without current pathological fracture  Followed by Endo  Reclast annually  8. Coronary artery disease involving native coronary artery of native heart without angina pectoris  Pt with h/o PCI due to CAD  Wegovy is FDA approved for ASCVD risk reduction  Pt would benefit from treatment  -     Semaglutide-Weight Management; Inject 0.5 mL (0.25 mg total) into the skin once a week for 4 doses.  Dispense: 2 mL; Refill: 0  9. Screening for cardiovascular condition  -     Lipid Panel; Future; Expected date: 06/04/2025            The patient indicates understanding of these issues and agrees to the plan.  Reinforced healthy diet, lifestyle, and exercise.      Return for Wellness Visit.     Caron Amezquita MD, 6/4/2025     Supplementary Documentation:   General Health:  In the past  six months, have you lost more than 10 pounds without trying?: 2 - No  Has your appetite been poor?: No  Type of Diet: Balanced  How does the patient maintain a good energy level?: Appropriate Exercise  How would you describe your daily physical activity?: Moderate  How would you describe your current health state?: Good  How do you maintain positive mental well-being?: Social Interaction, Visiting Friends, Visiting Family  On a scale of 0 to 10, with 0 being no pain and 10 being severe pain, what is your pain level?: 0 - (None)  In the past six months, have you experienced urine leakage?: 0-No  At any time do you feel concerned for the safety/well-being of yourself and/or your children, in your home or elsewhere?: No  Have you had any immunizations at another office such as Influenza, Hepatitis B, Tetanus, or Pneumococcal?: No    Health Maintenance   Topic Date Due    Zoster Vaccines (1 of 2) 01/13/2016    COVID-19 Vaccine (4 - 2024-25 season) 09/01/2024    Annual Depression Screening  01/01/2025    Fall Risk Screening (Annual)  01/01/2025    Annual Physical  03/21/2025    Influenza Vaccine (Season Ended) 10/01/2025    DEXA Scan  Completed    Pneumococcal Vaccine: 50+ Years  Completed    Meningococcal B Vaccine  Aged Out    Colorectal Cancer Screening  Discontinued

## 2025-06-04 NOTE — TELEPHONE ENCOUNTER
[facility]y [facility]. It can take up to 14 business days for a decision to come back     Chandrika Strange (Key: [id number])  Wegovy 0.25MG/0.5ML auto-injectors  Form  [employer] Electronic Prior Authorization Request Form (2017 NCPDP)

## 2025-06-06 ENCOUNTER — TELEPHONE (OUTPATIENT)
Dept: FAMILY MEDICINE CLINIC | Facility: CLINIC | Age: 85
End: 2025-06-06

## 2025-06-06 NOTE — TELEPHONE ENCOUNTER
Patient calling (verified full name and date of birth).  Stated she called her insurance regarding her Wegovy prescription and was told it would cost her $800.00 out of pocket  Advised to call insurance as ask  what weight loss mediations are covered at a lower cost to her  Patient will do so and will call back with name of medication

## 2025-06-07 ENCOUNTER — LAB ENCOUNTER (OUTPATIENT)
Dept: LAB | Facility: HOSPITAL | Age: 85
End: 2025-06-07
Attending: STUDENT IN AN ORGANIZED HEALTH CARE EDUCATION/TRAINING PROGRAM
Payer: MEDICARE

## 2025-06-07 DIAGNOSIS — Z13.6 SCREENING FOR CARDIOVASCULAR CONDITION: ICD-10-CM

## 2025-06-07 DIAGNOSIS — I10 PRIMARY HYPERTENSION: ICD-10-CM

## 2025-06-07 LAB
ALBUMIN SERPL-MCNC: 4.4 G/DL (ref 3.2–4.8)
ALBUMIN/GLOB SERPL: 1.9 {RATIO} (ref 1–2)
ALP LIVER SERPL-CCNC: 54 U/L (ref 55–142)
ALT SERPL-CCNC: 17 U/L (ref 10–49)
ANION GAP SERPL CALC-SCNC: 9 MMOL/L (ref 0–18)
AST SERPL-CCNC: 22 U/L (ref ?–34)
BASOPHILS # BLD AUTO: 0.06 X10(3) UL (ref 0–0.2)
BASOPHILS NFR BLD AUTO: 0.8 %
BILIRUB SERPL-MCNC: 0.5 MG/DL (ref 0.2–1.1)
BILIRUB UR QL: NEGATIVE
BUN BLD-MCNC: 17 MG/DL (ref 9–23)
BUN/CREAT SERPL: 18.9 (ref 10–20)
CALCIUM BLD-MCNC: 11.1 MG/DL (ref 8.7–10.4)
CHLORIDE SERPL-SCNC: 106 MMOL/L (ref 98–112)
CHOLEST SERPL-MCNC: 144 MG/DL (ref ?–200)
CLARITY UR: CLEAR
CO2 SERPL-SCNC: 29 MMOL/L (ref 21–32)
COLOR UR: YELLOW
CREAT BLD-MCNC: 0.9 MG/DL (ref 0.55–1.02)
DEPRECATED RDW RBC AUTO: 45.7 FL (ref 35.1–46.3)
EGFRCR SERPLBLD CKD-EPI 2021: 63 ML/MIN/1.73M2 (ref 60–?)
EOSINOPHIL # BLD AUTO: 0.19 X10(3) UL (ref 0–0.7)
EOSINOPHIL NFR BLD AUTO: 2.7 %
ERYTHROCYTE [DISTWIDTH] IN BLOOD BY AUTOMATED COUNT: 13.9 % (ref 11–15)
FASTING PATIENT LIPID ANSWER: YES
FASTING STATUS PATIENT QL REPORTED: YES
GLOBULIN PLAS-MCNC: 2.3 G/DL (ref 2–3.5)
GLUCOSE BLD-MCNC: 83 MG/DL (ref 70–99)
GLUCOSE UR-MCNC: NORMAL MG/DL
HCT VFR BLD AUTO: 41.4 % (ref 35–48)
HDLC SERPL-MCNC: 54 MG/DL (ref 40–59)
HGB BLD-MCNC: 13.3 G/DL (ref 12–16)
HYALINE CASTS #/AREA URNS AUTO: PRESENT /LPF
IMM GRANULOCYTES # BLD AUTO: 0.02 X10(3) UL (ref 0–1)
IMM GRANULOCYTES NFR BLD: 0.3 %
KETONES UR-MCNC: NEGATIVE MG/DL
LDLC SERPL CALC-MCNC: 66 MG/DL (ref ?–100)
LEUKOCYTE ESTERASE UR QL STRIP.AUTO: 250
LYMPHOCYTES # BLD AUTO: 2.55 X10(3) UL (ref 1–4)
LYMPHOCYTES NFR BLD AUTO: 36.1 %
MCH RBC QN AUTO: 28.9 PG (ref 26–34)
MCHC RBC AUTO-ENTMCNC: 32.1 G/DL (ref 31–37)
MCV RBC AUTO: 90 FL (ref 80–100)
MONOCYTES # BLD AUTO: 0.53 X10(3) UL (ref 0.1–1)
MONOCYTES NFR BLD AUTO: 7.5 %
NEUTROPHILS # BLD AUTO: 3.71 X10 (3) UL (ref 1.5–7.7)
NEUTROPHILS # BLD AUTO: 3.71 X10(3) UL (ref 1.5–7.7)
NEUTROPHILS NFR BLD AUTO: 52.6 %
NITRITE UR QL STRIP.AUTO: NEGATIVE
NONHDLC SERPL-MCNC: 90 MG/DL (ref ?–130)
OSMOLALITY SERPL CALC.SUM OF ELEC: 299 MOSM/KG (ref 275–295)
PH UR: 6 [PH] (ref 5–8)
PLATELET # BLD AUTO: 229 10(3)UL (ref 150–450)
POTASSIUM SERPL-SCNC: 3.7 MMOL/L (ref 3.5–5.1)
PROT SERPL-MCNC: 6.7 G/DL (ref 5.7–8.2)
PROT UR-MCNC: NEGATIVE MG/DL
RBC # BLD AUTO: 4.6 X10(6)UL (ref 3.8–5.3)
SODIUM SERPL-SCNC: 144 MMOL/L (ref 136–145)
SP GR UR STRIP: 1.02 (ref 1–1.03)
T4 FREE SERPL-MCNC: 1 NG/DL (ref 0.8–1.7)
TRIGL SERPL-MCNC: 138 MG/DL (ref 30–149)
TSI SER-ACNC: 5.17 UIU/ML (ref 0.55–4.78)
UROBILINOGEN UR STRIP-ACNC: NORMAL
VLDLC SERPL CALC-MCNC: 21 MG/DL (ref 0–30)
WBC # BLD AUTO: 7.1 X10(3) UL (ref 4–11)

## 2025-06-07 PROCEDURE — 85025 COMPLETE CBC W/AUTO DIFF WBC: CPT

## 2025-06-07 PROCEDURE — 84439 ASSAY OF FREE THYROXINE: CPT

## 2025-06-07 PROCEDURE — 36415 COLL VENOUS BLD VENIPUNCTURE: CPT

## 2025-06-07 PROCEDURE — 84443 ASSAY THYROID STIM HORMONE: CPT

## 2025-06-07 PROCEDURE — 80061 LIPID PANEL: CPT

## 2025-06-07 PROCEDURE — 80053 COMPREHEN METABOLIC PANEL: CPT

## 2025-06-07 PROCEDURE — 81001 URINALYSIS AUTO W/SCOPE: CPT

## 2025-06-09 ENCOUNTER — PATIENT MESSAGE (OUTPATIENT)
Dept: FAMILY MEDICINE CLINIC | Facility: CLINIC | Age: 85
End: 2025-06-09

## 2025-06-23 ENCOUNTER — TELEPHONE (OUTPATIENT)
Dept: FAMILY MEDICINE CLINIC | Facility: CLINIC | Age: 85
End: 2025-06-23

## 2025-06-23 NOTE — TELEPHONE ENCOUNTER
Spoke with patient, Date of Birth verified  She stated she is 85 year old, does she still need to do screening mammogram, is it really necessary at her age?   She feel like she should not do it, it's not necessary.   She was advised it is recommended to do a yearly screening but she has the right to refuse if she doesn't want it?   We'll send message to PCP for further advise.   pls advise, thanks in advance.

## 2025-06-27 NOTE — TELEPHONE ENCOUNTER
Routine mammogram screening continues through age 76, pt can defer continued screening if she prefers.  Let her know if something changes or if she has new concerns so we can discuss imaging as needed.

## 2025-07-10 NOTE — PATIENT INSTRUCTIONS
Problem List Items Addressed This Visit        Unprioritized    Hypertension - Primary     Blood pressure 126/82, pulse 76, resp. rate 18, height 5' 4.5\" (1.638 m), weight 215 lb (97.5 kg), not currently breastfeeding.      Patient looks stable, well contr Awake/Alert

## (undated) DEVICE — KIT CLEAN ENDOKIT 1.1OZ GOWNX2

## (undated) DEVICE — SNARE OPTMZ PLPCTM TRP

## (undated) DEVICE — SNARE CAPTIFLEX MICRO-OVL OLY

## (undated) DEVICE — MEDI-VAC NON-CONDUCTIVE SUCTION TUBING 6MM X 1.8M (6FT.) L: Brand: CARDINAL HEALTH

## (undated) DEVICE — ENDOSCOPY PACK - LOWER: Brand: MEDLINE INDUSTRIES, INC.

## (undated) DEVICE — Device: Brand: DEFENDO AIR/WATER/SUCTION AND BIOPSY VALVE

## (undated) DEVICE — KIT ENDO ORCAPOD 160/180/190

## (undated) DEVICE — LINE MNTR ADLT SET O2 INTMD

## (undated) DEVICE — 35 ML SYRINGE REGULAR TIP: Brand: MONOJECT

## (undated) NOTE — MR AVS SNAPSHOT
[facility] [address]  [address] [place] [address] Road  [phone]               Thank you for choosing us for your health care visit with JANIE Dunn.   We are glad to serve you and happy to provide you with this summary of your MULTIVITAMINS Caps   Take  by mouth. omega-3 fatty acids 1000 MG Caps   Take 1 capsule by mouth daily.    Commonly known as:  FISH OIL                   Results of Recent Testing     STREP A ASSAY W/OPTIC      Component Value Standard Range & Uni active are less likely to develop some chronic diseases than adults who are inactive.      HOW TO GET STARTED: HOW TO STAY MOTIVATED:   Start activities slowly and build up over time Do what you like   Get your heart pumping – brisk walking, biking, swimmin

## (undated) NOTE — LETTER
9/9/4899    [address]        [address] [facility] [address]            Dear [address],      Our records indicate that you are due for an appointment for a Colonoscopy in March 2021, or shortly there after, with Cem Jj

## (undated) NOTE — ED AVS SNAPSHOT
Elías Erwin   MRN: [id number]    Department:  [facility] Emergency Department   Date of Visit:  7/13/2019           Disclosure     Insurance plans vary and the physician(s) referred by the ER may not be covered by your plan.  Please contact yo within the next three months to obtain basic health screening including reassessment of your blood pressure.     IF THERE IS ANY CHANGE OR WORSENING OF YOUR CONDITION, CALL YOUR PRIMARY CARE PHYSICIAN AT ONCE OR RETURN IMMEDIATELY TO THE EMERGENCY DEPARTMEN

## (undated) NOTE — LETTER
[address] [facility]    Consent for Coronary CT Angiography    Your doctor has recommended that you have a Coronary CT Angiography procedure.  Coronary CT Angiography is a diagnostic procedure using computed tomography to scan the can range from very minor to very serious leading to a life threatening situation or even death. Please be sure to communicate any allergy you may have to your caregiver immediately.     The information that is obtained during your testing will be treated a

## (undated) NOTE — LETTER
[address], [facility]   Date:   11/14/2022     Name:   Denise Parker    YOB: 1940   MRN:   [id number]       WHERE IS YOUR PAIN NOW? Cassandra the areas on your body where you feel the described sensations. Use the appropriate symbol. [facility] the areas of radiation. Include all affected areas. Just to complete the picture, please draw in the face. ACHE:  ^ ^ ^   NUMBNESS:  0000   PINS & NEEDLES:  = = = =                              ^ ^ ^                       0000              = = = =                                    ^ ^ ^                       0000            = = = =      BURNING:  XXXX   STABBING: ////                  XXXX                ////                         XXXX          ////     Please cassandra the line below indicating your degree of pain right now  with 0 being no pain 10 being the worst pain possible.                                          0             1             2              3             4              5              6              7             8             9             10         Patient Signature:

## (undated) NOTE — LETTER
[address], [place]  Authorization for Invasive Procedures  1. I hereby authorize Dr. Julia Baron , my physician and whomever may be designated as the doctor's assistant, to perform the following operation and/or procedure:  Colonoscopy on Daphnie Barba at [facility].    2. My physician has explained to me the nature and purpose of the operation or other procedure, possible alternative methods of treatment, the risks involved and the possibility of complications to me. I understand the probable consequences of declining the recommended procedure and the alternative methods of treatment. I acknowledge that no guarantee has been made as to the result that may be obtained. 3. I recognize that during the course of this operation or other procedure, unforeseen conditions may necessitate additional or different procedures than those listed above. I, therefore, further authorize and request that the above-named physician, his/her physician assistants, or designees perform such procedures as are, in his/her professional opinion, necessary and desirable. If I have a Do Not Attempt Resuscitation (DNAR) order in place, that status will be suspended while in the operating room, procedural suite, and during the recovery period unless otherwise explicitly stated by me (or a person authorized to consent on my behalf). The surgeon or my attending physician will determine when the applicable recovery period ends for purposes of reinstating the DNAR order. 4. Should the need arise during my operation or immediate post-operative period; I also consent to the administration of blood and/or blood products.  Further, I understand that despite careful testing and screening of blood and blood products, I may still be subject to ill effects as a result of recieving a blood transfusion an/or blood producst. The following are some, but not all, of the potential risks that can occur: fever and allergic reactions, hemolytic reactions, transmission of disease such as hepatitis, AIDS, cytomegalovirus (CMV), and flluid overload. In the event that I wish to have autologous transfusions of my own blood, or a directed donor transfusion, I will discuss this with my physician. 5. I consent to the photographing of the operations or procedures to be performed for the purposes of advancing medicine, science, and/or education, provided my identity is not revealed. If the procedure has been videotaped, the physician/surgeon will obtain the original videotape. The hospital will not be responsible for storage or maintenance of this tape. 6. I consent to the presence of a  or observer as deemed necessary by my physician or his designee. 7. Any tissues or organs removed in the operation or other procedure may be disposed of by and at the discretion of [facility].    8. I understand that the physician and his/her physician assistants may not be employees or agents of [facility], [facility], [facility], but are independent medical practitioners who have been permitted to use its facilities for the care and treatment of their patients. 9. Patients having a sterilization procedure: I understand that if the procedure is successful the results will be permanent and it will therefore be impossible for me to inseminate, conceive or bear children. I also understand that the procedure is intended to result in sterility, although the result has not been guaranteed. 10. I CERTIFY THAT I HAVE READ AND FULLY UNDERSTAND THE ABOVE CONSENT TO OPERATION and/or OTHER PROCEDURE. 11. I acknowledge that my physician has explained sedation/analgesia administration to me including the risks and benefits. I consent to the administration of sedation/analgesia as may be necessary or desirable in the judgment of my physician.      Signature of Patient:  ________________________________________________ Date: _________Time: _________    Responsible person in case of minor or unconscious: _____________________________Relationship: ____________     Witness Signature: ____________________________________________ Date: __________ Time: ___________    Statement of Physician  My signature below affirms that prior to the time of the procedure, I have explained to the patient and/or her legal representative, the risks and benefits involved in the proposed treatment and any reasonable alternative to the proposed treatment. I have also explained the risks and benefits involved in the refusal of the proposed treatment and have answered the patient's questions. If I have a significant financial interest in this procedure/surgery, I have disclosed this and had a discussion with my patient.     Signature of Physician:   ________________________________________Date: _________Time:_______ Patient Name: Jadon Joyce  :    Printed: 2022    Medical Record #: [id number]

## (undated) NOTE — LETTER
[address], [facility]   Date:   9/19/2022     Name:   Vaughn Alanis    YOB: 1940   MRN:   [id number]       WHERE IS YOUR PAIN NOW? Calvin the areas on your body where you feel the described sensations. Use the appropriate symbol. [facility] the areas of radiation. Include all affected areas. Just to complete the picture, please draw in the face. ACHE:  ^ ^ ^   NUMBNESS:  0000   PINS & NEEDLES:  = = = =                              ^ ^ ^                       0000              = = = =                                    ^ ^ ^                       0000            = = = =      BURNING:  XXXX   STABBING: ////                  XXXX                ////                         XXXX          ////     Please calvin the line below indicating your degree of pain right now  with 0 being no pain 10 being the worst pain possible.                                          0             1             2              3             4              5              6              7             8             9             10         Patient Signature: